# Patient Record
Sex: MALE | Race: ASIAN | NOT HISPANIC OR LATINO
[De-identification: names, ages, dates, MRNs, and addresses within clinical notes are randomized per-mention and may not be internally consistent; named-entity substitution may affect disease eponyms.]

---

## 2016-01-25 RX ORDER — SIMETHICONE 80 MG/1
1 TABLET, CHEWABLE ORAL
Qty: 0 | Refills: 0 | DISCHARGE
Start: 2016-01-25

## 2017-01-12 ENCOUNTER — APPOINTMENT (OUTPATIENT)
Dept: GASTROENTEROLOGY | Facility: HOSPITAL | Age: 57
End: 2017-01-12

## 2017-05-01 ENCOUNTER — OUTPATIENT (OUTPATIENT)
Dept: OUTPATIENT SERVICES | Facility: HOSPITAL | Age: 57
LOS: 1 days | End: 2017-05-01
Payer: MEDICAID

## 2017-05-04 DIAGNOSIS — R69 ILLNESS, UNSPECIFIED: ICD-10-CM

## 2017-07-01 PROCEDURE — G9001: CPT

## 2019-02-01 ENCOUNTER — OUTPATIENT (OUTPATIENT)
Dept: OUTPATIENT SERVICES | Facility: HOSPITAL | Age: 59
LOS: 1 days | End: 2019-02-01
Payer: MEDICAID

## 2019-02-01 PROCEDURE — G9001: CPT

## 2019-02-07 ENCOUNTER — EMERGENCY (EMERGENCY)
Facility: HOSPITAL | Age: 59
LOS: 1 days | Discharge: HOME | End: 2019-02-07
Attending: EMERGENCY MEDICINE | Admitting: EMERGENCY MEDICINE
Payer: MEDICAID

## 2019-02-07 VITALS
OXYGEN SATURATION: 96 % | SYSTOLIC BLOOD PRESSURE: 140 MMHG | TEMPERATURE: 98 F | RESPIRATION RATE: 18 BRPM | DIASTOLIC BLOOD PRESSURE: 86 MMHG | HEART RATE: 93 BPM

## 2019-02-07 VITALS
OXYGEN SATURATION: 96 % | RESPIRATION RATE: 17 BRPM | DIASTOLIC BLOOD PRESSURE: 85 MMHG | HEART RATE: 88 BPM | TEMPERATURE: 98 F | SYSTOLIC BLOOD PRESSURE: 125 MMHG

## 2019-02-07 LAB
ALBUMIN SERPL ELPH-MCNC: 4.7 G/DL — SIGNIFICANT CHANGE UP (ref 3.3–5)
ALP SERPL-CCNC: 68 U/L — SIGNIFICANT CHANGE UP (ref 40–120)
ALT FLD-CCNC: 41 U/L — SIGNIFICANT CHANGE UP (ref 4–41)
ANION GAP SERPL CALC-SCNC: 18 MMO/L — HIGH (ref 7–14)
APTT BLD: 34.9 SEC — SIGNIFICANT CHANGE UP (ref 27.5–36.3)
AST SERPL-CCNC: 59 U/L — HIGH (ref 4–40)
BASOPHILS # BLD AUTO: 0.12 K/UL — SIGNIFICANT CHANGE UP (ref 0–0.2)
BASOPHILS NFR BLD AUTO: 1.7 % — SIGNIFICANT CHANGE UP (ref 0–2)
BILIRUB SERPL-MCNC: 0.3 MG/DL — SIGNIFICANT CHANGE UP (ref 0.2–1.2)
BUN SERPL-MCNC: 16 MG/DL — SIGNIFICANT CHANGE UP (ref 7–23)
CALCIUM SERPL-MCNC: 9.1 MG/DL — SIGNIFICANT CHANGE UP (ref 8.4–10.5)
CHLORIDE SERPL-SCNC: 103 MMOL/L — SIGNIFICANT CHANGE UP (ref 98–107)
CO2 SERPL-SCNC: 23 MMOL/L — SIGNIFICANT CHANGE UP (ref 22–31)
CREAT SERPL-MCNC: 0.88 MG/DL — SIGNIFICANT CHANGE UP (ref 0.5–1.3)
EOSINOPHIL # BLD AUTO: 0.37 K/UL — SIGNIFICANT CHANGE UP (ref 0–0.5)
EOSINOPHIL NFR BLD AUTO: 5.3 % — SIGNIFICANT CHANGE UP (ref 0–6)
GLUCOSE SERPL-MCNC: 146 MG/DL — HIGH (ref 70–99)
HCT VFR BLD CALC: 44.6 % — SIGNIFICANT CHANGE UP (ref 39–50)
HGB BLD-MCNC: 14.7 G/DL — SIGNIFICANT CHANGE UP (ref 13–17)
IMM GRANULOCYTES NFR BLD AUTO: 0.3 % — SIGNIFICANT CHANGE UP (ref 0–1.5)
INR BLD: 1.07 — SIGNIFICANT CHANGE UP (ref 0.88–1.17)
LYMPHOCYTES # BLD AUTO: 3.16 K/UL — SIGNIFICANT CHANGE UP (ref 1–3.3)
LYMPHOCYTES # BLD AUTO: 45.7 % — HIGH (ref 13–44)
MCHC RBC-ENTMCNC: 29.2 PG — SIGNIFICANT CHANGE UP (ref 27–34)
MCHC RBC-ENTMCNC: 33 % — SIGNIFICANT CHANGE UP (ref 32–36)
MCV RBC AUTO: 88.5 FL — SIGNIFICANT CHANGE UP (ref 80–100)
MONOCYTES # BLD AUTO: 0.46 K/UL — SIGNIFICANT CHANGE UP (ref 0–0.9)
MONOCYTES NFR BLD AUTO: 6.6 % — SIGNIFICANT CHANGE UP (ref 2–14)
NEUTROPHILS # BLD AUTO: 2.79 K/UL — SIGNIFICANT CHANGE UP (ref 1.8–7.4)
NEUTROPHILS NFR BLD AUTO: 40.4 % — LOW (ref 43–77)
NRBC # FLD: 0 K/UL — LOW (ref 25–125)
PLATELET # BLD AUTO: 282 K/UL — SIGNIFICANT CHANGE UP (ref 150–400)
PMV BLD: 9.5 FL — SIGNIFICANT CHANGE UP (ref 7–13)
POTASSIUM SERPL-MCNC: 4.3 MMOL/L — SIGNIFICANT CHANGE UP (ref 3.5–5.3)
POTASSIUM SERPL-SCNC: 4.3 MMOL/L — SIGNIFICANT CHANGE UP (ref 3.5–5.3)
PROT SERPL-MCNC: 7.9 G/DL — SIGNIFICANT CHANGE UP (ref 6–8.3)
PROTHROM AB SERPL-ACNC: 12.2 SEC — SIGNIFICANT CHANGE UP (ref 9.8–13.1)
RBC # BLD: 5.04 M/UL — SIGNIFICANT CHANGE UP (ref 4.2–5.8)
RBC # FLD: 12.7 % — SIGNIFICANT CHANGE UP (ref 10.3–14.5)
SODIUM SERPL-SCNC: 144 MMOL/L — SIGNIFICANT CHANGE UP (ref 135–145)
WBC # BLD: 6.92 K/UL — SIGNIFICANT CHANGE UP (ref 3.8–10.5)
WBC # FLD AUTO: 6.92 K/UL — SIGNIFICANT CHANGE UP (ref 3.8–10.5)

## 2019-02-07 PROCEDURE — 70450 CT HEAD/BRAIN W/O DYE: CPT | Mod: 26

## 2019-02-07 PROCEDURE — 99283 EMERGENCY DEPT VISIT LOW MDM: CPT | Mod: 25

## 2019-02-07 PROCEDURE — 72125 CT NECK SPINE W/O DYE: CPT | Mod: 26

## 2019-02-07 RX ORDER — SODIUM CHLORIDE 9 MG/ML
2000 INJECTION INTRAMUSCULAR; INTRAVENOUS; SUBCUTANEOUS ONCE
Refills: 0 | Status: COMPLETED | OUTPATIENT
Start: 2019-02-07 | End: 2019-02-07

## 2019-02-07 RX ADMIN — SODIUM CHLORIDE 2000 MILLILITER(S): 9 INJECTION INTRAMUSCULAR; INTRAVENOUS; SUBCUTANEOUS at 08:58

## 2019-02-07 NOTE — ED PROVIDER NOTE - PHYSICAL EXAMINATION
***GEN - appears intoxicated, NAD   ***HEAD - 2cm superficial laceration to occiput with no active or pulsatile bleeding.    ***EYES/NOSE - PEERL, EOMI, mucous membranes moist, no discharge   ***THROAT: Oral cavity and pharynx normal. No inflammation, swelling, exudate, or lesions.    ***NECK: supple, non-tender no lymphadenopathy  ***PULMONARY - CTA b/l, symmetric breath sounds.   ***CARDIAC- s1s2, RRR, no murmur  ***ABDOMEN - +BS, ND, NT, soft, no guarding, no rebound, no organomegaly  ***BACK - no CVA tenderness, Normal  spine, no spinal TTP  ***EXTREMITIES - symmetric pulses, 2+ dp, capillary refill < 2 seconds, no clubbing, no cyanosis, no edema   ***SKIN - as above, otherwise warm, dry, intact, no rash or bruising   ***NEUROLOGIC - alert, oriented x2 CN 3-12 intact, sensation nl, motor 5/5 RUE/LUE/RLE/LLE

## 2019-02-07 NOTE — ED PROVIDER NOTE - NSFOLLOWUPINSTRUCTIONS_ED_ALL_ED_FT
1) You were here for alcohol intoxication and a fall.    2) Take tylenol for pain.    3) Follow up with your primary doctor for further evaluation and to answer any questions you have.    4) Return to the emergency department if you experience worsening symptoms, pain, fever, chills, nausea, vomiting or other concerning symptoms.

## 2019-02-07 NOTE — ED ADULT TRIAGE NOTE - CHIEF COMPLAINT QUOTE
BIBEMS s/p fall at home tripping over the lip between two rooms. Patient has had "a lot to drink," (+) AOB, with laceration to posterior skull. Patient A/Ox3, unsure of date but knows the day of the week. Head is wrapped, bleeding controlled, denies anticoagulation or LOC. Abdomen distended. Patient needs redirecting but able to follow commands. Denies pain, vision changes.

## 2019-02-07 NOTE — ED PROVIDER NOTE - MEDICAL DECISION MAKING DETAILS
58M with h/o alcohol abuse presents intoxicated with scalp wound after reported fall.  No other signs of trauma.  Wound is superficial, requires no primary closure.  Will clean and cover.  Will obtain hct and c-spine ct, place in c-collar for now.  Will obtain labs to r/o coagulopathy or other abnl. Will give ivf. 58M with h/o alcohol abuse presents intoxicated with scalp wound after reported fall.  No other signs of trauma.  Wound is superficial, requires no primary closure.  Will clean and cover.  Will obtain hct and c-spine ct, place in c-collar for now.  Will obtain labs to r/o coagulopathy or other abnl. Will give ivf.  Ephraim: Admits to drinking, slurred speech, bleeding from head wound. Concern for brain trauma, Full physical and assessment of glucose essential. will look at labs for bleeding risk.  CT head and neck

## 2019-02-07 NOTE — ED ADULT NURSE REASSESSMENT NOTE - NS ED NURSE REASSESS COMMENT FT1
Report received from Kevin TRINH. Pt is A&Ox3. Pt is visibly intoxicated, poor historian. States "I had a lot of vodka," unable to determine exact amount. Breath sounds even and unlabored, lung sounds clear, heart sounds normal, pulses strong and bounding. 20 G IV inserted in Left AC. Pt currently at CT. Report received from Kevin TRINH. Pt is A&Ox3. Pt is visibly intoxicated, poor historian. States "I had a lot of vodka," unable to determine exact amount. Respirations even and unlabored, lung sounds clear, heart sounds normal, 20 G IV inserted in Left AC. Pt currently at CT.

## 2019-02-07 NOTE — ED ADULT NURSE NOTE - NSIMPLEMENTINTERV_GEN_ALL_ED
Implemented All Fall with Harm Risk Interventions:  [place] to call system. Call bell, personal items and telephone within reach. Instruct patient to call for assistance. Room bathroom lighting operational. Non-slip footwear when patient is off stretcher. Physically safe environment: no spills, clutter or unnecessary equipment. Stretcher in lowest position, wheels locked, appropriate side rails in place. Provide visual cue, wrist band, yellow gown, etc. Monitor gait and stability. Monitor for mental status changes and reorient to person, place, and time. Review medications for side effects contributing to fall risk. Reinforce activity limits and safety measures with patient and family. Provide visual clues: red socks.

## 2019-02-07 NOTE — ED PROVIDER NOTE - OBJECTIVE STATEMENT
57 yo M PMH alcohol abuse, Hypertension presents with intoxication and superficial laceration to occiput.  Patient states he fell but is unable to provide other details.  Denies other complaints of pain.  States he has been drinking alcohol but unable to detail amount. 57 yo M PMH alcohol abuse, Hypertension presents with intoxication and superficial laceration to occiput.  Patient states he fell but is unable to provide other details.  Denies other complaints of pain.  States he has been drinking alcohol but unable to detail amount.  Last tetanus update was 2 yrs ago.

## 2019-02-07 NOTE — ED PROVIDER NOTE - ATTENDING CONTRIBUTION TO CARE
I performed a history and physical exam of the patient and discussed their management with the resident and /or advanced care provider. I reviewed the resident and /or ACP's note and agree with the documented findings and plan of care. My medical decison making and observations are found above.  Lungs, head laceration 2cm,

## 2019-02-08 DIAGNOSIS — Z71.89 OTHER SPECIFIED COUNSELING: ICD-10-CM

## 2019-03-23 ENCOUNTER — EMERGENCY (EMERGENCY)
Facility: HOSPITAL | Age: 59
LOS: 1 days | Discharge: HOME | End: 2019-03-23
Attending: EMERGENCY MEDICINE | Admitting: EMERGENCY MEDICINE
Payer: MEDICAID

## 2019-03-23 VITALS
DIASTOLIC BLOOD PRESSURE: 96 MMHG | HEART RATE: 100 BPM | OXYGEN SATURATION: 100 % | RESPIRATION RATE: 16 BRPM | TEMPERATURE: 98 F | SYSTOLIC BLOOD PRESSURE: 142 MMHG

## 2019-03-23 VITALS
HEART RATE: 88 BPM | OXYGEN SATURATION: 98 % | RESPIRATION RATE: 18 BRPM | DIASTOLIC BLOOD PRESSURE: 91 MMHG | TEMPERATURE: 98 F | SYSTOLIC BLOOD PRESSURE: 129 MMHG

## 2019-03-23 PROCEDURE — 99282 EMERGENCY DEPT VISIT SF MDM: CPT
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
## 2019-03-23 NOTE — ED PROVIDER NOTE - PROGRESS NOTE DETAILS
Pt given outpatient detox and etoh abuse and misuse information and resources. Pt requesting d/c at this time, significant other here to pick him up. No SI or HI, no apparent threat to self or others, ready for d/c with strict return precautions. Able to ambulate without assistance, tolerating PO. Pt instructed to never drive while under influence of alcohol. Has ride home.

## 2019-03-23 NOTE — ED PROVIDER NOTE - CONSTITUTIONAL, MLM
normal... Appears intoxicated clinically with smell of etoh on breath; well nourished, awake, alert, oriented to person, place, time/situation and in no apparent distress.

## 2019-03-23 NOTE — ED PROVIDER NOTE - NSFOLLOWUPINSTRUCTIONS_ED_ALL_ED_FT
Stay hydrated by drinking plenty of water. You were given resources while in the ER to help you quit alcohol. Never drive while under the influence of alcohol. Follow up with your primary doctor within 24 hours. Return to the ER promptly for any urgent concerns.

## 2019-03-23 NOTE — ED PROVIDER NOTE - ATTENDING CONTRIBUTION TO CARE
Mj: I have seen and examined the patient face to face, have reviewed and addended the HPI, PE and a/p as necessary.     57 yo M a/w ETOH intoxication.  Patient was BIBA reports drinking daily.  Denies any head trauma, nausea, vomtiing, dizziness, chest pain, shortness of breath, abdominal pain.   Denies any active or previous suicidal ideation or attempt, denies hallucinations or HI.   Reports feeling depressed over the past few months. Denies recent falls, head trauma.     GEN - NAD; well appearing; A+O x3; non-toxic appearing CARD -s1s2, RRR, no M,G,R; PULM - CTA b/l, symmetric breath sounds; ABD:  +BS, ND, NT, soft, no guarding, no rebound, no masses; BACK: no CVA tenderness, Normal  spine; EXT: symmetric pulses, 2+ dp, capillary refill < 2 seconds, no clubbing, no cyanosis, no edema NEURO: alert, CN 2-12 intact, reflexes nl, sensation nl, coordination nl, finger to nose nl, romberg negative, motor 5/5 RUE/LUE/RLE/LLE/EHL/Plantar flexion, no pronator drift, gait nl     57 yo M a/w etoh intxo, SBIRT eval, will provide crisis center follow up for depressive symptoms.  No signs of trauma on history or physical exam.  Awaiting patient to become sober.

## 2019-03-23 NOTE — ED PROVIDER NOTE - CLINICAL SUMMARY MEDICAL DECISION MAKING FREE TEXT BOX
Pt with PMH of etoh abuse presents for intoxication, apppears clinically intoxicated in the ED. A&O x3, normal neuro exam, no signs of trauma. Plan: observation, PO challenge, will d/c when clinically sober or allow family to .

## 2019-03-23 NOTE — ED PROVIDER NOTE - PSYCHIATRIC, MLM
Appears clinically intoxicated but alert and oriented to person, place, time/situation. normal mood and affect. no apparent risk to self or others. Cooperative with exam, no agitation.

## 2019-03-23 NOTE — ED ADULT NURSE NOTE - NSIMPLEMENTINTERV_GEN_ALL_ED
Implemented All Fall Risk Interventions:  [place] to call system. Call bell, personal items and telephone within reach. Instruct patient to call for assistance. Room bathroom lighting operational. Non-slip footwear when patient is off stretcher. Physically safe environment: no spills, clutter or unnecessary equipment. Stretcher in lowest position, wheels locked, appropriate side rails in place. Provide visual cue, wrist band, yellow gown, etc. Monitor gait and stability. Monitor for mental status changes and reorient to person, place, and time. Review medications for side effects contributing to fall risk. Reinforce activity limits and safety measures with patient and family.

## 2019-09-01 ENCOUNTER — OUTPATIENT (OUTPATIENT)
Dept: OUTPATIENT SERVICES | Facility: HOSPITAL | Age: 59
LOS: 1 days | End: 2019-09-01
Payer: MEDICAID

## 2019-09-01 PROCEDURE — G9001: CPT

## 2019-09-06 ENCOUNTER — INPATIENT (INPATIENT)
Facility: HOSPITAL | Age: 59
LOS: 2 days | Discharge: HOME | End: 2019-09-09
Attending: HOSPITALIST | Admitting: HOSPITALIST
Payer: MEDICAID

## 2019-09-06 VITALS
DIASTOLIC BLOOD PRESSURE: 84 MMHG | SYSTOLIC BLOOD PRESSURE: 148 MMHG | OXYGEN SATURATION: 100 % | RESPIRATION RATE: 17 BRPM | HEART RATE: 93 BPM | TEMPERATURE: 98 F

## 2019-09-06 DIAGNOSIS — I10 ESSENTIAL (PRIMARY) HYPERTENSION: ICD-10-CM

## 2019-09-06 DIAGNOSIS — J45.909 UNSPECIFIED ASTHMA, UNCOMPLICATED: ICD-10-CM

## 2019-09-06 DIAGNOSIS — R74.0 NONSPECIFIC ELEVATION OF LEVELS OF TRANSAMINASE AND LACTIC ACID DEHYDROGENASE [LDH]: ICD-10-CM

## 2019-09-06 DIAGNOSIS — F10.10 ALCOHOL ABUSE, UNCOMPLICATED: ICD-10-CM

## 2019-09-06 DIAGNOSIS — K70.10 ALCOHOLIC HEPATITIS WITHOUT ASCITES: ICD-10-CM

## 2019-09-06 DIAGNOSIS — Z29.9 ENCOUNTER FOR PROPHYLACTIC MEASURES, UNSPECIFIED: ICD-10-CM

## 2019-09-06 LAB
ALBUMIN SERPL ELPH-MCNC: 3.9 G/DL — SIGNIFICANT CHANGE UP (ref 3.3–5)
ALBUMIN SERPL ELPH-MCNC: 4.2 G/DL — SIGNIFICANT CHANGE UP (ref 3.3–5)
ALBUMIN SERPL ELPH-MCNC: 4.7 G/DL — SIGNIFICANT CHANGE UP (ref 3.3–5)
ALP SERPL-CCNC: 63 U/L — SIGNIFICANT CHANGE UP (ref 40–120)
ALP SERPL-CCNC: 65 U/L — SIGNIFICANT CHANGE UP (ref 40–120)
ALP SERPL-CCNC: 69 U/L — SIGNIFICANT CHANGE UP (ref 40–120)
ALT FLD-CCNC: 1058 U/L — HIGH (ref 4–41)
ALT FLD-CCNC: 715 U/L — HIGH (ref 4–41)
ALT FLD-CCNC: SIGNIFICANT CHANGE UP U/L (ref 4–41)
AMMONIA BLD-MCNC: 45 UMOL/L — SIGNIFICANT CHANGE UP (ref 11–55)
ANION GAP SERPL CALC-SCNC: 12 MMO/L — SIGNIFICANT CHANGE UP (ref 7–14)
ANION GAP SERPL CALC-SCNC: 15 MMO/L — HIGH (ref 7–14)
ANION GAP SERPL CALC-SCNC: 17 MMO/L — HIGH (ref 7–14)
APAP SERPL-MCNC: < 15 UG/ML — LOW (ref 15–25)
APTT BLD: 30.3 SEC — SIGNIFICANT CHANGE UP (ref 27.5–36.3)
APTT BLD: 30.8 SEC — SIGNIFICANT CHANGE UP (ref 27.5–36.3)
AST SERPL-CCNC: 1042 U/L — HIGH (ref 4–40)
AST SERPL-CCNC: 2400 U/L — HIGH (ref 4–40)
AST SERPL-CCNC: SIGNIFICANT CHANGE UP U/L (ref 4–40)
BASE EXCESS BLDV CALC-SCNC: 3.4 MMOL/L — SIGNIFICANT CHANGE UP
BASOPHILS # BLD AUTO: 0.1 K/UL — SIGNIFICANT CHANGE UP (ref 0–0.2)
BASOPHILS NFR BLD AUTO: 1.6 % — SIGNIFICANT CHANGE UP (ref 0–2)
BILIRUB SERPL-MCNC: 1 MG/DL — SIGNIFICANT CHANGE UP (ref 0.2–1.2)
BILIRUB SERPL-MCNC: 1.2 MG/DL — SIGNIFICANT CHANGE UP (ref 0.2–1.2)
BILIRUB SERPL-MCNC: 1.7 MG/DL — HIGH (ref 0.2–1.2)
BLOOD GAS VENOUS - CREATININE: 0.98 MG/DL — SIGNIFICANT CHANGE UP (ref 0.5–1.3)
BLOOD GAS VENOUS - FIO2: 21 — SIGNIFICANT CHANGE UP
BUN SERPL-MCNC: 10 MG/DL — SIGNIFICANT CHANGE UP (ref 7–23)
BUN SERPL-MCNC: 15 MG/DL — SIGNIFICANT CHANGE UP (ref 7–23)
BUN SERPL-MCNC: 9 MG/DL — SIGNIFICANT CHANGE UP (ref 7–23)
CALCIUM SERPL-MCNC: 8.5 MG/DL — SIGNIFICANT CHANGE UP (ref 8.4–10.5)
CALCIUM SERPL-MCNC: 8.7 MG/DL — SIGNIFICANT CHANGE UP (ref 8.4–10.5)
CALCIUM SERPL-MCNC: 9.2 MG/DL — SIGNIFICANT CHANGE UP (ref 8.4–10.5)
CHLORIDE BLDV-SCNC: 104 MMOL/L — SIGNIFICANT CHANGE UP (ref 96–108)
CHLORIDE SERPL-SCNC: 103 MMOL/L — SIGNIFICANT CHANGE UP (ref 98–107)
CHLORIDE SERPL-SCNC: 105 MMOL/L — SIGNIFICANT CHANGE UP (ref 98–107)
CHLORIDE SERPL-SCNC: 97 MMOL/L — LOW (ref 98–107)
CHOLEST SERPL-MCNC: 138 MG/DL — SIGNIFICANT CHANGE UP (ref 120–199)
CK SERPL-CCNC: 438 U/L — HIGH (ref 30–200)
CO2 SERPL-SCNC: 21 MMOL/L — LOW (ref 22–31)
CO2 SERPL-SCNC: 21 MMOL/L — LOW (ref 22–31)
CO2 SERPL-SCNC: 24 MMOL/L — SIGNIFICANT CHANGE UP (ref 22–31)
CREAT SERPL-MCNC: 0.84 MG/DL — SIGNIFICANT CHANGE UP (ref 0.5–1.3)
CREAT SERPL-MCNC: 0.92 MG/DL — SIGNIFICANT CHANGE UP (ref 0.5–1.3)
CREAT SERPL-MCNC: 0.93 MG/DL — SIGNIFICANT CHANGE UP (ref 0.5–1.3)
EOSINOPHIL # BLD AUTO: 0.33 K/UL — SIGNIFICANT CHANGE UP (ref 0–0.5)
EOSINOPHIL NFR BLD AUTO: 5.4 % — SIGNIFICANT CHANGE UP (ref 0–6)
ETHANOL BLD-MCNC: < 10 MG/DL — SIGNIFICANT CHANGE UP
GAS PNL BLDV: 136 MMOL/L — SIGNIFICANT CHANGE UP (ref 136–146)
GLUCOSE BLDV-MCNC: 118 MG/DL — HIGH (ref 70–99)
GLUCOSE SERPL-MCNC: 126 MG/DL — HIGH (ref 70–99)
GLUCOSE SERPL-MCNC: 134 MG/DL — HIGH (ref 70–99)
GLUCOSE SERPL-MCNC: 155 MG/DL — HIGH (ref 70–99)
HAV IGM SER-ACNC: NONREACTIVE — SIGNIFICANT CHANGE UP
HBA1C BLD-MCNC: 6.1 % — HIGH (ref 4–5.6)
HBV CORE IGM SER-ACNC: NONREACTIVE — SIGNIFICANT CHANGE UP
HBV SURFACE AG SER-ACNC: NONREACTIVE — SIGNIFICANT CHANGE UP
HCO3 BLDV-SCNC: 25 MMOL/L — SIGNIFICANT CHANGE UP (ref 20–27)
HCT VFR BLD CALC: 45.8 % — SIGNIFICANT CHANGE UP (ref 39–50)
HCT VFR BLDV CALC: 49.2 % — SIGNIFICANT CHANGE UP (ref 39–51)
HCV AB S/CO SERPL IA: 0.1 S/CO — SIGNIFICANT CHANGE UP (ref 0–0.99)
HCV AB SERPL-IMP: SIGNIFICANT CHANGE UP
HDLC SERPL-MCNC: 29 MG/DL — LOW (ref 35–55)
HGB BLD-MCNC: 15.6 G/DL — SIGNIFICANT CHANGE UP (ref 13–17)
HGB BLDV-MCNC: 16.1 G/DL — SIGNIFICANT CHANGE UP (ref 13–17)
IMM GRANULOCYTES NFR BLD AUTO: 0.2 % — SIGNIFICANT CHANGE UP (ref 0–1.5)
INR BLD: 1.12 — SIGNIFICANT CHANGE UP (ref 0.88–1.17)
INR BLD: 1.18 — HIGH (ref 0.88–1.17)
LACTATE BLDV-MCNC: 2.6 MMOL/L — HIGH (ref 0.5–2)
LIDOCAIN IGE QN: 15.6 U/L — SIGNIFICANT CHANGE UP (ref 7–60)
LIPID PNL WITH DIRECT LDL SERPL: 15 MG/DL — SIGNIFICANT CHANGE UP
LYMPHOCYTES # BLD AUTO: 1.52 K/UL — SIGNIFICANT CHANGE UP (ref 1–3.3)
LYMPHOCYTES # BLD AUTO: 25 % — SIGNIFICANT CHANGE UP (ref 13–44)
MAGNESIUM SERPL-MCNC: 1.6 MG/DL — SIGNIFICANT CHANGE UP (ref 1.6–2.6)
MCHC RBC-ENTMCNC: 30.3 PG — SIGNIFICANT CHANGE UP (ref 27–34)
MCHC RBC-ENTMCNC: 34.1 % — SIGNIFICANT CHANGE UP (ref 32–36)
MCV RBC AUTO: 88.9 FL — SIGNIFICANT CHANGE UP (ref 80–100)
MONOCYTES # BLD AUTO: 0.38 K/UL — SIGNIFICANT CHANGE UP (ref 0–0.9)
MONOCYTES NFR BLD AUTO: 6.3 % — SIGNIFICANT CHANGE UP (ref 2–14)
NEUTROPHILS # BLD AUTO: 3.74 K/UL — SIGNIFICANT CHANGE UP (ref 1.8–7.4)
NEUTROPHILS NFR BLD AUTO: 61.5 % — SIGNIFICANT CHANGE UP (ref 43–77)
NRBC # FLD: 0 K/UL — SIGNIFICANT CHANGE UP (ref 0–0)
PCO2 BLDV: 43 MMHG — SIGNIFICANT CHANGE UP (ref 41–51)
PH BLDV: 7.42 PH — SIGNIFICANT CHANGE UP (ref 7.32–7.43)
PHOSPHATE SERPL-MCNC: 2 MG/DL — LOW (ref 2.5–4.5)
PLATELET # BLD AUTO: 199 K/UL — SIGNIFICANT CHANGE UP (ref 150–400)
PMV BLD: 9.5 FL — SIGNIFICANT CHANGE UP (ref 7–13)
PO2 BLDV: 24 MMHG — LOW (ref 35–40)
POTASSIUM BLDV-SCNC: 4.4 MMOL/L — SIGNIFICANT CHANGE UP (ref 3.4–4.5)
POTASSIUM SERPL-MCNC: 3.6 MMOL/L — SIGNIFICANT CHANGE UP (ref 3.5–5.3)
POTASSIUM SERPL-MCNC: 3.8 MMOL/L — SIGNIFICANT CHANGE UP (ref 3.5–5.3)
POTASSIUM SERPL-MCNC: 4.5 MMOL/L — SIGNIFICANT CHANGE UP (ref 3.5–5.3)
POTASSIUM SERPL-SCNC: 3.6 MMOL/L — SIGNIFICANT CHANGE UP (ref 3.5–5.3)
POTASSIUM SERPL-SCNC: 3.8 MMOL/L — SIGNIFICANT CHANGE UP (ref 3.5–5.3)
POTASSIUM SERPL-SCNC: 4.5 MMOL/L — SIGNIFICANT CHANGE UP (ref 3.5–5.3)
PROT SERPL-MCNC: 6.8 G/DL — SIGNIFICANT CHANGE UP (ref 6–8.3)
PROT SERPL-MCNC: 7.2 G/DL — SIGNIFICANT CHANGE UP (ref 6–8.3)
PROT SERPL-MCNC: 7.9 G/DL — SIGNIFICANT CHANGE UP (ref 6–8.3)
PROTHROM AB SERPL-ACNC: 12.5 SEC — SIGNIFICANT CHANGE UP (ref 9.8–13.1)
PROTHROM AB SERPL-ACNC: 13.5 SEC — HIGH (ref 9.8–13.1)
RBC # BLD: 5.15 M/UL — SIGNIFICANT CHANGE UP (ref 4.2–5.8)
RBC # FLD: 13 % — SIGNIFICANT CHANGE UP (ref 10.3–14.5)
SALICYLATES SERPL-MCNC: < 5 MG/DL — LOW (ref 15–30)
SAO2 % BLDV: 33.2 % — LOW (ref 60–85)
SODIUM SERPL-SCNC: 138 MMOL/L — SIGNIFICANT CHANGE UP (ref 135–145)
SODIUM SERPL-SCNC: 138 MMOL/L — SIGNIFICANT CHANGE UP (ref 135–145)
SODIUM SERPL-SCNC: 139 MMOL/L — SIGNIFICANT CHANGE UP (ref 135–145)
TRIGL SERPL-MCNC: 638 MG/DL — HIGH (ref 10–149)
TSH SERPL-MCNC: 4.33 UIU/ML — HIGH (ref 0.27–4.2)
WBC # BLD: 6.08 K/UL — SIGNIFICANT CHANGE UP (ref 3.8–10.5)
WBC # FLD AUTO: 6.08 K/UL — SIGNIFICANT CHANGE UP (ref 3.8–10.5)

## 2019-09-06 PROCEDURE — 76705 ECHO EXAM OF ABDOMEN: CPT | Mod: 26

## 2019-09-06 PROCEDURE — 99222 1ST HOSP IP/OBS MODERATE 55: CPT | Mod: GC

## 2019-09-06 PROCEDURE — 99223 1ST HOSP IP/OBS HIGH 75: CPT | Mod: GC

## 2019-09-06 RX ORDER — THIAMINE MONONITRATE (VIT B1) 100 MG
500 TABLET ORAL EVERY 8 HOURS
Refills: 0 | Status: DISCONTINUED | OUTPATIENT
Start: 2019-09-06 | End: 2019-09-06

## 2019-09-06 RX ORDER — SODIUM CHLORIDE 9 MG/ML
1000 INJECTION INTRAMUSCULAR; INTRAVENOUS; SUBCUTANEOUS ONCE
Refills: 0 | Status: COMPLETED | OUTPATIENT
Start: 2019-09-06 | End: 2019-09-06

## 2019-09-06 RX ORDER — ONDANSETRON 8 MG/1
4 TABLET, FILM COATED ORAL ONCE
Refills: 0 | Status: COMPLETED | OUTPATIENT
Start: 2019-09-06 | End: 2019-09-06

## 2019-09-06 RX ORDER — THIAMINE MONONITRATE (VIT B1) 100 MG
500 TABLET ORAL EVERY 8 HOURS
Refills: 0 | Status: COMPLETED | OUTPATIENT
Start: 2019-09-06 | End: 2019-09-09

## 2019-09-06 RX ORDER — INFLUENZA VIRUS VACCINE 15; 15; 15; 15 UG/.5ML; UG/.5ML; UG/.5ML; UG/.5ML
0.5 SUSPENSION INTRAMUSCULAR ONCE
Refills: 0 | Status: DISCONTINUED | OUTPATIENT
Start: 2019-09-06 | End: 2019-09-09

## 2019-09-06 RX ORDER — IPRATROPIUM/ALBUTEROL SULFATE 18-103MCG
3 AEROSOL WITH ADAPTER (GRAM) INHALATION ONCE
Refills: 0 | Status: COMPLETED | OUTPATIENT
Start: 2019-09-06 | End: 2019-09-06

## 2019-09-06 RX ORDER — FAMOTIDINE 10 MG/ML
20 INJECTION INTRAVENOUS ONCE
Refills: 0 | Status: COMPLETED | OUTPATIENT
Start: 2019-09-06 | End: 2019-09-06

## 2019-09-06 RX ORDER — ONDANSETRON 8 MG/1
4 TABLET, FILM COATED ORAL ONCE
Refills: 0 | Status: DISCONTINUED | OUTPATIENT
Start: 2019-09-06 | End: 2019-09-06

## 2019-09-06 RX ORDER — SODIUM CHLORIDE 9 MG/ML
1000 INJECTION, SOLUTION INTRAVENOUS
Refills: 0 | Status: DISCONTINUED | OUTPATIENT
Start: 2019-09-06 | End: 2019-09-09

## 2019-09-06 RX ORDER — SODIUM,POTASSIUM PHOSPHATES 278-250MG
1 POWDER IN PACKET (EA) ORAL
Refills: 0 | Status: COMPLETED | OUTPATIENT
Start: 2019-09-06 | End: 2019-09-07

## 2019-09-06 RX ORDER — PANTOPRAZOLE SODIUM 20 MG/1
40 TABLET, DELAYED RELEASE ORAL
Refills: 0 | Status: DISCONTINUED | OUTPATIENT
Start: 2019-09-06 | End: 2019-09-09

## 2019-09-06 RX ORDER — IPRATROPIUM/ALBUTEROL SULFATE 18-103MCG
3 AEROSOL WITH ADAPTER (GRAM) INHALATION EVERY 6 HOURS
Refills: 0 | Status: DISCONTINUED | OUTPATIENT
Start: 2019-09-06 | End: 2019-09-07

## 2019-09-06 RX ORDER — LANOLIN ALCOHOL/MO/W.PET/CERES
3 CREAM (GRAM) TOPICAL AT BEDTIME
Refills: 0 | Status: DISCONTINUED | OUTPATIENT
Start: 2019-09-06 | End: 2019-09-09

## 2019-09-06 RX ORDER — ONDANSETRON 8 MG/1
4 TABLET, FILM COATED ORAL EVERY 6 HOURS
Refills: 0 | Status: DISCONTINUED | OUTPATIENT
Start: 2019-09-06 | End: 2019-09-09

## 2019-09-06 RX ORDER — BUDESONIDE AND FORMOTEROL FUMARATE DIHYDRATE 160; 4.5 UG/1; UG/1
2 AEROSOL RESPIRATORY (INHALATION)
Refills: 0 | Status: DISCONTINUED | OUTPATIENT
Start: 2019-09-06 | End: 2019-09-09

## 2019-09-06 RX ORDER — LISINOPRIL 2.5 MG/1
5 TABLET ORAL DAILY
Refills: 0 | Status: DISCONTINUED | OUTPATIENT
Start: 2019-09-06 | End: 2019-09-07

## 2019-09-06 RX ORDER — HYDRALAZINE HCL 50 MG
5 TABLET ORAL ONCE
Refills: 0 | Status: COMPLETED | OUTPATIENT
Start: 2019-09-06 | End: 2019-09-06

## 2019-09-06 RX ADMIN — ONDANSETRON 4 MILLIGRAM(S): 8 TABLET, FILM COATED ORAL at 05:24

## 2019-09-06 RX ADMIN — Medication 105 MILLIGRAM(S): at 22:52

## 2019-09-06 RX ADMIN — Medication 1 TABLET(S): at 22:05

## 2019-09-06 RX ADMIN — BUDESONIDE AND FORMOTEROL FUMARATE DIHYDRATE 2 PUFF(S): 160; 4.5 AEROSOL RESPIRATORY (INHALATION) at 21:58

## 2019-09-06 RX ADMIN — SODIUM CHLORIDE 100 MILLILITER(S): 9 INJECTION, SOLUTION INTRAVENOUS at 16:09

## 2019-09-06 RX ADMIN — Medication 3 MILLILITER(S): at 07:42

## 2019-09-06 RX ADMIN — Medication 3 MILLILITER(S): at 19:32

## 2019-09-06 RX ADMIN — SODIUM CHLORIDE 1000 MILLILITER(S): 9 INJECTION INTRAMUSCULAR; INTRAVENOUS; SUBCUTANEOUS at 10:47

## 2019-09-06 RX ADMIN — Medication 3 MILLILITER(S): at 14:14

## 2019-09-06 RX ADMIN — LISINOPRIL 5 MILLIGRAM(S): 2.5 TABLET ORAL at 16:05

## 2019-09-06 RX ADMIN — Medication 5 MILLIGRAM(S): at 13:21

## 2019-09-06 RX ADMIN — Medication 105 MILLIGRAM(S): at 14:17

## 2019-09-06 RX ADMIN — Medication 1 TABLET(S): at 17:37

## 2019-09-06 RX ADMIN — Medication 30 MILLILITER(S): at 07:07

## 2019-09-06 RX ADMIN — FAMOTIDINE 20 MILLIGRAM(S): 10 INJECTION INTRAVENOUS at 07:07

## 2019-09-06 RX ADMIN — SODIUM CHLORIDE 1000 MILLILITER(S): 9 INJECTION INTRAMUSCULAR; INTRAVENOUS; SUBCUTANEOUS at 05:24

## 2019-09-06 RX ADMIN — Medication 3 MILLIGRAM(S): at 22:04

## 2019-09-06 RX ADMIN — Medication 1 TABLET(S): at 13:21

## 2019-09-06 NOTE — H&P ADULT - PROBLEM SELECTOR PLAN 2
- /104 in the ED  - given hydralazine 5mg x1  - will c/w lisinopril 5mg (home dose) given no JAVIER - thiamine 500mg TID x 5 days  - symptom triggered CIWA  -  consult for alcohol cessation c/b ETOH gastritis/duodenitis: c/w protonix 40mg qd  - thiamine 500mg TID x 5 days, currently not withdrawing  - symptom triggered CIWA  - Stox negative, Utox pending  -  consult for alcohol cessation

## 2019-09-06 NOTE — ED ADULT NURSE NOTE - OBJECTIVE STATEMENT
Pt is a 58 year old male reporting to the Ed for abdominal pain and vomiting. Pt reports nausea and vomiting started 3 days ago. PT reports decreased po intake. PT reports transverse abdominal pain increasing when vomiting. Pt denies fever, chills, sick contact. PT is AOX4. Pt denies chest pain or SOB. PT rr even an unlabored, spo2 98 % on room air. Pt denies dysuria, hematuria. Pt is afebrile on assessment. PT reports last BM 2 days ago, as normal. PT abdomen is nondistended, painful to palpation. 18 g iv placed in right ac, labs drawn, pending review, will continue to monitor.

## 2019-09-06 NOTE — ED PROVIDER NOTE - CLINICAL SUMMARY MEDICAL DECISION MAKING FREE TEXT BOX
DH: Patient is a 58 year old male PMH HTN, asthma, HLD, GERD, presents with 3 days N/V/C. Unable to keep down food/water. Admits to chills, denies fever. No inciting events or recent antibiotic use. Labs, fluids, PO challenge, reassess.

## 2019-09-06 NOTE — ED ADULT TRIAGE NOTE - CHIEF COMPLAINT QUOTE
Ambulatory complaining of vomiting x1 day, unable to tolerate PO, normal BM's. States that he has transverse abdominal pain from the vomiting, worse when he is retching. Patient states he believes it is hunger pains, last meal Wednesday night. PMH HTN, borderline diabetes, asthma. Denies travel, sick contacts.

## 2019-09-06 NOTE — H&P ADULT - ASSESSMENT
57yo M with hx of HTN, ETOH abuse, gastritis, and GERD who presented with 3 days of nausea, vomiting, and abdominal pain found to have transaminitis possibly 2/2 acute hepatitis vs. ethanol vs. drug induced liver injury.

## 2019-09-06 NOTE — ED PROVIDER NOTE - OBJECTIVE STATEMENT
Patient is a 58 year old male PMH HTN, asthma, HLD, GERD, presents to the ED with nausea and vomiting. Patient states 3 nights ago he began with unprovoked nausea and vomiting. He has since been unable to keep down any food or liquids. Associated with a crampy pain in his upper abdomen that is intermittent. Last BM 3 days ago. Denies fever but endorses chills and lethargy. Denies sick contacts, recent travel, or recent antibiotic use. States he has been under a lot of stress lately due to the passing of a close friend 2 weeks ago. No SI/HI. Denies headache, visual changes, chest pain, shortness of breath, diarrhea, or dysuria.

## 2019-09-06 NOTE — ED PROVIDER NOTE - PHYSICAL EXAMINATION
GENERAL: A&Ox3, non-toxic appearing, no acute distress  HEENT: NCAT, EOMI, oral mucosa moist, normal conjunctiva without icterus  RESP: CTAB, no respiratory distress, no wheezes/rhonchi/rales, speaking in full sentences  CV: RRR, no murmurs/rubs/gallops  ABDOMEN: soft, non-tender, appears distended but patient states its normal, + hyperactive bowel sounds x4, no guarding  MSK: no visible deformities  NEURO: no focal sensory or motor deficits, normal CN exam   SKIN: warm, normal color, well perfused, no rash  PSYCH: normal affect    -Rg Thurston, PGY-1

## 2019-09-06 NOTE — ED PROVIDER NOTE - NS ED ROS FT
GENERAL: no fever, chills, no weight loss  EYES: no change in vision  HEENT: no trouble swallowing or speaking, no throat pain  CARDIAC: no chest pain, no palpitations   PULMONARY: no cough, no shortness of breath, no wheezing  GI: crampy abdominal pain, nausea, vomiting, no diarrhea, constipation, no melena, no hematochezia, no hematemesis  : no changes in urination, no dysuria, no frequency, no hematuria, no discharge  SKIN: no rashes  NEURO: no headache, no numbness, fatigue  MSK: no joint pain, no muscle pain, no back pain    -Rg Thurston, PGY-1

## 2019-09-06 NOTE — H&P ADULT - NSHPLABSRESULTS_GEN_ALL_CORE
15.6   6.08  )-----------( 199      ( 06 Sep 2019 05:01 )             45.8       09-06    138  |  97<L>  |  15  ----------------------------<  126<H>  4.5   |  24  |  0.92    Ca    9.2      06 Sep 2019 05:01    TPro  7.9  /  Alb  4.7  /  TBili  1.7<H>  /  DBili  x   /  AST  2400<H>  /  ALT  1058<H>  /  AlkPhos  65  09-06            PT/INR - ( 06 Sep 2019 10:10 )   PT: 13.5 SEC;   INR: 1.18          PTT - ( 06 Sep 2019 10:10 )  PTT:30.3 SEC    CAPILLARY BLOOD GLUCOSE    Blood Gas Venous Comprehensive (09.06.19 @ 05:01)    Blood Gas Venous - Lactate: 2.6: Please note updated reference range. mmol/L    Blood Gas Venous - Chloride: 104 mmol/L    Blood Gas Venous - Creatinine: 0.98 mg/dL    pH, Venous: 7.42 pH    pCO2, Venous: 43 mmHg    pO2, Venous: 24 mmHg    HCO3, Venous: 25 mmol/L    Blood Gas Venous - FIO2: 21    Base Excess, Venous: 3.4: REFERENCE RANGE = -3 + 2 mmol/L mmol/L    Oxygen Saturation, Venous: 33.2 %    Blood Gas Venous - Sodium: 136 mmol/L    Blood Gas Venous - Potassium: 4.4 mmol/L    Blood Gas Venous - Glucose: 118 mg/dL    Blood Gas Venous - Hemoglobin: 16.1: Delta: 10.9 on 09/02/  Delta: 10.9 on 09/02/ g/dL    Blood Gas Venous - Hematocrit: 49.2: Delta: 33.7 on 09/02/  Delta: 33.7 on 09/02/ %      < from: US Abdomen Limited (09.06.19 @ 07:11) >    FINDINGS:    Liver: The liver appears echogenic, consistent with fatty infiltration.   Previous 1 cm hypervascular lesion seen on CT 9/2/2016 is not visualized.    Bile ducts: Normal caliber. Common bile duct measures 2 mm.     Gallbladder: Within normal limits.        Pancreas: Limited examination due to overlying bowel gas    Right kidney: 10.7 cm. No hydronephrosis.    Ascites: None.    IVC: Visualized portions are within normal limits.    IMPRESSION:     Hepatic steatosis.        < from: 12 Lead ECG (09.06.19 @ 03:47) >    Ventricular Rate 88 BPM    Atrial Rate 88 BPM    P-R Interval 150 ms    QRS Duration 92 ms    Q-T Interval 374 ms    QTC Calculation(Bezet) 452 ms    P Axis 56 degrees    R Axis 36 degrees    T Axis 39 degrees    Diagnosis Line Normal sinus rhythm  Normal ECG 15.6   6.08  )-----------( 199      ( 06 Sep 2019 05:01 )             45.8       09-06    138  |  97<L>  |  15  ----------------------------<  126<H>  4.5   |  24  |  0.92    Ca    9.2      06 Sep 2019 05:01    TPro  7.9  /  Alb  4.7  /  TBili  1.7<H>  /  DBili  x   /  AST  2400<H>  /  ALT  1058<H>  /  AlkPhos  65  09-06            PT/INR - ( 06 Sep 2019 10:10 )   PT: 13.5 SEC;   INR: 1.18          PTT - ( 06 Sep 2019 10:10 )  PTT:30.3 SEC    Toxicology Screen, Drugs of Abuse, Serum (09.06.19 @ 10:30)    Acetaminophen Level, Serum: < 15.0:     Salicylate Level, Serum: < 5.0:     Ethanol, Whole Blood: < 10:       Blood Gas Venous Comprehensive (09.06.19 @ 05:01)    Blood Gas Venous - Lactate: 2.6: Please note updated reference range. mmol/L    Blood Gas Venous - Chloride: 104 mmol/L    Blood Gas Venous - Creatinine: 0.98 mg/dL    pH, Venous: 7.42 pH    pCO2, Venous: 43 mmHg    pO2, Venous: 24 mmHg    HCO3, Venous: 25 mmol/L    Blood Gas Venous - FIO2: 21    Base Excess, Venous: 3.4: REFERENCE RANGE = -3 + 2 mmol/L mmol/L    Oxygen Saturation, Venous: 33.2 %    Blood Gas Venous - Sodium: 136 mmol/L    Blood Gas Venous - Potassium: 4.4 mmol/L    Blood Gas Venous - Glucose: 118 mg/dL    Blood Gas Venous - Hemoglobin: 16.1: Delta: 10.9 on 09/02/  Delta: 10.9 on 09/02/ g/dL    Blood Gas Venous - Hematocrit: 49.2: Delta: 33.7 on 09/02/  Delta: 33.7 on 09/02/ %      < from: US Abdomen Limited (09.06.19 @ 07:11) >    FINDINGS:    Liver: The liver appears echogenic, consistent with fatty infiltration.   Previous 1 cm hypervascular lesion seen on CT 9/2/2016 is not visualized.    Bile ducts: Normal caliber. Common bile duct measures 2 mm.     Gallbladder: Within normal limits.        Pancreas: Limited examination due to overlying bowel gas    Right kidney: 10.7 cm. No hydronephrosis.    Ascites: None.    IVC: Visualized portions are within normal limits.    IMPRESSION:     Hepatic steatosis.        < from: 12 Lead ECG (09.06.19 @ 03:47) >    Ventricular Rate 88 BPM    Atrial Rate 88 BPM    P-R Interval 150 ms    QRS Duration 92 ms    Q-T Interval 374 ms    QTC Calculation(Bezet) 452 ms    P Axis 56 degrees    R Axis 36 degrees    T Axis 39 degrees    Diagnosis Line Normal sinus rhythm  Normal ECG 15.6   6.08  )-----------( 199      ( 06 Sep 2019 05:01 )             45.8       09-06    138  |  97<L>  |  15  ----------------------------<  126<H>  4.5   |  24  |  0.92    Ca    9.2      06 Sep 2019 05:01    TPro  7.9  /  Alb  4.7  /  TBili  1.7<H>  /  DBili  x   /  AST  2400<H>  /  ALT  1058<H>  /  AlkPhos  65  09-06            PT/INR - ( 06 Sep 2019 10:10 )   PT: 13.5 SEC;   INR: 1.18          PTT - ( 06 Sep 2019 10:10 )  PTT:30.3 SEC    Toxicology Screen, Drugs of Abuse, Serum (09.06.19 @ 10:30)    Acetaminophen Level, Serum: < 15.0:     Salicylate Level, Serum: < 5.0:     Ethanol, Whole Blood: < 10:       Blood Gas Venous Comprehensive (09.06.19 @ 05:01)    Blood Gas Venous - Lactate: 2.6: Please note updated reference range. mmol/L    Blood Gas Venous - Chloride: 104 mmol/L    Blood Gas Venous - Creatinine: 0.98 mg/dL    pH, Venous: 7.42 pH    pCO2, Venous: 43 mmHg    pO2, Venous: 24 mmHg    HCO3, Venous: 25 mmol/L    Blood Gas Venous - FIO2: 21    Base Excess, Venous: 3.4: REFERENCE RANGE = -3 + 2 mmol/L mmol/L    Oxygen Saturation, Venous: 33.2 %    Blood Gas Venous - Sodium: 136 mmol/L    Blood Gas Venous - Potassium: 4.4 mmol/L    Blood Gas Venous - Glucose: 118 mg/dL    Blood Gas Venous - Hemoglobin: 16.1: Delta: 10.9 on 09/02/  Delta: 10.9 on 09/02/ g/dL    Blood Gas Venous - Hematocrit: 49.2: Delta: 33.7 on 09/02/  Delta: 33.7 on 09/02/ %      < from: US Abdomen Limited (09.06.19 @ 07:11) >    FINDINGS:    Liver: The liver appears echogenic, consistent with fatty infiltration.   Previous 1 cm hypervascular lesion seen on CT 9/2/2016 is not visualized.    Bile ducts: Normal caliber. Common bile duct measures 2 mm.     Gallbladder: Within normal limits.        Pancreas: Limited examination due to overlying bowel gas    Right kidney: 10.7 cm. No hydronephrosis.    Ascites: None.    IVC: Visualized portions are within normal limits.    IMPRESSION:     Hepatic steatosis.        < from: 12 Lead ECG (09.06.19 @ 03:47) >    Ventricular Rate 88 BPM    Atrial Rate 88 BPM    P-R Interval 150 ms    QRS Duration 92 ms    Q-T Interval 374 ms    QTC Calculation(Bezet) 452 ms    Diagnosis Line Normal sinus rhythm  Normal ECG 15.6   6.08  )-----------( 199      ( 06 Sep 2019 05:01 )             45.8       09-06    138  |  97<L>  |  15  ----------------------------<  126<H>  4.5   |  24  |  0.92    Ca    9.2      06 Sep 2019 05:01    TPro  7.9  /  Alb  4.7  /  TBili  1.7<H>  /  DBili  x   /  AST  2400<H>  /  ALT  1058<H>  /  AlkPhos  65  09-06            PT/INR - ( 06 Sep 2019 10:10 )   PT: 13.5 SEC;   INR: 1.18          PTT - ( 06 Sep 2019 10:10 )  PTT:30.3 SEC    Toxicology Screen, Drugs of Abuse, Serum (09.06.19 @ 10:30)    Acetaminophen Level, Serum: < 15.0:     Salicylate Level, Serum: < 5.0:     Ethanol, Whole Blood: < 10:       Blood Gas Venous Comprehensive (09.06.19 @ 05:01)    Blood Gas Venous - Lactate: 2.6: Please note updated reference range. mmol/L    Blood Gas Venous - Chloride: 104 mmol/L    Blood Gas Venous - Creatinine: 0.98 mg/dL    pH, Venous: 7.42 pH    pCO2, Venous: 43 mmHg    pO2, Venous: 24 mmHg    HCO3, Venous: 25 mmol/L    Blood Gas Venous - FIO2: 21    Base Excess, Venous: 3.4: REFERENCE RANGE = -3 + 2 mmol/L mmol/L    Oxygen Saturation, Venous: 33.2 %    Blood Gas Venous - Sodium: 136 mmol/L    Blood Gas Venous - Potassium: 4.4 mmol/L    Blood Gas Venous - Glucose: 118 mg/dL    Blood Gas Venous - Hemoglobin: 16.1: Delta: 10.9 on 09/02/  Delta: 10.9 on 09/02/ g/dL    Blood Gas Venous - Hematocrit: 49.2: Delta: 33.7 on 09/02/  Delta: 33.7 on 09/02/ %      < from: US Abdomen Limited (09.06.19 @ 07:11) >    FINDINGS:    Liver: The liver appears echogenic, consistent with fatty infiltration.   Previous 1 cm hypervascular lesion seen on CT 9/2/2016 is not visualized.    Bile ducts: Normal caliber. Common bile duct measures 2 mm.     Gallbladder: Within normal limits.        Pancreas: Limited examination due to overlying bowel gas    Right kidney: 10.7 cm. No hydronephrosis.    Ascites: None.    IVC: Visualized portions are within normal limits.    IMPRESSION:     Hepatic steatosis.        < from: 12 Lead ECG (09.06.19 @ 03:47) > personally reviewed    Ventricular Rate 88 BPM    Atrial Rate 88 BPM    P-R Interval 150 ms    QRS Duration 92 ms    Q-T Interval 374 ms    QTC Calculation(Bezet) 452 ms    Diagnosis Line Normal sinus rhythm  Normal ECG

## 2019-09-06 NOTE — H&P ADULT - PROBLEM SELECTOR PLAN 3
- pt with mild diffuse wheezing on exam  - c/w Symbicort and duonebs PRN - /104 in the ED  - given hydralazine 5mg x1  - will c/w lisinopril 5mg (home dose) given no JAVIER

## 2019-09-06 NOTE — H&P ADULT - PROBLEM SELECTOR PLAN 5
- IMPROVE score <1, SCDs for DVT prophylaxis  - protonix 40mg daily given hx of gastritis/GERD  - Diet: DASH, if tolerated
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
## 2019-09-06 NOTE — CONSULT NOTE ADULT - ASSESSMENT
58 year old man with history of alcohol abuse, hepatic steatosis, esophagitis/gastritis/duodenitis (EGD 09/2016), HTN, asthma and depression who presents for nausea and vomiting in setting of increased alcohol use found to have severely elevated liver enzymes with intact hepatic synthetic function.      1) Hepatocellular liver injury: AST and ALT >15x upper limit of normal. AST>ALT 2:1 typical for alcoholic abuse. Suspect mild alcoholic hepatitis (Maddrey's DF 3.5)  2) Abdominal pain: Differential includes alcoholic gastritis vs. duodenitis. History of esophageal thickening on CT abd/pelvis and on gastritis/esophagitis/duodenitis on EGD from 09/2016. Pathology for H. pylori negative in 09/2016.       Recommendations:    - check acute viral hepatitis panel, Hep B total core Ab, Hep E Ab  - trend CMP, INR and platelets daily  - alcohol cessation counselling and SW consult 58 year old man with history of alcohol abuse, hepatic steatosis, esophagitis/gastritis/duodenitis (EGD 09/2016), HTN, asthma and depression who presents for nausea and vomiting in setting of increased alcohol use found to have severely elevated liver enzymes with intact hepatic synthetic function.      1) Hepatocellular liver injury: AST and ALT >15x upper limit of normal. AST>ALT 2:1 typical for alcoholic abuse. Suspect mild alcoholic hepatitis (Maddrey's DF 3.5)  2) Abdominal pain: Differential includes alcoholic gastritis vs. duodenitis. History of esophageal thickening on CT abd/pelvis and on gastritis/esophagitis/duodenitis on EGD from 09/2016. Pathology for H. pylori negative in 09/2016.       Recommendations:  - check acute viral hepatitis panel, Hep E IgM, Hep A IgM, Hep B total core Ab   - trend CMP, INR and platelets daily  - alcohol cessation counselling and SW consult 58 year old man with history of alcohol abuse, hepatic steatosis, esophagitis/gastritis/duodenitis (EGD 09/2016), HTN, asthma and depression who presents for nausea and vomiting in setting of increased alcohol use found to have severely elevated liver enzymes with intact hepatic synthetic function.      1) Hepatocellular liver injury: AST and ALT >15x upper limit of normal. AST>ALT 2:1 typical for alcoholic abuse. Suspect mild alcoholic hepatitis (Maddrey's DF 3.5) vs. drug-induced liver injury (DILI) vs. AIH vs. viral hepatitis.   2) Abdominal pain: Differential includes alcoholic gastritis vs. duodenitis. History of esophageal thickening on CT abd/pelvis and on gastritis/esophagitis/duodenitis on EGD from 09/2016. Pathology for H. pylori negative in 09/2016.       Recommendations:  - check acute viral hepatitis panel, Hep E IgM, Hep A IgM, Hep B total core Ab   - check serum DENA, anti-mitochondrial Ab, anti-smooth muscle Ab  - trend CMP, INR and platelets daily  - alcohol cessation counselling and SW consult 58 year old man with history of alcohol abuse, hepatic steatosis, esophagitis/gastritis/duodenitis (EGD 09/2016), HTN, asthma and depression who presents for nausea and vomiting in setting of increased alcohol use found to have severely elevated liver enzymes with intact hepatic synthetic function.      # Hepatocellular liver injury: AST and ALT >15x upper limit of normal. AST>ALT 2:1 typical for alcoholic abuse. Suspect mild alcoholic hepatitis (Maddrey's DF 3.5) vs. drug-induced liver injury (DILI) vs. AIH vs. viral hepatitis.  # Abdominal pain: No acute findings on US abdomen. Differential includes alcoholic gastritis vs. duodenitis. History of esophageal thickening on CT abd/pelvis and history of gastritis/esophagitis/duodenitis on EGD from 09/2016. Pathology for H. pylori negative in 09/2016.   # Hypertension: BP currently uncontrolled. On lisinopril.   # Asthma: Wheezes on lung exam.      Recommendations:  - check acute viral hepatitis panel, Hep E IgM, Hep A IgM, Hep B total core Ab   - check serum DENA, anti-mitochondrial Ab, anti-smooth muscle Ab  - trend CMP, INR and platelets daily  - start pantoprazole 40 mg daily  - Maalox PRN to improve tolerance to PO   - start clear liquid diet and advance as tolerated   - alcohol cessation counselling and SW consult      Plan to be discussed with Attending    Sherron Cordova PGY-4  Gastroenterology Fellow  Pager #[phone] or [phone]  Pager #[phone] 5pm-7am on weekdays, and on weekends 58 year old man with history of alcohol abuse, hepatic steatosis, esophagitis/gastritis/duodenitis (EGD 09/2016), HTN, asthma and depression who presents for nausea and vomiting in setting of increased alcohol use found to have severely elevated liver enzymes with intact hepatic synthetic function.      # Hepatocellular liver injury: AST and ALT >15x upper limit of normal. AST>ALT 2:1 typical for alcoholic abuse. Suspect ischemic hepatitis vs. drug-induced liver injury (DILI) vs. AIH vs. viral hepatitis. Cannot exclude component of mild alcoholic hepatitis (Maddrey's DF 3.5).    # Abdominal pain: No acute findings on US abdomen. Differential includes alcoholic gastritis vs. duodenitis. History of esophageal thickening on CT abd/pelvis and history of gastritis/esophagitis/duodenitis on EGD from 09/2016. Pathology for H. pylori negative in 09/2016.   # Hypertension: BP currently uncontrolled. On lisinopril.   # Asthma: Wheezes on lung exam.      Recommendations:  - check acute viral hepatitis panel, Hep E IgM, Hep A IgM, Hep B total core Ab   - check serum DENA, anti-mitochondrial Ab, anti-smooth muscle Ab  - trend CMP, INR and platelets daily  - start pantoprazole 40 mg daily  - Maalox PRN to improve tolerance to PO   - would start clear liquid diet and advance as tolerated   - alcohol cessation counselling and SW consult      Plan to be discussed with Attending    Sherron Cordova PGY-4  Gastroenterology Fellow  Pager #[phone] or [phone]  Pager #[phone] 5pm-7am on weekdays, and on weekends 58 year old man with history of alcohol abuse, hepatic steatosis, esophagitis/gastritis/duodenitis (EGD 09/2016), HTN, asthma and depression who presents for nausea and vomiting in setting of increased alcohol use found to have severely elevated liver enzymes with intact hepatic synthetic function.      # Hepatocellular liver injury: AST and ALT >15x upper limit of normal. AST>ALT 2:1 typical for alcoholic abuse. DD includes acute viral hepatitis (A, B, E, HSV, CMV, unlikely C) vs. drug-induced liver injury (DILI) vs. AIH. Unlikely passage of a gallstone. Bilirubin and PT low.    # Abdominal pain: No acute findings on US abdomen. Differential includes hepatitis with stretch of hepatic capsule; alcoholic gastritis vs. duodenitis. History of esophageal thickening on CT abd/pelvis and history of gastritis/esophagitis/duodenitis on EGD from 09/2016. Pathology for H. pylori negative in 09/2016.   # Hypertension: BP currently uncontrolled. On lisinopril.   # Asthma: Wheezes on lung exam.      Recommendations:  - check acute viral hepatitis panel, Hep E IgM, Hep A IgM, Hep B total core Ab   - check serum DENA, anti-mitochondrial Ab, anti-smooth muscle Ab  - trend CMP, INR and platelets daily    - Maalox PRN to improve tolerance to PO   - diet as tolerated   - alcohol cessation counselling and SW consult      Plan to be discussed with Attending    Sherron Cordova PGY-4  Gastroenterology Fellow  Pager #[phone] or [phone]  Pager #[phone] 5pm-7am on weekdays, and on weekends
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
## 2019-09-06 NOTE — H&P ADULT - PROBLEM SELECTOR PLAN 4
- thiamine 500mg TID x 3 days  - MVI   - no CIWA protocol at this time as patient reports not being a heavy drinker - pt with mild diffuse wheezing on exam  - c/w Symbicort and duonebs PRN

## 2019-09-06 NOTE — H&P ADULT - NSHPREVIEWOFSYSTEMS_GEN_ALL_CORE
REVIEW OF SYSTEMS:    CONSTITUTIONAL: No weakness, fevers or chills  EYES: No visual changes; No blurry vision  ENT:  No pain or stiffness; No vertigo or throat pain  RESPIRATORY: + mild cough, no wheezing, hemoptysis; No shortness of breath  CARDIOVASCULAR: No chest pain or palpitations  GASTROINTESTINAL: + epigastric pain. + nausea, vomiting; no hematemesis; No diarrhea or constipation. No melena or hematochezia.  GENITOURINARY: No dysuria, frequency or hematuria  NEUROLOGICAL: No numbness, No HA  SKIN: No itching, rashes  MSK: + left hip pain, + leg cramps, no muscle pain  PSYCH: + feelings of stress and depression, + passive SI without plan, no HI

## 2019-09-06 NOTE — H&P ADULT - PROBLEM SELECTOR PLAN 1
- initial AST 2400/ALT 1058, US showed hepatic steatosis without ascites or cirrhosis indicating an acute process    - MELD Score 10- hepatology consulted, awaiting recommendations  - Maddrey Score 13.2- no need for prednisolone  - f/u acute hepatitis panel - initial AST 2400/ALT 1058, US showed hepatic steatosis without ascites or cirrhosis indicating an acute process    - MELD Score 12, indicating <2% 90 day mortality rate- hepatology consulted, awaiting recommendations  - Maddrey Score 13.2- no need for prednisolone  - f/u acute hepatitis panel & autoimmune hepatitis labs  - zofran PRN for associated nausea - initial AST 2400/ALT 1058, US showed hepatic steatosis without ascites or cirrhosis indicating an acute process likely alcoholic hepatitis and possible hepatocellular injury from OTC herbal supplements patient was taking PTA  - MELD Score 12, indicating <2% 90 day mortality rate- hepatology consulted, awaiting recommendations  - Maddrey Score 13.2- no need for prednisolone  - f/u acute hepatitis panel & autoimmune hepatitis labs  - f/u serum DENA, anti-mitochondrial Ab, anti-smooth muscle Ab  - zofran PRN for associated nausea

## 2019-09-06 NOTE — ED ADULT NURSE NOTE - NSIMPLEMENTINTERV_GEN_ALL_ED
Implemented All Fall Risk Interventions:  [place] to call system. Call bell, personal items and telephone within reach. Instruct patient to call for assistance. Room bathroom lighting operational. Non-slip footwear when patient is off stretcher. Physically safe environment: no spills, clutter or unnecessary equipment. Stretcher in lowest position, wheels locked, appropriate side rails in place. Provide visual cue, wrist band, yellow gown, etc. Monitor gait and stability. Monitor for mental status changes and reorient to person, place, and time. Review medications for side effects contributing to fall risk. Reinforce activity limits and safety measures with patient and family.

## 2019-09-06 NOTE — H&P ADULT - NSHPPHYSICALEXAM_GEN_ALL_CORE
Vital Signs Last 24 Hrs  T(C): 36.6 (06 Sep 2019 10:51), Max: 36.9 (06 Sep 2019 05:09)  T(F): 97.8 (06 Sep 2019 10:51), Max: 98.5 (06 Sep 2019 05:09)  HR: 75 (06 Sep 2019 10:51) (75 - 93)  BP: 179/104 (06 Sep 2019 10:51) (148/84 - 179/104)  BP(mean): --  RR: 19 (06 Sep 2019 10:51) (17 - 19)  SpO2: 99% (06 Sep 2019 10:51) (98% - 100%) Vital Signs Last 24 Hrs  T(C): 36.6 (06 Sep 2019 10:51), Max: 36.9 (06 Sep 2019 05:09)  T(F): 97.8 (06 Sep 2019 10:51), Max: 98.5 (06 Sep 2019 05:09)  HR: 75 (06 Sep 2019 10:51) (75 - 93)  BP: 179/104 (06 Sep 2019 10:51) (148/84 - 179/104)  BP(mean): --  RR: 19 (06 Sep 2019 10:51) (17 - 19)  SpO2: 99% (06 Sep 2019 10:51) (98% - 100%)    PHYSICAL EXAM:  GENERAL: in no acute distress, well-nourished, well-developed  HEAD:  Atraumatic, Normocephalic  EYES: conjunctiva and sclera clear, tracking with eyes  ENMT: No tonsillar erythema, exudates, or enlargement; Moist mucous membranes, fair dentition, no lesions, no sublingual jaundice  NECK: Supple, No JVD, full ROM  CHEST/LUNG: mild diffuse wheezing on ausculation bilaterally; No rales, rhonchi, or rubs  HEART: Regular rate and rhythm; No murmurs, rubs, or gallops  ABDOMEN: Soft, Nontender, moderately distended; Bowel sounds present  EXTREMITIES:  2+ Peripheral Pulses, No clubbing, cyanosis, or edema  LYMPH: No lymphadenopathy noted  SKIN: No rashes or lesions  NERVOUS SYSTEM:  Alert & Oriented x2, fair concentration; mild upper extremity tremor bilaterally, no asterixis Vital Signs Last 24 Hrs  T(C): 36.6 (06 Sep 2019 10:51), Max: 36.9 (06 Sep 2019 05:09)  T(F): 97.8 (06 Sep 2019 10:51), Max: 98.5 (06 Sep 2019 05:09)  HR: 75 (06 Sep 2019 10:51) (75 - 93)  BP: 179/104 (06 Sep 2019 10:51) (148/84 - 179/104)  BP(mean): --  RR: 19 (06 Sep 2019 10:51) (17 - 19)  SpO2: 99% (06 Sep 2019 10:51) (98% - 100%)    PHYSICAL EXAM:  GENERAL: in no acute distress, well-nourished, well-developed  HEAD:  no obvious deformity  EYES: conjunctiva and sclera clear, tracking with eyes  ENMT: No tonsillar erythema, exudates, or enlargement; Moist mucous membranes, fair dentition, no lesions, no sublingual jaundice  NECK: Supple, No JVD, full ROM  CHEST/LUNG: mild diffuse wheezing on ausculation bilaterally; No rales, rhonchi, or rubs  HEART: Regular rate and rhythm; No murmurs, rubs, or gallops  ABDOMEN: Soft, Nontender, moderately distended; Bowel sounds present  EXTREMITIES:  2+ Peripheral Pulses, No clubbing, cyanosis, or edema  LYMPH: No lymphadenopathy noted  SKIN: No rashes or lesions  NERVOUS SYSTEM:  Alert & Oriented x2, fair concentration; mild upper extremity tremor bilaterally, no asterixis

## 2019-09-06 NOTE — ED PROVIDER NOTE - ATTENDING CONTRIBUTION TO CARE
59 y/o M with h/o HTN, hyperlipidemia, asthma, GERD, ETOH abuse here with vomiting.  Pt reports nausea and nbnb vomiting, inability to tolerate any POs in the past 3 days.  He endorses a h/o alcohol abuse in the past (no h/o withdrawals or seizures), but states he has cut down a lot on his drinking.  His last drink was a month ago, but then he states he had a few beers this weekend for the Labor Day holiday.  Since then, he has not been able to tolerate PO, although he does report "hunger pains".  No fever, chills, cp, sob, back pain, diarrhea, urinary sxs.  Well appearing, lying comfortably in stretcher, awake and alert, nontoxic.  VSS.  Lungs cta bl.  Cards nl S1/S2, RRR, no MRG.  Abd soft ntnd.  No pedal edema or calf tenderness.  Plan for ekg, labs incl lipase, antiemetics, ivfs, reassess.  Poss alcoholic gastritis vs pancreatitis.  No abd tenderness to suggest biliary disease or other acute intraabd process. Will reassess after meds.

## 2019-09-06 NOTE — H&P ADULT - ATTENDING COMMENTS
Patient was seen and examined personally by me. I have discussed the plan and reviewed the resident's note and agree with the above physical exam findings including assessment and plan except as indicated below.    Agree with above

## 2019-09-06 NOTE — H&P ADULT - NSICDXPASTMEDICALHX_GEN_ALL_CORE_FT
PAST MEDICAL HISTORY:  Asthma     ETOH abuse     Hypertension PAST MEDICAL HISTORY:  Asthma     Chronic GERD     ETOH abuse     Gastritis     Gout     Hypertension

## 2019-09-06 NOTE — H&P ADULT - HISTORY OF PRESENT ILLNESS
Patient is a 58 year old male PMH HTN, asthma, HLD, GERD, presents to the ED with nausea and vomiting. Patient states 3 nights ago he began with unprovoked nausea and vomiting. He has since been unable to keep down any food or liquids. Associated with a crampy pain in his upper abdomen that is intermittent. Last BM 3 days ago. Denies fever but endorses chills and lethargy. Denies sick contacts, recent travel, or recent antibiotic use. States he has been under a lot of stress lately due to the passing of a close friend 2 weeks ago. No SI/HI. Denies headache, visual changes, chest pain, shortness of breath, diarrhea, or dysuria. 58 year old man PMH HTN, asthma, HLD, etoh abuse, hx of gastric ulcers/gastritis/duodenitis (EGD 2016)  presents to the ED with nausea and vomiting. Patient states 3 nights ago he began with unprovoked nausea and vomiting. He has since been unable to keep down any food or liquids. Associated with a crampy pain in his upper abdomen that is intermittent. Last BM 3 days ago. Denies fever but endorses chills and lethargy. Denies sick contacts, recent travel, or recent antibiotic use. States he has been under a lot of stress lately due to the passing of a close friend 2 weeks ago. No SI/HI. Denies headache, visual changes, chest pain, shortness of breath, diarrhea, or dysuria.      In the ED, Initial VS T 98.3 HR 93 /84 RR 17 100% on RA. Maalox, duoneb, 2 liters NS.      acetaminophen <15, ethanol <10 59 yo M with PMHx HTN, asthma, HLD, etoh abuse, hx of gastric ulcers/gastritis/duodenitis (EGD 2016), GERD, gout presents to the ED with nausea, vomiting and po intolerance x3 days. Patient is a poor historian however states 3 nights ago he began with unprovoked nausea and vomiting. He has since been unable to keep down any food or liquids. The nausea and vomiting has been associated with a crampy pain in his upper abdomen (epigastric and left sided) that is intermittent. For the pain, he reports taking 500mg of acetaminophen ~3-4 times. Reports 3-4 episodes of emesis yesterday with "small specks of blood" seen. Has tried Tums and Pepto Bismol without relief. Reports that his last BM was 3 days ago. Denies fever/chills. Endorses lethargy and overall feeling of weakness due to not eating. Denies sick contacts, recent travel, or recent antibiotic use. States he has been under a lot of stress lately due to the passing of a close friend 2 weeks ago. Endorses passive SI without plan. Denies HI. Denies headache, visual changes, chest pain, shortness of breath, diarrhea, rashes or dysuria.    Of note, patient is mildly confused, oriented to person and place but not date/time.    In the ED, Initial VS T 98.3 HR 93 /84 RR 17 100% on RA. AST 2400/ ALT 1058, Lactate 2.1,  acetaminophen <15, ethanol <10 . Given Maalox, duonebs, and 2 liters NS. 59 yo M with PMHx HTN, asthma, HLD, etoh abuse, hx of gastric ulcers/gastritis/duodenitis (EGD 2016), GERD, gout presents to the ED with nausea, vomiting and po intolerance x3 days. Patient is a poor historian however states 3 nights ago he began with unprovoked nausea and vomiting. He has since been unable to keep down any food or liquids. The nausea and vomiting has been associated with a crampy pain in his upper abdomen (epigastric and left sided) that is intermittent. For the pain, he reports taking 500mg of acetaminophen ~3-4 times. Reports 3-4 episodes of emesis yesterday with "small specks of blood" seen. Has tried Tums and Pepto Bismol without relief. Reports that his last BM was 3 days ago. Denies fever/chills. Endorses lethargy and overall feeling of weakness due to not eating. Denies sick contacts, recent travel, or recent antibiotic use. States he has been under a lot of stress lately due to the passing of a close friend 2 weeks ago and endorses increased alcohol use. Reports having 1-2 beers ~4 days ago. Endorses passive SI without plan. Denies HI. Denies headache, visual changes, chest pain, shortness of breath, diarrhea, rashes or dysuria.    Of note, patient is mildly confused, oriented to person and place but not date/time.    In the ED, Initial VS T 98.3 HR 93 /84 RR 17 100% on RA. AST 2400/ ALT 1058, Lactate 2.1,  acetaminophen <15, ethanol <10 . Given Maalox, duonebs, and 2 liters NS.

## 2019-09-06 NOTE — ED ADULT NURSE REASSESSMENT NOTE - NS ED NURSE REASSESS COMMENT FT1
report given to GAYATHRI Michele [phone]. pt to go to bed 98b, pending transport, will con to monitor.

## 2019-09-07 ENCOUNTER — TRANSCRIPTION ENCOUNTER (OUTPATIENT)
Age: 59
End: 2019-09-07

## 2019-09-07 DIAGNOSIS — E78.1 PURE HYPERGLYCERIDEMIA: ICD-10-CM

## 2019-09-07 LAB
ALBUMIN SERPL ELPH-MCNC: 3.8 G/DL — SIGNIFICANT CHANGE UP (ref 3.3–5)
ALP SERPL-CCNC: 64 U/L — SIGNIFICANT CHANGE UP (ref 40–120)
ALT FLD-CCNC: 683 U/L — HIGH (ref 4–41)
AMPHET UR-MCNC: NEGATIVE — SIGNIFICANT CHANGE UP
ANION GAP SERPL CALC-SCNC: 12 MMO/L — SIGNIFICANT CHANGE UP (ref 7–14)
AST SERPL-CCNC: 865 U/L — HIGH (ref 4–40)
BARBITURATES UR SCN-MCNC: NEGATIVE — SIGNIFICANT CHANGE UP
BASOPHILS # BLD AUTO: 0.08 K/UL — SIGNIFICANT CHANGE UP (ref 0–0.2)
BASOPHILS NFR BLD AUTO: 2 % — SIGNIFICANT CHANGE UP (ref 0–2)
BASOPHILS NFR SPEC: 1.8 % — SIGNIFICANT CHANGE UP (ref 0–2)
BENZODIAZ UR-MCNC: POSITIVE — SIGNIFICANT CHANGE UP
BILIRUB SERPL-MCNC: 1.1 MG/DL — SIGNIFICANT CHANGE UP (ref 0.2–1.2)
BLASTS # FLD: 0 % — SIGNIFICANT CHANGE UP (ref 0–0)
BUN SERPL-MCNC: 8 MG/DL — SIGNIFICANT CHANGE UP (ref 7–23)
CALCIUM SERPL-MCNC: 8.2 MG/DL — LOW (ref 8.4–10.5)
CANNABINOIDS UR-MCNC: NEGATIVE — SIGNIFICANT CHANGE UP
CHLORIDE SERPL-SCNC: 105 MMOL/L — SIGNIFICANT CHANGE UP (ref 98–107)
CO2 SERPL-SCNC: 22 MMOL/L — SIGNIFICANT CHANGE UP (ref 22–31)
COCAINE METAB.OTHER UR-MCNC: NEGATIVE — SIGNIFICANT CHANGE UP
CREAT SERPL-MCNC: 0.83 MG/DL — SIGNIFICANT CHANGE UP (ref 0.5–1.3)
EOSINOPHIL # BLD AUTO: 0.67 K/UL — HIGH (ref 0–0.5)
EOSINOPHIL NFR BLD AUTO: 17 % — HIGH (ref 0–6)
EOSINOPHIL NFR FLD: 16.5 % — HIGH (ref 0–6)
FERRITIN SERPL-MCNC: 1928 NG/ML — HIGH (ref 30–400)
GLUCOSE SERPL-MCNC: 153 MG/DL — HIGH (ref 70–99)
HAV IGM SER-ACNC: NONREACTIVE — SIGNIFICANT CHANGE UP
HBV CORE AB SER-ACNC: NONREACTIVE — SIGNIFICANT CHANGE UP
HBV CORE IGM SER-ACNC: NONREACTIVE — SIGNIFICANT CHANGE UP
HBV SURFACE AB SER-ACNC: SIGNIFICANT CHANGE UP
HBV SURFACE AG SER-ACNC: NONREACTIVE — SIGNIFICANT CHANGE UP
HCT VFR BLD CALC: 39.4 % — SIGNIFICANT CHANGE UP (ref 39–50)
HCT VFR BLD CALC: 40.6 % — SIGNIFICANT CHANGE UP (ref 39–50)
HCV AB S/CO SERPL IA: 0.11 S/CO — SIGNIFICANT CHANGE UP (ref 0–0.99)
HCV AB SERPL-IMP: SIGNIFICANT CHANGE UP
HGB BLD-MCNC: 12.9 G/DL — LOW (ref 13–17)
HGB BLD-MCNC: 13 G/DL — SIGNIFICANT CHANGE UP (ref 13–17)
IMM GRANULOCYTES NFR BLD AUTO: 0 % — SIGNIFICANT CHANGE UP (ref 0–1.5)
IRON SATN MFR SERPL: 239 UG/DL — HIGH (ref 45–165)
IRON SATN MFR SERPL: 239 UG/DL — SIGNIFICANT CHANGE UP (ref 155–535)
LYMPHOCYTES # BLD AUTO: 2.09 K/UL — SIGNIFICANT CHANGE UP (ref 1–3.3)
LYMPHOCYTES # BLD AUTO: 53 % — HIGH (ref 13–44)
LYMPHOCYTES NFR SPEC AUTO: 41.7 % — SIGNIFICANT CHANGE UP (ref 13–44)
MAGNESIUM SERPL-MCNC: 1.7 MG/DL — SIGNIFICANT CHANGE UP (ref 1.6–2.6)
MCHC RBC-ENTMCNC: 29.6 PG — SIGNIFICANT CHANGE UP (ref 27–34)
MCHC RBC-ENTMCNC: 29.9 PG — SIGNIFICANT CHANGE UP (ref 27–34)
MCHC RBC-ENTMCNC: 32 % — SIGNIFICANT CHANGE UP (ref 32–36)
MCHC RBC-ENTMCNC: 32.7 % — SIGNIFICANT CHANGE UP (ref 32–36)
MCV RBC AUTO: 91.2 FL — SIGNIFICANT CHANGE UP (ref 80–100)
MCV RBC AUTO: 92.5 FL — SIGNIFICANT CHANGE UP (ref 80–100)
METAMYELOCYTES # FLD: 0 % — SIGNIFICANT CHANGE UP (ref 0–1)
METHADONE UR-MCNC: NEGATIVE — SIGNIFICANT CHANGE UP
MONOCYTES # BLD AUTO: 0.53 K/UL — SIGNIFICANT CHANGE UP (ref 0–0.9)
MONOCYTES NFR BLD AUTO: 13.5 % — SIGNIFICANT CHANGE UP (ref 2–14)
MONOCYTES NFR BLD: 8.7 % — SIGNIFICANT CHANGE UP (ref 2–9)
MYELOCYTES NFR BLD: 0 % — SIGNIFICANT CHANGE UP (ref 0–0)
NEUTROPHIL AB SER-ACNC: 20 % — LOW (ref 43–77)
NEUTROPHILS # BLD AUTO: 0.57 K/UL — LOW (ref 1.8–7.4)
NEUTROPHILS NFR BLD AUTO: 14.5 % — LOW (ref 43–77)
NEUTS BAND # BLD: 0 % — SIGNIFICANT CHANGE UP (ref 0–6)
NRBC # FLD: 0 K/UL — SIGNIFICANT CHANGE UP (ref 0–0)
NRBC # FLD: 0.02 K/UL — SIGNIFICANT CHANGE UP (ref 0–0)
OPIATES UR-MCNC: NEGATIVE — SIGNIFICANT CHANGE UP
OTHER - HEMATOLOGY %: 0 — SIGNIFICANT CHANGE UP
OXYCODONE UR-MCNC: NEGATIVE — SIGNIFICANT CHANGE UP
PCP UR-MCNC: NEGATIVE — SIGNIFICANT CHANGE UP
PHOSPHATE SERPL-MCNC: 2.9 MG/DL — SIGNIFICANT CHANGE UP (ref 2.5–4.5)
PLATELET # BLD AUTO: 140 K/UL — LOW (ref 150–400)
PLATELET # BLD AUTO: 148 K/UL — LOW (ref 150–400)
PLATELET COUNT - ESTIMATE: NORMAL — SIGNIFICANT CHANGE UP
PMV BLD: 10.4 FL — SIGNIFICANT CHANGE UP (ref 7–13)
PMV BLD: 10.4 FL — SIGNIFICANT CHANGE UP (ref 7–13)
POIKILOCYTOSIS BLD QL AUTO: SLIGHT — SIGNIFICANT CHANGE UP
POTASSIUM SERPL-MCNC: 3.9 MMOL/L — SIGNIFICANT CHANGE UP (ref 3.5–5.3)
POTASSIUM SERPL-SCNC: 3.9 MMOL/L — SIGNIFICANT CHANGE UP (ref 3.5–5.3)
PROMYELOCYTES # FLD: 0 % — SIGNIFICANT CHANGE UP (ref 0–0)
PROT SERPL-MCNC: 6.5 G/DL — SIGNIFICANT CHANGE UP (ref 6–8.3)
RBC # BLD: 4.32 M/UL — SIGNIFICANT CHANGE UP (ref 4.2–5.8)
RBC # BLD: 4.39 M/UL — SIGNIFICANT CHANGE UP (ref 4.2–5.8)
RBC # FLD: 12.8 % — SIGNIFICANT CHANGE UP (ref 10.3–14.5)
RBC # FLD: 12.8 % — SIGNIFICANT CHANGE UP (ref 10.3–14.5)
SMUDGE CELLS # BLD: PRESENT — SIGNIFICANT CHANGE UP
SODIUM SERPL-SCNC: 139 MMOL/L — SIGNIFICANT CHANGE UP (ref 135–145)
SPHEROCYTES BLD QL SMEAR: SLIGHT — SIGNIFICANT CHANGE UP
UIBC SERPL-MCNC: < 10 UG/DL — LOW (ref 110–370)
VARIANT LYMPHS # BLD: 11.3 % — SIGNIFICANT CHANGE UP
WBC # BLD: 3.94 K/UL — SIGNIFICANT CHANGE UP (ref 3.8–10.5)
WBC # BLD: 4.01 K/UL — SIGNIFICANT CHANGE UP (ref 3.8–10.5)
WBC # FLD AUTO: 3.94 K/UL — SIGNIFICANT CHANGE UP (ref 3.8–10.5)
WBC # FLD AUTO: 4.01 K/UL — SIGNIFICANT CHANGE UP (ref 3.8–10.5)

## 2019-09-07 PROCEDURE — 99233 SBSQ HOSP IP/OBS HIGH 50: CPT | Mod: GC

## 2019-09-07 PROCEDURE — 99232 SBSQ HOSP IP/OBS MODERATE 35: CPT | Mod: GC

## 2019-09-07 PROCEDURE — 71045 X-RAY EXAM CHEST 1 VIEW: CPT | Mod: 26

## 2019-09-07 RX ORDER — FENOFIBRATE,MICRONIZED 130 MG
145 CAPSULE ORAL DAILY
Refills: 0 | Status: DISCONTINUED | OUTPATIENT
Start: 2019-09-07 | End: 2019-09-09

## 2019-09-07 RX ORDER — IPRATROPIUM/ALBUTEROL SULFATE 18-103MCG
3 AEROSOL WITH ADAPTER (GRAM) INHALATION EVERY 6 HOURS
Refills: 0 | Status: COMPLETED | OUTPATIENT
Start: 2019-09-07 | End: 2019-09-09

## 2019-09-07 RX ORDER — AMLODIPINE BESYLATE 2.5 MG/1
5 TABLET ORAL DAILY
Refills: 0 | Status: DISCONTINUED | OUTPATIENT
Start: 2019-09-07 | End: 2019-09-09

## 2019-09-07 RX ADMIN — Medication 1 TABLET(S): at 09:32

## 2019-09-07 RX ADMIN — BUDESONIDE AND FORMOTEROL FUMARATE DIHYDRATE 2 PUFF(S): 160; 4.5 AEROSOL RESPIRATORY (INHALATION) at 09:32

## 2019-09-07 RX ADMIN — LISINOPRIL 5 MILLIGRAM(S): 2.5 TABLET ORAL at 05:37

## 2019-09-07 RX ADMIN — Medication 1 TABLET(S): at 11:24

## 2019-09-07 RX ADMIN — SODIUM CHLORIDE 100 MILLILITER(S): 9 INJECTION, SOLUTION INTRAVENOUS at 05:37

## 2019-09-07 RX ADMIN — Medication 105 MILLIGRAM(S): at 05:35

## 2019-09-07 RX ADMIN — Medication 145 MILLIGRAM(S): at 16:31

## 2019-09-07 RX ADMIN — BUDESONIDE AND FORMOTEROL FUMARATE DIHYDRATE 2 PUFF(S): 160; 4.5 AEROSOL RESPIRATORY (INHALATION) at 22:29

## 2019-09-07 RX ADMIN — Medication 40 MILLIGRAM(S): at 16:35

## 2019-09-07 RX ADMIN — Medication 3 MILLILITER(S): at 22:46

## 2019-09-07 RX ADMIN — Medication 105 MILLIGRAM(S): at 14:02

## 2019-09-07 RX ADMIN — Medication 1 TABLET(S): at 14:02

## 2019-09-07 RX ADMIN — Medication 3 MILLIGRAM(S): at 22:28

## 2019-09-07 RX ADMIN — PANTOPRAZOLE SODIUM 40 MILLIGRAM(S): 20 TABLET, DELAYED RELEASE ORAL at 06:05

## 2019-09-07 RX ADMIN — SODIUM CHLORIDE 100 MILLILITER(S): 9 INJECTION, SOLUTION INTRAVENOUS at 11:25

## 2019-09-07 RX ADMIN — Medication 105 MILLIGRAM(S): at 22:42

## 2019-09-07 NOTE — DISCHARGE NOTE PROVIDER - CARE PROVIDER_API CALL
Vaughn Finch)  Gastroenterology; Internal Medicine  [address] [place] [address], [address]  [place] [address]  Phone: [phone]  Fax: [phone]  Follow Up Time: 2 weeks    Kehinde Hernandez  Phone: [phone]  Fax: (   )    -  Follow Up Time: 1 week Vaughn Finch)  Gastroenterology; Internal Medicine  [address] [place] [address], [address]  [place] [address]  Phone: [phone]  Fax: [phone]  Follow Up Time: 2 weeks    Kehinde Hernandez  Phone: [phone]  Fax: (   )    -  Follow Up Time: 1 week

## 2019-09-07 NOTE — DISCHARGE NOTE PROVIDER - NSDCFUADDAPPT_GEN_ALL_CORE_FT
Follow up with hepatologist within two weeks from discharge. Follow up with hepatologist within two weeks from discharge. Follow up with pulmonologist within one month of discharge.

## 2019-09-07 NOTE — PROGRESS NOTE ADULT - PROBLEM SELECTOR PLAN 2
c/b ETOH gastritis/duodenitis: c/w protonix 40mg qd  - thiamine 500mg TID x 5 days, currently not withdrawing  - symptom triggered CIWA  - Stox negative, Utox pending  -  consult for alcohol cessation - ferritin > 1900. iron 239  - MRI liver to assess iron stores

## 2019-09-07 NOTE — PROGRESS NOTE ADULT - ATTENDING COMMENTS
Patient was seen and examined personally by me. I have discussed the plan and reviewed the resident's note and agree with the above physical exam findings including assessment and plan except as indicated below.    LFTs downtrending, Acute hepatitis panel negative. Elevated ferritin also noted. Will obtain MRI liver, ? hemochromatosis but less likely with nml Hgb  follow up serum DENA, anti-mitochondrial Ab, anti-smooth muscle Ab  Started fenofibrate for isolated hypertriglyceridemia. No prior hx of pancreatitis. Dietary modification discussed. Repeat lipid panel in 1 month  Continues to have wheezing on nebs and symbicort. Start prednisone 40mg x 4 days, switch to ATC nebs. Check CXR

## 2019-09-07 NOTE — DISCHARGE NOTE PROVIDER - NSFOLLOWUPCLINICS_GEN_ALL_ED_FT
A Gastroenterologist  Gastroenterology  .  [place]   Phone:   Fax:   Follow Up Time:     Alcoholics Anonymous - 24 hour hotline  Alcoholics Anonymous  .  [place]   Phone: [phone]  Fax:   Follow Up Time:

## 2019-09-07 NOTE — PROGRESS NOTE ADULT - PROBLEM SELECTOR PLAN 4
- pt with mild diffuse wheezing on exam  - c/w Symbicort and duonebs PRN c/b ETOH gastritis/duodenitis: c/w protonix 40mg qd  - thiamine 500mg TID x 5 days, currently not withdrawing  - symptom triggered CIWA  - Stox negative, Utox pending  -  consult for alcohol cessation

## 2019-09-07 NOTE — CHART NOTE - NSCHARTNOTEFT_GEN_A_CORE
Spoke with GI, ordered for am special lab test for further work up. They requested discontinuing lisinopril.    Started amlodipine 5mg daily instead.    Chuy Walsh PGY-3

## 2019-09-07 NOTE — PROGRESS NOTE ADULT - ASSESSMENT
58 year old man with history of alcohol abuse, hepatic steatosis, esophagitis/gastritis/duodenitis (EGD 09/2016), HTN, asthma and depression who presents for nausea and vomiting in setting of increased alcohol use found to have severely elevated liver enzymes with intact hepatic synthetic function.      # Hepatocellular liver injury: AST and ALT >15x upper limit of normal. DD includes acute viral hepatitis (A, B, E, HSV, EBV, CMV, C) vs. Drug- induced liver injury (DILI - He was taking herbal meds at home) vs. AIH. Unlikely alcoholic hepatitis giving his ALT and AST in 1000s range.  # Hypertension: BP currently uncontrolled. On lisinopril.   # Acute Asthma exacerbation.    Recommendations:  - please check IgG,IgA, and IgM  - check HCV RNA PCR  - check HSV IgM and PCR  - follow up Hep E IgM, CMV and EBV serlogy result  - follow up serum DENA, anti-mitochondrial Ab, anti-smooth muscle Ab  - trend CMP, INR and platelets daily  - continue treatment for asthma exacerbation   - no need for further imaging of the liver ( elevated ferritin is non specific ) 58 year old man with history of alcohol abuse, hepatic steatosis, esophagitis/gastritis/duodenitis (EGD 09/2016), HTN, asthma and depression who presents for nausea and vomiting in setting of increased alcohol use found to have severely elevated liver enzymes with intact hepatic synthetic function.      # Hepatocellular liver injury: AST and ALT >15x upper limit of normal. DD includes acute viral hepatitis (A, B, E, HSV, EBV, CMV, C) vs. Drug- induced liver injury (DILI - He was taking herbal meds at home) vs. AIH. Unlikely alcoholic hepatitis giving his ALT and AST in 1000s range.  # Hypertension: BP currently uncontrolled. On lisinopril.   # Acute Asthma exacerbation.    Recommendations:  - discontinue Lisinopril ( can cause hepatotoxicity - although rare  )   - please check IgG,IgA, and IgM  - check HCV RNA PCR  - check HSV IgM and PCR  - follow up Hep E IgM, CMV and EBV serlogy result  - follow up serum DENA, anti-mitochondrial Ab, anti-smooth muscle Ab  - trend CMP, INR and platelets daily  - continue treatment for asthma exacerbation   - no need for further imaging of the liver ( elevated ferritin is non specific ) 58 year old man with history of alcohol abuse, hepatic steatosis, esophagitis/gastritis/duodenitis (EGD 09/2016), HTN, asthma and depression who presents for nausea and vomiting in setting of increased alcohol use found to have severely elevated liver enzymes with intact hepatic synthetic function.      # Hepatocellular liver injury: AST and ALT >15x upper limit of normal. DD includes acute viral hepatitis (A, B, E, HSV, EBV, CMV, C) vs. Drug- induced liver injury (DILI - He was taking herbal meds at home) vs. AIH. Unlikely alcoholic hepatitis giving his ALT and AST in 1000s range.  # Hypertension: BP currently uncontrolled. On lisinopril.   # Acute Asthma exacerbation.    Recommendations -  discussed with primary team:  - discontinue Lisinopril ( can cause hepatotoxicity - although rare  )   - please check IgG,IgA, and IgM  - check HCV RNA PCR  - check HSV IgM and PCR  - follow up Hep E IgM, CMV and EBV serlogy result  - follow up serum DENA, anti-mitochondrial Ab, anti-smooth muscle Ab  - trend CMP, INR and platelets daily  - continue treatment for asthma exacerbation   - no need for further imaging of the liver ( elevated ferritin is non specific )

## 2019-09-07 NOTE — DISCHARGE NOTE PROVIDER - PROVIDER TOKENS
PROVIDER:[TOKEN:[[id number]],FOLLOWUP:[2 weeks]],FREE:[LAST:[Mary],FIRST:[Kehinde],PHONE:[phone]],FAX:[(   )    -],FOLLOWUP:[1 week]]

## 2019-09-07 NOTE — DISCHARGE NOTE PROVIDER - CARE PROVIDERS DIRECT ADDRESSES
,[email or web address]

## 2019-09-07 NOTE — PROGRESS NOTE ADULT - PROBLEM SELECTOR PLAN 5
- IMPROVE score <1, SCDs for DVT prophylaxis  - protonix 40mg daily given hx of gastritis/GERD  - Diet: DASH, if tolerated - /104 in the ED  - given hydralazine 5mg x1  - will c/w lisinopril 5mg (home dose) given no JAVIER

## 2019-09-07 NOTE — DISCHARGE NOTE PROVIDER - HOSPITAL COURSE
59 yo M with PMHx HTN, asthma, HLD, etoh abuse, hx of gastric ulcers/gastritis/duodenitis (EGD 2016), GERD, gout presents to the ED with nausea, vomiting and po intolerance x3 days. Patient is a poor historian however states 3 nights ago he began with unprovoked nausea and vomiting. He has since been unable to keep down any food or liquids. The nausea and vomiting has been associated with a crampy pain in his upper abdomen (epigastric and left sided) that is intermittent. For the pain, he reports taking 500mg of acetaminophen ~3-4 times. Reports 3-4 episodes of emesis yesterday with "small specks of blood" seen. Has tried Tums and Pepto Bismol without relief. Reports that his last BM was 3 days ago. Denies fever/chills. Endorses lethargy and overall feeling of weakness due to not eating. Denies sick contacts, recent travel, or recent antibiotic use. States he has been under a lot of stress lately due to the passing of a close friend 2 weeks ago and endorses increased alcohol use. Reports having 1-2 beers ~4 days ago. Endorses passive SI without plan. Denies HI. Denies headache, visual changes, chest pain, shortness of breath, diarrhea, rashes or dysuria.        Of note, patient is mildly confused, oriented to person and place but not date/time.        In the ED, Initial VS T 98.3 HR 93 /84 RR 17 100% on RA. AST 2400/ ALT 1058, Lactate 2.1,  acetaminophen <15, ethanol <10 . Given Maalox, duonebs, and 2 liters NS.         Hospital Course:    Patient was monitored with daily CBC, CMP. Transaminitis steadily resolving. Iron and ferritin were found to be elevated concerning for iron overload vs. hemochromatosis. Pt underwent MR of the abdomen which demonstrated ________. Triglycerides were also found to be significantly elevated in the 600s and the patient was started on fenofibrate. Throughout his hospital stay, the patient complained of wheezing but was managed with duonebs and symbicort. He tolerated PO diet well. The patient is considered medically optimized and ready for discharge. 59 yo M with PMHx HTN, asthma, HLD, etoh abuse, hx of gastric ulcers/gastritis/duodenitis (EGD 2016), GERD, gout presents to the ED with nausea, vomiting and po intolerance x3 days. Patient is a poor historian however states 3 nights ago he began with unprovoked nausea and vomiting. He has since been unable to keep down any food or liquids. The nausea and vomiting has been associated with a crampy pain in his upper abdomen (epigastric and left sided) that is intermittent. For the pain, he reports taking 500mg of acetaminophen ~3-4 times. Reports 3-4 episodes of emesis yesterday with "small specks of blood" seen. Has tried Tums and Pepto Bismol without relief. Reports that his last BM was 3 days ago. Denies fever/chills. Endorses lethargy and overall feeling of weakness due to not eating. Denies sick contacts, recent travel, or recent antibiotic use. States he has been under a lot of stress lately due to the passing of a close friend 2 weeks ago and endorses increased alcohol use. Reports having 1-2 beers ~4 days ago. Endorses passive SI without plan. Denies HI. Denies headache, visual changes, chest pain, shortness of breath, diarrhea, rashes or dysuria.        Of note, patient is mildly confused, oriented to person and place but not date/time.        In the ED, Initial VS T 98.3 HR 93 /84 RR 17 100% on RA. AST 2400/ ALT 1058, Lactate 2.1,  acetaminophen <15, ethanol <10 . Given Maalox, duonebs, and 2 liters NS.         Hospital Course:    Patient was monitored with daily CBC, CMP. Transaminitis steadily resolving. Iron and ferritin were found to be elevated concerning for iron overload vs. hemochromatosis. No need for imaging per hepatology. Extensive infectious and autoimmune workup per hepatology. Triglycerides were also found to be significantly elevated in the 600s and the patient was started on fenofibrate. Throughout his hospital stay, the patient complained of wheezing but was managed with duonebs and symbicort. He tolerated PO diet well. The patient is considered medically optimized and ready for discharge. Should follow up outpatient with PCP for resolution of transaminitis. 59 yo M with PMHx HTN, asthma, HLD, etoh abuse, hx of gastric ulcers/gastritis/duodenitis (EGD 2016), GERD, gout presents to the ED with nausea, vomiting and po intolerance x3 days. Patient is a poor historian however states 3 nights ago he began with unprovoked nausea and vomiting. He has since been unable to keep down any food or liquids. The nausea and vomiting has been associated with a crampy pain in his upper abdomen (epigastric and left sided) that is intermittent. For the pain, he reports taking 500mg of acetaminophen ~3-4 times. Reports 3-4 episodes of emesis yesterday with "small specks of blood" seen. Has tried Tums and Pepto Bismol without relief. Reports that his last BM was 3 days ago. Denies fever/chills. Endorses lethargy and overall feeling of weakness due to not eating. Denies sick contacts, recent travel, or recent antibiotic use. States he has been under a lot of stress lately due to the passing of a close friend 2 weeks ago and endorses increased alcohol use. Reports having 1-2 beers ~4 days ago. Endorses passive SI without plan. Denies HI. Denies headache, visual changes, chest pain, shortness of breath, diarrhea, rashes or dysuria.        Of note, patient is mildly confused, oriented to person and place but not date/time.        In the ED, Initial VS T 98.3 HR 93 /84 RR 17 100% on RA. AST 2400/ ALT 1058, Lactate 2.1,  acetaminophen <15, ethanol <10 . Given Maalox, duonebs, and 2 liters NS.         Hospital Course:    Patient was monitored with daily CBC, CMP. Transaminitis steadily resolving. Iron and ferritin were found to be elevated concerning for iron overload vs. hemochromatosis. No need for imaging per hepatology. Extensive infectious and autoimmune workup per hepatology. Triglycerides were also found to be significantly elevated in the 600s and the patient was started on fenofibrate. Throughout his hospital stay, the patient complained of wheezing but was managed with duonebs and symbicort and steroids. He tolerated PO diet well. The patient is considered medically optimized and ready for discharge. Should follow up outpatient with PCP for resolution of transaminitis.

## 2019-09-07 NOTE — PHYSICAL THERAPY INITIAL EVALUATION ADULT - PERTINENT HX OF CURRENT PROBLEM, REHAB EVAL
58 year old male with PMHx HTN, asthma, HLD, etoh abuse, hx of gastric ulcers/gastritis/duodenitis (EGD 2016), GERD, gout presents to the ED with nausea, vomiting and po intolerance x3 days.  US abdomen hepatic steatosis.

## 2019-09-07 NOTE — DISCHARGE NOTE PROVIDER - NSDCCPCAREPLAN_GEN_ALL_CORE_FT
PRINCIPAL DISCHARGE DIAGNOSIS  Diagnosis: Transaminitis  Assessment and Plan of Treatment: You were admitted to the hospital with very high liver enzymes. You were given IV fluids and monitored daily with lab work. Your liver enzymes are returning to normal but please follow up with your primary care doctor to repeat a complete metabolic panel to ensure complete resolution. Please refrain from any herbal or over the counter supplements including, but not limited to, iron, turmeric, and multivitamins. If you start to experience nausea, vomiting, diarrhea, or abdominal pain, please seek medical attention immediately.      SECONDARY DISCHARGE DIAGNOSES  Diagnosis: Hypertriglyceridemia  Assessment and Plan of Treatment: While in the hospital, you were found to have extremely high triglyceride levels. To address this you were started on a medication. Please continue to take this medication as instructed. Please follow up with your primary care doctor to have your complete metabolic panel and lipid panel repeated to ensure adequate treatment. If you begin to experience abdominal pain, back pain, chest pain, nausea, vomiting, or diarrhea, please seek medical attention immediately.    Diagnosis: ETOH abuse  Assessment and Plan of Treatment: You reported significant alcohol use prior to your hospital stay. This could have contributed to your presentation upon arrival at the hospital. Please abstain from alcohol use completely. If you need support, please speak with your primary care doctor about resources to help with alcohol cessation. PRINCIPAL DISCHARGE DIAGNOSIS  Diagnosis: Transaminitis  Assessment and Plan of Treatment: You were admitted to the hospital with very high liver enzymes. You were given IV fluids and monitored daily with lab work. Your liver enzymes are returning to normal but please follow up with your primary care doctor to repeat a complete metabolic panel to ensure complete resolution. Please refrain from any herbal or over the counter supplements including, but not limited to, iron, turmeric, and multivitamins. If you start to experience nausea, vomiting, diarrhea, or abdominal pain, please seek medical attention immediately.      SECONDARY DISCHARGE DIAGNOSES  Diagnosis: Asthma  Assessment and Plan of Treatment: Please continue to use your inhalers as prescribed. Finished prednisone as prescribed. Follow up with primary for monitoring of symptoms and referral.    Diagnosis: ETOH abuse  Assessment and Plan of Treatment: You reported significant alcohol use prior to your hospital stay. This could have contributed to your presentation upon arrival at the hospital. Please abstain from alcohol use completely. If you need support, please speak with your primary care doctor about resources to help with alcohol cessation.    Diagnosis: Hypertriglyceridemia  Assessment and Plan of Treatment: While in the hospital, you were found to have extremely high triglyceride levels. To address this you were started on a medication. Please continue to take this medication as instructed. Please follow up with your primary care doctor to have your complete metabolic panel and lipid panel repeated to ensure adequate treatment. If you begin to experience abdominal pain, back pain, chest pain, nausea, vomiting, or diarrhea, please seek medical attention immediately.

## 2019-09-07 NOTE — PROGRESS NOTE ADULT - ATTENDING COMMENTS
57 yo M with HTN, asthma, pre-DM (HbA1c 6.1%), depression, and evidence of alcoholic steatosis on US, currently with a superimposed acute, predominantly-hepatocellular liver injury with mild associated hyperbilirubinemia, with preserved liver synthetic function. The liver injury appears to be gradually improving and his clinical symptoms are likewise improved today. Suspect possible idiosyncratic drug-induced liver injury (DILI) related to prior herbal/supplement use vs less likely lisinopril (especially as liver injury improved despite the latter medication initially being continued since admission, now discontinued today). Laboratory work-up ongoing to rule out other possible etiologies including viral causes. Despite significant alcohol history, do not suspect acute alcohol-related liver injury such as alcoholic hepatitis as this does not lead to liver enzyme elevations in the thousands. Elevated iron and ferritin as expected secondary to hepatocyte injury.

## 2019-09-07 NOTE — DISCHARGE NOTE PROVIDER - INSTRUCTIONS
Please refrain from taking any herbal or over the counter supplements including iron, turmeric, multivitamins, etc.

## 2019-09-07 NOTE — PROGRESS NOTE ADULT - SUBJECTIVE AND OBJECTIVE BOX
Interval Events:   He complains of wheezing and shortness of breath.    Allergies:  No Known Allergies      Hospital Medications:  ALBUTerol/ipratropium for Nebulization 3 milliLiter(s) Nebulizer every 6 hours PRN  buDESOnide 160 MICROgram(s)/formoterol 4.5 MICROgram(s) Inhaler 2 Puff(s) Inhalation two times a day  fenofibrate Tablet 145 milliGRAM(s) Oral daily  influenza   Vaccine 0.5 milliLiter(s) IntraMuscular once  lactated ringers. 1000 milliLiter(s) IV Continuous <Continuous>  lisinopril 5 milliGRAM(s) Oral daily  LORazepam     Tablet 2 milliGRAM(s) Oral every 2 hours PRN  melatonin 3 milliGRAM(s) Oral at bedtime  multivitamin 1 Tablet(s) Oral daily  ondansetron Injectable 4 milliGRAM(s) IV Push every 6 hours PRN  pantoprazole    Tablet 40 milliGRAM(s) Oral before breakfast  predniSONE   Tablet 40 milliGRAM(s) Oral daily  thiamine IVPB 500 milliGRAM(s) IV Intermittent every 8 hours      PMHX/PSHX:  Gastritis  Chronic GERD  Gout  ETOH abuse  Hypertension  Asthma  No significant past surgical history  Acute alcoholic intoxication without complication      ROS:   General:  No fevers, chills or night sweats  ENT:  No sore throat or dysphagia  CV:  No pain or palpitations  Resp:  + dyspnea and wheezing  GI:  as above  Skin:  No rash or edema    PHYSICAL EXAM:   Vital Signs:  Vital Signs Last 24 Hrs  T(C): 36.7 (07 Sep 2019 14:01), Max: 36.9 (07 Sep 2019 00:00)  T(F): 98 (07 Sep 2019 14:01), Max: 98.4 (07 Sep 2019 00:00)  HR: 66 (07 Sep 2019 14:01) (65 - 99)  BP: 116/69 (07 Sep 2019 14:01) (111/70 - 166/82)  BP(mean): --  RR: 15 (07 Sep 2019 14:01) (15 - 20)  SpO2: 97% (07 Sep 2019 14:01) (96% - 100%)  Daily     Daily     GENERAL:  Appears stated age, well-groomed, well-nourished, no distress  HEENT:  Conjunctivae clear and pink, sclera anicteric   CHEST:  + expiratory wheezes   HEART:  Regular rhythm, S1 and S2, no edema  ABDOMEN:  Soft, non-distended +obese abdomen, hepatomegaly   EXTREMITIES:  No LE edema  SKIN:  No rash, no spider angiomata, no palmar erythema   NEURO:  Alert, orientedx3    LABS:                        13.0   3.94  )-----------( 140      ( 07 Sep 2019 08:05 )             40.6     Mean Cell Volume: 92.5 fL (09-07-19 @ 08:05)    09-07    139  |  105  |  8   ----------------------------<  153<H>  3.9   |  22  |  0.83    Ca    8.2<L>      07 Sep 2019 01:13  Phos  2.9     09-07  Mg     1.7     09-07    TPro  6.5  /  Alb  3.8  /  TBili  1.1  /  DBili  x   /  AST  865<H>  /  ALT  683<H>  /  AlkPhos  64  09-07    LIVER FUNCTIONS - ( 07 Sep 2019 01:13 )  Alb: 3.8 g/dL / Pro: 6.5 g/dL / ALK PHOS: 64 u/L / ALT: 683 u/L / AST: 865 u/L / GGT: x           PT/INR - ( 06 Sep 2019 14:15 )   PT: 12.5 SEC;   INR: 1.12          PTT - ( 06 Sep 2019 14:15 )  PTT:30.8 SEC                            13.0   3.94  )-----------( 140      ( 07 Sep 2019 08:05 )             40.6                         12.9   4.01  )-----------( 148      ( 07 Sep 2019 01:13 )             39.4                         15.6   6.08  )-----------( 199      ( 06 Sep 2019 05:01 )             45.8       Imaging:

## 2019-09-07 NOTE — PROGRESS NOTE ADULT - PROBLEM SELECTOR PLAN 1
- initial AST 2400/ALT 1058, US showed hepatic steatosis without ascites or cirrhosis indicating an acute process likely alcoholic hepatitis and possible hepatocellular injury from OTC herbal supplements patient was taking PTA  - MELD Score 12, indicating <2% 90 day mortality rate- hepatology consulted, awaiting recommendations  - Maddrey Score 13.2- no need for prednisolone  - hepatitis panel negative   - f/u autoimmune hepatitis labs  - f/u serum DENA, anti-mitochondrial Ab, anti-smooth muscle Ab  - zofran PRN for associated nausea

## 2019-09-07 NOTE — PROGRESS NOTE ADULT - SUBJECTIVE AND OBJECTIVE BOX
Patient is a 58y old  Male who presents with a chief complaint of nausea, vomiting, elevated LFTs (06 Sep 2019 12:34)      SUBJECTIVE / OVERNIGHT EVENTS:  Patient was found to be wheezing overnight on exam. No intervention at that time.    Patient denies chest pain, SOB, palpitations, abdominal pain, nausea, vomiting, chills, and cough    MEDICATIONS  (STANDING):  buDESOnide 160 MICROgram(s)/formoterol 4.5 MICROgram(s) Inhaler 2 Puff(s) Inhalation two times a day  influenza   Vaccine 0.5 milliLiter(s) IntraMuscular once  lactated ringers. 1000 milliLiter(s) (100 mL/Hr) IV Continuous <Continuous>  lisinopril 5 milliGRAM(s) Oral daily  melatonin 3 milliGRAM(s) Oral at bedtime  multivitamin 1 Tablet(s) Oral daily  pantoprazole    Tablet 40 milliGRAM(s) Oral before breakfast  potassium acid phosphate/sodium acid phosphate tablet (K-PHOS No. 2) 1 Tablet(s) Oral four times a day with meals  thiamine IVPB 500 milliGRAM(s) IV Intermittent every 8 hours    MEDICATIONS  (PRN):  ALBUTerol/ipratropium for Nebulization 3 milliLiter(s) Nebulizer every 6 hours PRN Shortness of Breath and/or Wheezing  LORazepam     Tablet 2 milliGRAM(s) Oral every 2 hours PRN Symptom-triggered 2 point increase in CIWA-Ar  ondansetron Injectable 4 milliGRAM(s) IV Push every 6 hours PRN Nausea and/or Vomiting      T(F): 98.2 (09-07 @ 05:28), Max: 98.6 (09-06 @ 12:40)  HR: 67 (09-07 @ 06:00) (65 - 99)  BP: 125/72 (09-07 @ 06:00) (111/70 - 179/104)  RR: 16 (09-07 @ 06:00) (16 - 20)  SpO2: 98% (09-07 @ 06:00) (96% - 100%)  CAPILLARY BLOOD GLUCOSE        I&O's Summary      PHYSICAL EXAM:  GEN: Appears in no acute distress  HEENT: PERRLA, EOMI and accommodate, neck supple, no lymphadenopathy, no JVD  MOUTH: moist mucous membranes, no exudates or lesions   PULM: Clear to auscultation bilaterally, no wheezes, rales, rhonchi  CV: RRR, S1S2, no murmurs, rubs or gallops  Abdominal: Soft, nontender to palpation, non-distended, normoactive bowel sounds  Extremities: WWP, No edema, + peripheral pulses  NEURO: AAOx3, moving all four extremities spontaneously  Skin: No rashes, lesions, hematomas, ecchymosis      LABS:  Labs personally reviewed.                        12.9   4.01  )-----------( 148      ( 07 Sep 2019 01:13 )             39.4     Hgb Trend: 12.9<--, 15.6<--  09-07    139  |  105  |  8   ----------------------------<  153<H>  3.9   |  22  |  0.83    Ca    8.2<L>      07 Sep 2019 01:13  Phos  2.9     09-07  Mg     1.7     09-07    TPro  6.5  /  Alb  3.8  /  TBili  1.1  /  DBili  x   /  AST  865<H>  /  ALT  683<H>  /  AlkPhos  64  09-07    Creatinine Trend: 0.83<--, 0.84<--, 0.93<--, 0.92<--  PT/INR - ( 06 Sep 2019 14:15 )   PT: 12.5 SEC;   INR: 1.12          PTT - ( 06 Sep 2019 14:15 )  PTT:30.8 SEC    Hemoglobin A1C, Whole Blood (09.06.19 @ 14:15)    Hemoglobin A1C, Whole Blood: 6.1      Lipid Profile (09.06.19 @ 14:15)    Cholesterol, Serum: 138 mg/dL    Triglycerides, Serum: 638 mg/dL    HDL Cholesterol, Serum: 29 mg/dL    Direct LDL: 15    Toxicology Screen, Drugs of Abuse, Urine (09.06.19 @ 20:00)    Phencyclidine Level, Urine: NEGATIVE    Amphetamine, Urine: NEGATIVE    Barbiturates Screen, Urine: NEGATIVE    Benzodiazepine, Urine: POSITIVE    Cannabinoids, Urine: NEGATIVE    Cocaine Metabolite, Urine: NEGATIVE    Methadone, Urine: NEGATIVE    Opiate, Urine: NEGATIVE    Oxycodone, Urine: NEGATIVE    Acute Hepatitis Panel (09.06.19 @ 14:15)    Hepatitis C Virus Interpretation: Nonreactive    Hepatitis C Virus S/CO Ratio: 0.11 S/CO    Hepatitis B Core IgM Antibody: Nonreactive    Hepatitis B Surface Antigen: Nonreactive    Hepatitis A IgM Antibody: Nonreactive          Consultants:  Hepatology- f/u workup Patient is a 58y old  Male who presents with a chief complaint of nausea, vomiting, elevated LFTs (06 Sep 2019 12:34)      SUBJECTIVE / OVERNIGHT EVENTS:  Patient was found to be wheezing overnight on exam. No intervention at that time.    This morning continues to complain of wheezing, states it is triggered by his room being cold. Patient given extra blanket. Reports that the duonebs help momentarily with the wheezing but does not have lasting relief. Patient denies chest pain, SOB, palpitations, abdominal pain, nausea, vomiting, chills, and cough at this time.     MEDICATIONS  (STANDING):  buDESOnide 160 MICROgram(s)/formoterol 4.5 MICROgram(s) Inhaler 2 Puff(s) Inhalation two times a day  influenza   Vaccine 0.5 milliLiter(s) IntraMuscular once  lactated ringers. 1000 milliLiter(s) (100 mL/Hr) IV Continuous <Continuous>  lisinopril 5 milliGRAM(s) Oral daily  melatonin 3 milliGRAM(s) Oral at bedtime  multivitamin 1 Tablet(s) Oral daily  pantoprazole    Tablet 40 milliGRAM(s) Oral before breakfast  potassium acid phosphate/sodium acid phosphate tablet (K-PHOS No. 2) 1 Tablet(s) Oral four times a day with meals  thiamine IVPB 500 milliGRAM(s) IV Intermittent every 8 hours    MEDICATIONS  (PRN):  ALBUTerol/ipratropium for Nebulization 3 milliLiter(s) Nebulizer every 6 hours PRN Shortness of Breath and/or Wheezing  LORazepam     Tablet 2 milliGRAM(s) Oral every 2 hours PRN Symptom-triggered 2 point increase in CIWA-Ar  ondansetron Injectable 4 milliGRAM(s) IV Push every 6 hours PRN Nausea and/or Vomiting      T(F): 98.2 (09-07 @ 05:28), Max: 98.6 (09-06 @ 12:40)  HR: 67 (09-07 @ 06:00) (65 - 99)  BP: 125/72 (09-07 @ 06:00) (111/70 - 179/104)  RR: 16 (09-07 @ 06:00) (16 - 20)  SpO2: 98% (09-07 @ 06:00) (96% - 100%)  CAPILLARY BLOOD GLUCOSE        I&O's Summary      PHYSICAL EXAM:  GEN: Appears in no acute distress  HEENT: sclera clear, eyes tracking appropriately, neck supple, no lymphadenopathy, no JVD  MOUTH: moist mucous membranes, no exudates or lesions   PULM: diffuse wheezing vs. transmitted upper airway sounds, difficult to distinguish  CV: RRR, S1S2, no murmurs, rubs or gallops  Abdominal: Soft, nontender to palpation, non-distended, normoactive bowel sounds  Extremities: WWP, No edema, + peripheral pulses  NEURO: AAOx3, moving all four extremities spontaneously  Skin: No rashes, lesions, hematomas, ecchymosis      LABS:  Labs personally reviewed.                        12.9   4.01  )-----------( 148      ( 07 Sep 2019 01:13 )             39.4     Hgb Trend: 12.9<--, 15.6<--  09-07    139  |  105  |  8   ----------------------------<  153<H>  3.9   |  22  |  0.83    Ca    8.2<L>      07 Sep 2019 01:13  Phos  2.9     09-07  Mg     1.7     09-07    TPro  6.5  /  Alb  3.8  /  TBili  1.1  /  DBili  x   /  AST  865<H>  /  ALT  683<H>  /  AlkPhos  64  09-07    Creatinine Trend: 0.83<--, 0.84<--, 0.93<--, 0.92<--  PT/INR - ( 06 Sep 2019 14:15 )   PT: 12.5 SEC;   INR: 1.12          PTT - ( 06 Sep 2019 14:15 )  PTT:30.8 SEC    Hemoglobin A1C, Whole Blood (09.06.19 @ 14:15)    Hemoglobin A1C, Whole Blood: 6.1      Lipid Profile (09.06.19 @ 14:15)    Cholesterol, Serum: 138 mg/dL    Triglycerides, Serum: 638 mg/dL    HDL Cholesterol, Serum: 29 mg/dL    Direct LDL: 15    Toxicology Screen, Drugs of Abuse, Urine (09.06.19 @ 20:00)    Phencyclidine Level, Urine: NEGATIVE    Amphetamine, Urine: NEGATIVE    Barbiturates Screen, Urine: NEGATIVE    Benzodiazepine, Urine: POSITIVE    Cannabinoids, Urine: NEGATIVE    Cocaine Metabolite, Urine: NEGATIVE    Methadone, Urine: NEGATIVE    Opiate, Urine: NEGATIVE    Oxycodone, Urine: NEGATIVE    Acute Hepatitis Panel (09.06.19 @ 14:15)    Hepatitis C Virus Interpretation: Nonreactive    Hepatitis C Virus S/CO Ratio: 0.11 S/CO    Hepatitis B Core IgM Antibody: Nonreactive    Hepatitis B Surface Antigen: Nonreactive    Hepatitis A IgM Antibody: Nonreactive          Consultants:  Hepatology- f/u workup

## 2019-09-07 NOTE — PROGRESS NOTE ADULT - PROBLEM SELECTOR PLAN 3
- /104 in the ED  - given hydralazine 5mg x1  - will c/w lisinopril 5mg (home dose) given no JAVIER - Triglycerides 638  - start fenofibrate 145mg daily   - dietitian consult

## 2019-09-08 LAB
ALBUMIN SERPL ELPH-MCNC: 4 G/DL — SIGNIFICANT CHANGE UP (ref 3.3–5)
ALP SERPL-CCNC: 74 U/L — SIGNIFICANT CHANGE UP (ref 40–120)
ALT FLD-CCNC: 403 U/L — HIGH (ref 4–41)
ANA TITR SER: NEGATIVE — SIGNIFICANT CHANGE UP
ANION GAP SERPL CALC-SCNC: 14 MMO/L — SIGNIFICANT CHANGE UP (ref 7–14)
APTT BLD: 31.3 SEC — SIGNIFICANT CHANGE UP (ref 27.5–36.3)
AST SERPL-CCNC: 199 U/L — HIGH (ref 4–40)
BILIRUB SERPL-MCNC: 0.6 MG/DL — SIGNIFICANT CHANGE UP (ref 0.2–1.2)
BUN SERPL-MCNC: 16 MG/DL — SIGNIFICANT CHANGE UP (ref 7–23)
CALCIUM SERPL-MCNC: 9.5 MG/DL — SIGNIFICANT CHANGE UP (ref 8.4–10.5)
CHLORIDE SERPL-SCNC: 104 MMOL/L — SIGNIFICANT CHANGE UP (ref 98–107)
CMV IGG FLD QL: 3 U/ML — HIGH
CMV IGG SERPL-IMP: POSITIVE — SIGNIFICANT CHANGE UP
CMV IGM FLD-ACNC: 39.3 AU/ML — HIGH
CMV IGM SERPL QL: POSITIVE — SIGNIFICANT CHANGE UP
CO2 SERPL-SCNC: 19 MMOL/L — LOW (ref 22–31)
CREAT SERPL-MCNC: 0.85 MG/DL — SIGNIFICANT CHANGE UP (ref 0.5–1.3)
EBV EA AB TITR SER IF: POSITIVE — SIGNIFICANT CHANGE UP
EBV EA IGG SER-ACNC: NEGATIVE — SIGNIFICANT CHANGE UP
EBV PATRN SPEC IB-IMP: SIGNIFICANT CHANGE UP
EBV VCA IGG AVIDITY SER QL IA: POSITIVE — SIGNIFICANT CHANGE UP
EBV VCA IGM TITR FLD: NEGATIVE — SIGNIFICANT CHANGE UP
GLUCOSE SERPL-MCNC: 215 MG/DL — HIGH (ref 70–99)
IGA FLD-MCNC: 472 MG/DL — HIGH (ref 70–400)
IGG FLD-MCNC: 1112 MG/DL — SIGNIFICANT CHANGE UP (ref 700–1600)
IGM SERPL-MCNC: 166 MG/DL — SIGNIFICANT CHANGE UP (ref 40–230)
INR BLD: 0.98 — SIGNIFICANT CHANGE UP (ref 0.88–1.17)
MAGNESIUM SERPL-MCNC: 1.8 MG/DL — SIGNIFICANT CHANGE UP (ref 1.6–2.6)
MITOCHONDRIA AB SER-ACNC: SIGNIFICANT CHANGE UP
PHOSPHATE SERPL-MCNC: 1.8 MG/DL — LOW (ref 2.5–4.5)
POTASSIUM SERPL-MCNC: 4.7 MMOL/L — SIGNIFICANT CHANGE UP (ref 3.5–5.3)
POTASSIUM SERPL-SCNC: 4.7 MMOL/L — SIGNIFICANT CHANGE UP (ref 3.5–5.3)
PROT SERPL-MCNC: 7.5 G/DL — SIGNIFICANT CHANGE UP (ref 6–8.3)
PROTHROM AB SERPL-ACNC: 11.2 SEC — SIGNIFICANT CHANGE UP (ref 9.8–13.1)
SMOOTH MUSCLE AB SER-ACNC: SIGNIFICANT CHANGE UP
SODIUM SERPL-SCNC: 137 MMOL/L — SIGNIFICANT CHANGE UP (ref 135–145)

## 2019-09-08 PROCEDURE — 99232 SBSQ HOSP IP/OBS MODERATE 35: CPT | Mod: GC

## 2019-09-08 PROCEDURE — 99233 SBSQ HOSP IP/OBS HIGH 50: CPT | Mod: GC

## 2019-09-08 RX ADMIN — Medication 105 MILLIGRAM(S): at 21:41

## 2019-09-08 RX ADMIN — Medication 20 MILLIGRAM(S): at 21:47

## 2019-09-08 RX ADMIN — Medication 3 MILLIGRAM(S): at 21:41

## 2019-09-08 RX ADMIN — Medication 40 MILLIGRAM(S): at 06:11

## 2019-09-08 RX ADMIN — Medication 3 MILLILITER(S): at 04:21

## 2019-09-08 RX ADMIN — Medication 3 MILLILITER(S): at 10:48

## 2019-09-08 RX ADMIN — Medication 145 MILLIGRAM(S): at 13:38

## 2019-09-08 RX ADMIN — BUDESONIDE AND FORMOTEROL FUMARATE DIHYDRATE 2 PUFF(S): 160; 4.5 AEROSOL RESPIRATORY (INHALATION) at 10:00

## 2019-09-08 RX ADMIN — Medication 1 TABLET(S): at 13:38

## 2019-09-08 RX ADMIN — Medication 105 MILLIGRAM(S): at 06:11

## 2019-09-08 RX ADMIN — Medication 105 MILLIGRAM(S): at 14:10

## 2019-09-08 RX ADMIN — AMLODIPINE BESYLATE 5 MILLIGRAM(S): 2.5 TABLET ORAL at 06:11

## 2019-09-08 RX ADMIN — Medication 20 MILLIGRAM(S): at 13:35

## 2019-09-08 RX ADMIN — Medication 3 MILLILITER(S): at 21:13

## 2019-09-08 RX ADMIN — BUDESONIDE AND FORMOTEROL FUMARATE DIHYDRATE 2 PUFF(S): 160; 4.5 AEROSOL RESPIRATORY (INHALATION) at 21:41

## 2019-09-08 RX ADMIN — PANTOPRAZOLE SODIUM 40 MILLIGRAM(S): 20 TABLET, DELAYED RELEASE ORAL at 06:11

## 2019-09-08 NOTE — PROGRESS NOTE ADULT - PROBLEM SELECTOR PLAN 1
- initial AST 2400/ALT 1058, US showed hepatic steatosis without ascites or cirrhosis indicating an acute process likely alcoholic hepatitis and possible hepatocellular injury from OTC herbal supplements patient was taking PTA  - MELD Score 12, indicating <2% 90 day mortality rate- hepatology consulted, awaiting recommendations  - Maddrey Score 13.2- no need for prednisolone  - hepatitis panel negative   - f/u autoimmune hepatitis labs  - f/u serum DENA, anti-mitochondrial Ab, anti-smooth muscle Ab  - zofran PRN for associated nausea  - LFTs downtrending. INR now WNL. MRI abd pending (protocoled) - initial AST 2400/ALT 1058, US showed hepatic steatosis without ascites or cirrhosis indicating an acute process likely alcoholic hepatitis and possible hepatocellular injury from OTC herbal supplements patient was taking PTA  - MELD Score 12, indicating <2% 90 day mortality rate- hepatology consulted, awaiting recommendations  - Maddrey Score 13.2- no need for prednisolone  - hepatitis panel negative   - f/u autoimmune hepatitis labs  - f/u serum DENA, anti-mitochondrial Ab, anti-smooth muscle Ab  - zofran PRN for associated nausea  - LFTs downtrending. INR now WNL. Cancelled MRI per hepatology

## 2019-09-08 NOTE — PROGRESS NOTE ADULT - PROBLEM SELECTOR PLAN 6
- pt with mild diffuse wheezing on exam  - c/w prednisone 40 qd for 5 days. C/w Symbicort and duonebs PRN

## 2019-09-08 NOTE — PROGRESS NOTE ADULT - SUBJECTIVE AND OBJECTIVE BOX
Interval Events:   Feels better  No new complaints     Allergies:  No Known Allergies      Hospital Medications:  ALBUTerol/ipratropium for Nebulization 3 milliLiter(s) Nebulizer every 6 hours  amLODIPine   Tablet 5 milliGRAM(s) Oral daily  buDESOnide 160 MICROgram(s)/formoterol 4.5 MICROgram(s) Inhaler 2 Puff(s) Inhalation two times a day  fenofibrate Tablet 145 milliGRAM(s) Oral daily  influenza   Vaccine 0.5 milliLiter(s) IntraMuscular once  lactated ringers. 1000 milliLiter(s) IV Continuous <Continuous>  melatonin 3 milliGRAM(s) Oral at bedtime  methylPREDNISolone sodium succinate Injectable 20 milliGRAM(s) IV Push every 8 hours  multivitamin 1 Tablet(s) Oral daily  ondansetron Injectable 4 milliGRAM(s) IV Push every 6 hours PRN  pantoprazole    Tablet 40 milliGRAM(s) Oral before breakfast  thiamine IVPB 500 milliGRAM(s) IV Intermittent every 8 hours      PMHX/PSHX:  Gastritis  Chronic GERD  Gout  ETOH abuse  Hypertension  Asthma  No significant past surgical history  Acute alcoholic intoxication without complication      ROS:   ROS:   General:  No fevers, chills or night sweats  ENT:  No sore throat or dysphagia  CV:  No pain or palpitations  Resp:  + dyspnea and wheezing  GI:  as above  Skin:  No rash or edema    PHYSICAL EXAM:   Vital Signs:  Vital Signs Last 24 Hrs  T(C): 37 (08 Sep 2019 10:00), Max: 37 (08 Sep 2019 10:00)  T(F): 98.6 (08 Sep 2019 10:00), Max: 98.6 (08 Sep 2019 10:00)  HR: 72 (08 Sep 2019 10:48) (58 - 80)  BP: 127/79 (08 Sep 2019 10:00) (120/68 - 132/84)  BP(mean): --  RR: 18 (08 Sep 2019 10:00) (16 - 18)  SpO2: 97% (08 Sep 2019 10:48) (97% - 100%)  Daily     Daily     GENERAL:  Appears stated age, well-groomed, well-nourished, no distress  HEENT:  Conjunctivae clear and pink, sclera anicteric   CHEST:  + expiratory wheezes   HEART:  Regular rhythm, S1 and S2, no edema  ABDOMEN:  Soft, non-distended +obese abdomen, hepatomegaly   EXTREMITIES:  No LE edema  SKIN:  No rash, no spider angiomata, no palmar erythema   NEURO:  Alert, orientedx3    LABS:                        13.0   3.94  )-----------( 140      ( 07 Sep 2019 08:05 )             40.6       09-07    139  |  105  |  8   ----------------------------<  153<H>  3.9   |  22  |  0.83    Ca    8.2<L>      07 Sep 2019 01:13  Phos  2.9     09-07  Mg     1.7     09-07    TPro  6.5  /  Alb  3.8  /  TBili  1.1  /  DBili  x   /  AST  865<H>  /  ALT  683<H>  /  AlkPhos  64  09-07    LIVER FUNCTIONS - ( 07 Sep 2019 01:13 )  Alb: 3.8 g/dL / Pro: 6.5 g/dL / ALK PHOS: 64 u/L / ALT: 683 u/L / AST: 865 u/L / GGT: x                                       13.0   3.94  )-----------( 140      ( 07 Sep 2019 08:05 )             40.6                         12.9   4.01  )-----------( 148      ( 07 Sep 2019 01:13 )             39.4                         15.6   6.08  )-----------( 199      ( 06 Sep 2019 05:01 )             45.8       Imaging:

## 2019-09-08 NOTE — PROGRESS NOTE ADULT - PROBLEM SELECTOR PLAN 2
- ferritin > 1900. iron 239  - MRI liver to assess iron stores - ferritin > 1900. iron 239  - no need for further work up per hepatology

## 2019-09-08 NOTE — PROGRESS NOTE ADULT - ASSESSMENT
58 year old man with history of alcohol abuse, hepatic steatosis, esophagitis/gastritis/duodenitis (EGD 09/2016), HTN, asthma and depression who presents for nausea and vomiting in setting of increased alcohol use found to have severely elevated liver enzymes with intact hepatic synthetic function.      # Hepatocellular liver injury: AST and ALT >15x upper limit of normal.  likely Drug- induced liver injury (DILI - He was taking herbal meds at home) vs AIH, DD includes acute viral hepatitis ( E, HSV, EBV, CMV, C)  Unlikely alcoholic hepatitis giving his ALT and AST in 1000s range.  # Hypertension: BP currently uncontrolled. On lisinopril.   # Acute Asthma exacerbation.    Recommendations -   - No labs were sent today   - send CMV PCR.  - follow the results of HCV RNA PCR, HSV IgM and PCR  - follow up Hep E IgM, CMV and EBV serlogy result  - follow up serum DENA, anti-mitochondrial Ab, anti-smooth muscle Ab  - trend CMP, INR and platelets daily  - continue treatment for asthma exacerbation

## 2019-09-08 NOTE — PROGRESS NOTE ADULT - ASSESSMENT
59yo M with hx of HTN, ETOH abuse, gastritis, and GERD who presented with 3 days of nausea, vomiting, and abdominal pain found to have transaminitis possibly 2/2 acute hepatitis vs. ethanol vs. drug induced liver injury.

## 2019-09-08 NOTE — PROGRESS NOTE ADULT - PROBLEM SELECTOR PLAN 4
c/b ETOH gastritis/duodenitis: c/w protonix 40mg qd  - thiamine 500mg TID x 5 days, currently not withdrawing  - symptom triggered CIWA  - Stox negative, Utox pending  -  consult for alcohol cessation

## 2019-09-08 NOTE — PROGRESS NOTE ADULT - SUBJECTIVE AND OBJECTIVE BOX
Patient is a 58y old  Male who presents with a chief complaint of nausea, vomiting, elevated LFTs (07 Sep 2019 14:36)      SUBJECTIVE / OVERNIGHT EVENTS:    Patient seen and examined at bedside. No acute events overnight. Called and protocoled MRI. Complained of mild wheezing ON received steroids and nebs w improvement. Discussed that LFTs were downtrending and INR WNL.     T(F): 97.8 (09-08 @ 05:58), Max: 98.2 (09-07 @ 10:00)  HR: 68 (09-08 @ 05:58) (58 - 80)  BP: 120/68 (09-08 @ 05:58) (116/69 - 155/95)  RR: 18 (09-08 @ 05:58) (15 - 18)  SpO2: 99% (09-08 @ 05:58) (97% - 100%)    I&O's Summary    07 Sep 2019 07:01  -  08 Sep 2019 07:00  --------------------------------------------------------  IN: 1800 mL / OUT: 3250 mL / NET: -1450 mL        MEDICATIONS  (STANDING):  ALBUTerol/ipratropium for Nebulization 3 milliLiter(s) Nebulizer every 6 hours  amLODIPine   Tablet 5 milliGRAM(s) Oral daily  buDESOnide 160 MICROgram(s)/formoterol 4.5 MICROgram(s) Inhaler 2 Puff(s) Inhalation two times a day  fenofibrate Tablet 145 milliGRAM(s) Oral daily  influenza   Vaccine 0.5 milliLiter(s) IntraMuscular once  lactated ringers. 1000 milliLiter(s) (100 mL/Hr) IV Continuous <Continuous>  melatonin 3 milliGRAM(s) Oral at bedtime  multivitamin 1 Tablet(s) Oral daily  pantoprazole    Tablet 40 milliGRAM(s) Oral before breakfast  predniSONE   Tablet 40 milliGRAM(s) Oral daily  thiamine IVPB 500 milliGRAM(s) IV Intermittent every 8 hours    MEDICATIONS  (PRN):  ondansetron Injectable 4 milliGRAM(s) IV Push every 6 hours PRN Nausea and/or Vomiting        PHYSICAL EXAM:  GEN: Age appropriate, resting comfortably in bed, no acute distress, non toxic appearing, speaking in complete sentences.   HEENT: Conjunctiva and sclera normal  PULM: mild bilateral exp wheezes, no rales, rhonchi  CV: RRR, S1S2, no MRG  Abdominal: Soft, nontender to palpation, non-distended, normoactive bowel sounds  Extremities: No edema or cyanosis  NEURO: Alert and oriented. Following commands  Skin: No rashes, lesions      LABS:  Labs personally reviewed.                        13.0   3.94  )-----------( 140      ( 07 Sep 2019 08:05 )             40.6     Hgb Trend: 13.0<--, 12.9<--, 15.6<--  09-07    139  |  105  |  8   ----------------------------<  153<H>  3.9   |  22  |  0.83    Ca    8.2<L>      07 Sep 2019 01:13  Phos  2.9     09-07  Mg     1.7     09-07    TPro  6.5  /  Alb  3.8  /  TBili  1.1  /  DBili  x   /  AST  865<H>  /  ALT  683<H>  /  AlkPhos  64  09-07    Creatinine Trend: 0.83<--, 0.84<--, 0.93<--, 0.92<--  PT/INR - ( 06 Sep 2019 14:15 )   PT: 12.5 SEC;   INR: 1.12          PTT - ( 06 Sep 2019 14:15 )  PTT:30.8 SEC    [facility]  PGY-3 Pager: [phone]  [phone]  Night Float: Patient is a 58y old  Male who presents with a chief complaint of nausea, vomiting, elevated LFTs (07 Sep 2019 14:36)      SUBJECTIVE / OVERNIGHT EVENTS:    Patient seen and examined at bedside. No acute events overnight. Complained of mild wheezing ON received steroids and nebs w improvement. Discussed that LFTs were downtrending and INR WNL.     T(F): 97.8 (09-08 @ 05:58), Max: 98.2 (09-07 @ 10:00)  HR: 68 (09-08 @ 05:58) (58 - 80)  BP: 120/68 (09-08 @ 05:58) (116/69 - 155/95)  RR: 18 (09-08 @ 05:58) (15 - 18)  SpO2: 99% (09-08 @ 05:58) (97% - 100%)    I&O's Summary    07 Sep 2019 07:01  -  08 Sep 2019 07:00  --------------------------------------------------------  IN: 1800 mL / OUT: 3250 mL / NET: -1450 mL        MEDICATIONS  (STANDING):  ALBUTerol/ipratropium for Nebulization 3 milliLiter(s) Nebulizer every 6 hours  amLODIPine   Tablet 5 milliGRAM(s) Oral daily  buDESOnide 160 MICROgram(s)/formoterol 4.5 MICROgram(s) Inhaler 2 Puff(s) Inhalation two times a day  fenofibrate Tablet 145 milliGRAM(s) Oral daily  influenza   Vaccine 0.5 milliLiter(s) IntraMuscular once  lactated ringers. 1000 milliLiter(s) (100 mL/Hr) IV Continuous <Continuous>  melatonin 3 milliGRAM(s) Oral at bedtime  multivitamin 1 Tablet(s) Oral daily  pantoprazole    Tablet 40 milliGRAM(s) Oral before breakfast  predniSONE   Tablet 40 milliGRAM(s) Oral daily  thiamine IVPB 500 milliGRAM(s) IV Intermittent every 8 hours    MEDICATIONS  (PRN):  ondansetron Injectable 4 milliGRAM(s) IV Push every 6 hours PRN Nausea and/or Vomiting        PHYSICAL EXAM:  GEN: Age appropriate, resting comfortably in bed, no acute distress, non toxic appearing, speaking in complete sentences.   HEENT: Conjunctiva and sclera normal  PULM: mild bilateral exp wheezes, no rales, rhonchi  CV: RRR, S1S2, no MRG  Abdominal: Soft, nontender to palpation, non-distended, normoactive bowel sounds  Extremities: No edema or cyanosis  NEURO: Alert and oriented. Following commands  Skin: No rashes, lesions      LABS:  Labs personally reviewed.                        13.0   3.94  )-----------( 140      ( 07 Sep 2019 08:05 )             40.6     Hgb Trend: 13.0<--, 12.9<--, 15.6<--  09-07    139  |  105  |  8   ----------------------------<  153<H>  3.9   |  22  |  0.83    Ca    8.2<L>      07 Sep 2019 01:13  Phos  2.9     09-07  Mg     1.7     09-07    TPro  6.5  /  Alb  3.8  /  TBili  1.1  /  DBili  x   /  AST  865<H>  /  ALT  683<H>  /  AlkPhos  64  09-07    Creatinine Trend: 0.83<--, 0.84<--, 0.93<--, 0.92<--  PT/INR - ( 06 Sep 2019 14:15 )   PT: 12.5 SEC;   INR: 1.12          PTT - ( 06 Sep 2019 14:15 )  PTT:30.8 SEC    [facility]  PGY-3 Pager: [phone]  [phone]  Night Float:

## 2019-09-08 NOTE — PROGRESS NOTE ADULT - ATTENDING COMMENTS
Patient was seen and examined personally by me. I have discussed the plan and reviewed the resident's note and agree with the above physical exam findings including assessment and plan except as indicated below.    LFTs downtrending but AM labs pending. Acute hepatitis panel negative.   Elevated ferritin but expected with hepatocellular injury per hepatology. No need for MRI liver  f/u DENA, anti-mitochondrial Ab, anti-smooth muscle Ab  Started fenofibrate for isolated hypertriglyceridemia. No prior hx of pancreatitis. Dietary modification discussed. Repeat lipid panel in 1 month  Continues to have wheezing on nebs and symbicort. c/w ATC nebs, start solumedrol 20 q8, incentive spirometry. CXR negative  CMW IGM and IGG positive, unclear significance. Check CMV PCR

## 2019-09-09 ENCOUNTER — TRANSCRIPTION ENCOUNTER (OUTPATIENT)
Age: 59
End: 2019-09-09

## 2019-09-09 VITALS
OXYGEN SATURATION: 100 % | TEMPERATURE: 98 F | RESPIRATION RATE: 18 BRPM | DIASTOLIC BLOOD PRESSURE: 65 MMHG | SYSTOLIC BLOOD PRESSURE: 102 MMHG | HEART RATE: 78 BPM

## 2019-09-09 DIAGNOSIS — D72.829 ELEVATED WHITE BLOOD CELL COUNT, UNSPECIFIED: ICD-10-CM

## 2019-09-09 DIAGNOSIS — Z71.89 OTHER SPECIFIED COUNSELING: ICD-10-CM

## 2019-09-09 LAB
ALBUMIN SERPL ELPH-MCNC: 4.2 G/DL — SIGNIFICANT CHANGE UP (ref 3.3–5)
ALP SERPL-CCNC: 67 U/L — SIGNIFICANT CHANGE UP (ref 40–120)
ALT FLD-CCNC: 329 U/L — HIGH (ref 4–41)
ANION GAP SERPL CALC-SCNC: 15 MMO/L — HIGH (ref 7–14)
APTT BLD: 31.5 SEC — SIGNIFICANT CHANGE UP (ref 27.5–36.3)
AST SERPL-CCNC: 118 U/L — HIGH (ref 4–40)
BASOPHILS # BLD AUTO: 0.03 K/UL — SIGNIFICANT CHANGE UP (ref 0–0.2)
BASOPHILS NFR BLD AUTO: 0.2 % — SIGNIFICANT CHANGE UP (ref 0–2)
BILIRUB SERPL-MCNC: 0.5 MG/DL — SIGNIFICANT CHANGE UP (ref 0.2–1.2)
BUN SERPL-MCNC: 15 MG/DL — SIGNIFICANT CHANGE UP (ref 7–23)
CALCIUM SERPL-MCNC: 9.3 MG/DL — SIGNIFICANT CHANGE UP (ref 8.4–10.5)
CHLORIDE SERPL-SCNC: 101 MMOL/L — SIGNIFICANT CHANGE UP (ref 98–107)
CO2 SERPL-SCNC: 23 MMOL/L — SIGNIFICANT CHANGE UP (ref 22–31)
CREAT SERPL-MCNC: 0.73 MG/DL — SIGNIFICANT CHANGE UP (ref 0.5–1.3)
EOSINOPHIL # BLD AUTO: 0 K/UL — SIGNIFICANT CHANGE UP (ref 0–0.5)
EOSINOPHIL NFR BLD AUTO: 0 % — SIGNIFICANT CHANGE UP (ref 0–6)
GLUCOSE SERPL-MCNC: 227 MG/DL — HIGH (ref 70–99)
HCT VFR BLD CALC: 41.8 % — SIGNIFICANT CHANGE UP (ref 39–50)
HCV AB S/CO SERPL IA: 0.11 S/CO — SIGNIFICANT CHANGE UP (ref 0–0.99)
HCV AB SERPL-IMP: SIGNIFICANT CHANGE UP
HCV RNA SERPL NAA DL=5-ACNC: NOT DETECTED IU/ML — SIGNIFICANT CHANGE UP
HCV RNA SPEC NAA+PROBE-LOG IU: SIGNIFICANT CHANGE UP LOGIU/ML
HGB BLD-MCNC: 13.4 G/DL — SIGNIFICANT CHANGE UP (ref 13–17)
HSV1 IGG SER-ACNC: 35.6 INDEX — HIGH
HSV1 IGG SERPL QL IA: POSITIVE — SIGNIFICANT CHANGE UP
HSV2 IGG FLD-ACNC: 0.16 INDEX — SIGNIFICANT CHANGE UP
HSV2 IGG SERPL QL IA: NEGATIVE — SIGNIFICANT CHANGE UP
IMM GRANULOCYTES NFR BLD AUTO: 0.9 % — SIGNIFICANT CHANGE UP (ref 0–1.5)
INR BLD: 1.01 — SIGNIFICANT CHANGE UP (ref 0.88–1.17)
LYMPHOCYTES # BLD AUTO: 1.29 K/UL — SIGNIFICANT CHANGE UP (ref 1–3.3)
LYMPHOCYTES # BLD AUTO: 10.6 % — LOW (ref 13–44)
MAGNESIUM SERPL-MCNC: 1.9 MG/DL — SIGNIFICANT CHANGE UP (ref 1.6–2.6)
MCHC RBC-ENTMCNC: 29.3 PG — SIGNIFICANT CHANGE UP (ref 27–34)
MCHC RBC-ENTMCNC: 32.1 % — SIGNIFICANT CHANGE UP (ref 32–36)
MCV RBC AUTO: 91.5 FL — SIGNIFICANT CHANGE UP (ref 80–100)
MONOCYTES # BLD AUTO: 0.7 K/UL — SIGNIFICANT CHANGE UP (ref 0–0.9)
MONOCYTES NFR BLD AUTO: 5.8 % — SIGNIFICANT CHANGE UP (ref 2–14)
NEUTROPHILS # BLD AUTO: 10.01 K/UL — HIGH (ref 1.8–7.4)
NEUTROPHILS NFR BLD AUTO: 82.5 % — HIGH (ref 43–77)
NRBC # FLD: 0.02 K/UL — SIGNIFICANT CHANGE UP (ref 0–0)
PHOSPHATE SERPL-MCNC: 2.6 MG/DL — SIGNIFICANT CHANGE UP (ref 2.5–4.5)
PLATELET # BLD AUTO: 150 K/UL — SIGNIFICANT CHANGE UP (ref 150–400)
PMV BLD: 11.2 FL — SIGNIFICANT CHANGE UP (ref 7–13)
POTASSIUM SERPL-MCNC: 4.3 MMOL/L — SIGNIFICANT CHANGE UP (ref 3.5–5.3)
POTASSIUM SERPL-SCNC: 4.3 MMOL/L — SIGNIFICANT CHANGE UP (ref 3.5–5.3)
PROT SERPL-MCNC: 7.4 G/DL — SIGNIFICANT CHANGE UP (ref 6–8.3)
PROTHROM AB SERPL-ACNC: 11.5 SEC — SIGNIFICANT CHANGE UP (ref 9.8–13.1)
RBC # BLD: 4.57 M/UL — SIGNIFICANT CHANGE UP (ref 4.2–5.8)
RBC # FLD: 12.4 % — SIGNIFICANT CHANGE UP (ref 10.3–14.5)
SODIUM SERPL-SCNC: 139 MMOL/L — SIGNIFICANT CHANGE UP (ref 135–145)
WBC # BLD: 12.14 K/UL — HIGH (ref 3.8–10.5)
WBC # FLD AUTO: 12.14 K/UL — HIGH (ref 3.8–10.5)

## 2019-09-09 PROCEDURE — 99239 HOSP IP/OBS DSCHRG MGMT >30: CPT | Mod: GC

## 2019-09-09 PROCEDURE — 99232 SBSQ HOSP IP/OBS MODERATE 35: CPT | Mod: GC

## 2019-09-09 RX ORDER — FLUTICASONE PROPIONATE AND SALMETEROL 50; 250 UG/1; UG/1
2 POWDER ORAL; RESPIRATORY (INHALATION)
Qty: 3 | Refills: 0
Start: 2019-09-09

## 2019-09-09 RX ORDER — FENOFIBRATE,MICRONIZED 130 MG
1 CAPSULE ORAL
Qty: 30 | Refills: 0
Start: 2019-09-09

## 2019-09-09 RX ORDER — IBUPROFEN 200 MG
400 TABLET ORAL ONCE
Refills: 0 | Status: COMPLETED | OUTPATIENT
Start: 2019-09-09 | End: 2019-09-09

## 2019-09-09 RX ORDER — PANTOPRAZOLE SODIUM 20 MG/1
1 TABLET, DELAYED RELEASE ORAL
Qty: 7 | Refills: 0
Start: 2019-09-09 | End: 2019-09-15

## 2019-09-09 RX ORDER — ALBUTEROL 90 UG/1
2 AEROSOL, METERED ORAL
Qty: 3 | Refills: 0
Start: 2019-09-09

## 2019-09-09 RX ORDER — MAGNESIUM SULFATE 500 MG/ML
2 VIAL (ML) INJECTION ONCE
Refills: 0 | Status: COMPLETED | OUTPATIENT
Start: 2019-09-09 | End: 2019-09-09

## 2019-09-09 RX ORDER — AMLODIPINE BESYLATE 2.5 MG/1
1 TABLET ORAL
Qty: 30 | Refills: 0
Start: 2019-09-09 | End: 2019-10-08

## 2019-09-09 RX ADMIN — Medication 105 MILLIGRAM(S): at 17:09

## 2019-09-09 RX ADMIN — Medication 20 MILLIGRAM(S): at 14:34

## 2019-09-09 RX ADMIN — Medication 400 MILLIGRAM(S): at 09:30

## 2019-09-09 RX ADMIN — PANTOPRAZOLE SODIUM 40 MILLIGRAM(S): 20 TABLET, DELAYED RELEASE ORAL at 06:54

## 2019-09-09 RX ADMIN — Medication 400 MILLIGRAM(S): at 09:00

## 2019-09-09 RX ADMIN — AMLODIPINE BESYLATE 5 MILLIGRAM(S): 2.5 TABLET ORAL at 05:30

## 2019-09-09 RX ADMIN — Medication 145 MILLIGRAM(S): at 12:00

## 2019-09-09 RX ADMIN — Medication 50 GRAM(S): at 15:30

## 2019-09-09 RX ADMIN — BUDESONIDE AND FORMOTEROL FUMARATE DIHYDRATE 2 PUFF(S): 160; 4.5 AEROSOL RESPIRATORY (INHALATION) at 09:30

## 2019-09-09 RX ADMIN — Medication 105 MILLIGRAM(S): at 05:30

## 2019-09-09 RX ADMIN — Medication 20 MILLIGRAM(S): at 05:30

## 2019-09-09 RX ADMIN — Medication 3 MILLILITER(S): at 03:28

## 2019-09-09 RX ADMIN — Medication 1 TABLET(S): at 12:00

## 2019-09-09 NOTE — PROGRESS NOTE ADULT - PROBLEM SELECTOR PLAN 1
- initial AST 2400/ALT 1058, US showed hepatic steatosis without ascites or cirrhosis indicating an acute process likely alcoholic hepatitis and possible hepatocellular injury from OTC herbal supplements patient was taking PTA  - MELD Score 12, indicating <2% 90 day mortality rate- hepatology consulted, awaiting recommendations  - Maddrey Score 13.2- no need for prednisolone  - hepatitis panel negative   - serum DENA, anti-mitochondrial Ab, anti-smooth muscle Ab WNL  - zofran PRN for associated nausea  - LFTs downtrending. INR now WNL. Cancelled MRI per hepatology

## 2019-09-09 NOTE — PROGRESS NOTE ADULT - SUBJECTIVE AND OBJECTIVE BOX
Patient is a 58y old  Male who presents with a chief complaint of nausea, vomiting, elevated LFTs (08 Sep 2019 15:00)      SUBJECTIVE / OVERNIGHT EVENTS:  No acute overnight events.    Patient encountered resting comfortably in bed. Patient complaining of nonspecific polyarthralgias. Written for ibuprofen 400mg, will reassess. Patient denies chest pain, SOB, palpitations, abdominal pain, nausea, vomiting, chills, and cough. Tolerating regular diet well.     MEDICATIONS  (STANDING):  ALBUTerol/ipratropium for Nebulization 3 milliLiter(s) Nebulizer every 6 hours  amLODIPine   Tablet 5 milliGRAM(s) Oral daily  buDESOnide 160 MICROgram(s)/formoterol 4.5 MICROgram(s) Inhaler 2 Puff(s) Inhalation two times a day  fenofibrate Tablet 145 milliGRAM(s) Oral daily  ibuprofen  Tablet. 400 milliGRAM(s) Oral once  influenza   Vaccine 0.5 milliLiter(s) IntraMuscular once  lactated ringers. 1000 milliLiter(s) (100 mL/Hr) IV Continuous <Continuous>  melatonin 3 milliGRAM(s) Oral at bedtime  methylPREDNISolone sodium succinate Injectable 20 milliGRAM(s) IV Push every 8 hours  multivitamin 1 Tablet(s) Oral daily  pantoprazole    Tablet 40 milliGRAM(s) Oral before breakfast  thiamine IVPB 500 milliGRAM(s) IV Intermittent every 8 hours    MEDICATIONS  (PRN):  ondansetron Injectable 4 milliGRAM(s) IV Push every 6 hours PRN Nausea and/or Vomiting      T(F): 97.8 (09-09 @ 05:27), Max: 98.6 (09-08 @ 10:00)  HR: 92 (09-09 @ 05:27) (64 - 92)  BP: 135/86 (09-09 @ 05:27) (112/70 - 135/86)  RR: 17 (09-09 @ 05:27) (17 - 18)  SpO2: 99% (09-09 @ 05:27) (97% - 99%)  CAPILLARY BLOOD GLUCOSE        I&O's Summary    08 Sep 2019 07:01  -  09 Sep 2019 07:00  --------------------------------------------------------  IN: 600 mL / OUT: 1200 mL / NET: -600 mL        PHYSICAL EXAM:  GEN: Appears in no acute distress  HEENT: PERRLA, EOMI and accommodate, neck supple, no lymphadenopathy, no JVD  MOUTH: moist mucous membranes, no exudates or lesions   PULM: Clear to auscultation bilaterally, no wheezes, rales, rhonchi  CV: RRR, S1S2, no murmurs, rubs or gallops  Abdominal: Soft, nontender to palpation, non-distended, normoactive bowel sounds  Extremities: WWP, No edema, + peripheral pulses  NEURO: AAOx3, moving all four extremities spontaneously  Skin: No rashes, lesions, hematomas, ecchymosis      LABS:  Labs personally reviewed.                        13.4   12.14 )-----------( 150      ( 09 Sep 2019 06:40 )             41.8     Hgb Trend: 13.4<--, 13.0<--, 12.9<--, 15.6<--  09-09    139  |  101  |  15  ----------------------------<  227<H>  4.3   |  23  |  0.73    Ca    9.3      09 Sep 2019 06:40  Phos  2.6     09-09  Mg     1.9     09-09    TPro  7.4  /  Alb  4.2  /  TBili  0.5  /  DBili  x   /  AST  118<H>  /  ALT  329<H>  /  AlkPhos  67  09-09    Creatinine Trend: 0.73<--, 0.85<--, 0.83<--, 0.84<--, 0.93<--, 0.92<--  PT/INR - ( 09 Sep 2019 06:40 )   PT: 11.5 SEC;   INR: 1.01         PTT - ( 09 Sep 2019 06:40 )  PTT:31.5 SEC    Cytomegalovirus IgM Antibody, Serum (09.07.19 @ 07:20)    CMV IgM Antibody: 39.3 AU/mL    CMV IgM Interpretation: Positive    Cytomegalovirus IgG Antibody, Serum (09.07.19 @ 07:20)    CMV IgG Antibody: 3.00 U/mL    CMV IgG Interpretation: Positive    Herpes simplex (1,2) IgG, Serum (09.08.19 @ 06:50)    Herpes simplex 1 IgG Ab Result: 35.60 Index    Herpes simplex 1 IgG Ab Interp: Positive    Herpes simplex 2 IgG Ab Result: 0.16 Index    Herpes simplex 2 IgG Ab Interp: Negative      RADIOLOGY & ADDITIONAL TESTS:  Imaging Personally Reviewed.    < from: Xray Chest 1 View- PORTABLE-Urgent (09.07.19 @ 17:33) >  IMPRESSION:   Negative for acute pulmonary process.    Consultants:  Hepatology

## 2019-09-09 NOTE — PROGRESS NOTE ADULT - ATTENDING COMMENTS
Patient was seen and examined personally by me. I have discussed the plan and reviewed the resident's note and agree with the above physical exam findings including assessment and plan except as indicated below.    LFTs downtrending. Acute hepatitis panel negative. DENA, smooth muscle Ab and mitochondrial Ab negative. Suspect acute rise in LFTs multifactorial from ETOH and herbal supplements patient was taking previously.  Wheezing is persistent but slightly improved from yesterday. Continue with nebs ATC, symbicort (on advair at home), solumedrol 20mg q8. CXR negative. Denies SOB presently. Give magnesium 2gm x1. Per patient easily triggered by environment including cold temp.  Started fenofibrate for isolated hypertriglyceridemia. No prior hx of pancreatitis. Dietary modification discussed. Repeat lipid panel in 1 month  CMW IGM and IGG positive, unclear significance. The presence of CMV IgM cannot be used by itself to diagnose primary CMV infection because IgM can also be present during secondary CMV infection. F/U CMV PCR.   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 No vesicular rashes appreciated to send off a PCR therefore no indication for it. Patient was seen and examined personally by me. I have discussed the plan and reviewed the resident's note and agree with the above physical exam findings including assessment and plan except as indicated below.    LFTs downtrending. Acute hepatitis panel negative. DENA, smooth muscle Ab and mitochondrial Ab negative. Suspect acute rise in LFTs multifactorial from ETOH and herbal supplements patient was taking previously. Advised patient to stop taking the herbal supplements.  Wheezing is persistent but improved from yesterday. Continue with nebs ATC, symbicort (on advair at home), solumedrol 20mg q8. CXR negative. Denies SOB presently. Give magnesium 2gm x1. Per patient easily triggered by environment including cold temp.  Will give prednisone 40mg x 5 days on DC and advised close PCP and pulm followup. Per pt has a pulmonologist he follows with.  Started fenofibrate for isolated hypertriglyceridemia. No prior hx of pancreatitis. Dietary modification discussed. Repeat lipid panel in 1 month  CMW IGM and IGG positive, unclear significance. The presence of CMV IgM cannot be used by itself to diagnose primary CMV infection because IgM can also be present during secondary CMV infection. F/U CMV PCR.   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 No vesicular rashes appreciated to send off a PCR therefore no indication for it.  Stable for DC home with outpt PCP, pulm and hepatology followup. DC time: 35 min

## 2019-09-09 NOTE — DIETITIAN INITIAL EVALUATION ADULT. - OTHER INFO
RD visited with patient for requested consultation.  Patient noted with transaminitis possibly 2/2 acute hepatitis vs. ethanol vs. drug induced liver injury. Reported with N/V x 3 days PTA; patient reported symptoms resolved at present.  Eating lunch at time of visit, with good intake observed. Reviewed diet Rx: DASH/TLC diet recommendation; reported he follows low sodium diet recommendations most days of the week. Stated he enjoys eating salmon and other fish, grilled lamb, which he seasons with sodium free seasoning.  RD reinforced nutrition education; reviewed general, healthful eating habits.  Discussed A1c elevated at 6.1% - reviewed concentrated sweets to consider limiting in diet as well, which patient verbalized understanding and was able to teach back all information discussed.  Reported usual body weight of ~160 pounds; dosing weight recorded at 85.7 kg 9/6/19 - continue monitoring weight trends closely.  Denies chewing/swallowing difficulties; denies food allergies.  Patient stated he consumed many herbal/OTC supplements PTA & asked if he may continue to incorporate; RD advised oral supplements may likely be contraindicated at this time & to use caution with any oral supplements/always discuss with physician and/or pharmacist to determine appropriateness.

## 2019-09-09 NOTE — DISCHARGE NOTE NURSING/CASE MANAGEMENT/SOCIAL WORK - NSDCFUADDAPPT_GEN_ALL_CORE_FT
Follow up with hepatologist within two weeks from discharge. Follow up with pulmonologist within one month of discharge.

## 2019-09-09 NOTE — PROGRESS NOTE ADULT - PROBLEM SELECTOR PLAN 5
- /104 in the ED, given hydralazine 5mg x1  - d/c lisinopriI per hepatology  - started amlodipine 5mg qd, BP now WNL

## 2019-09-09 NOTE — PROGRESS NOTE ADULT - SUBJECTIVE AND OBJECTIVE BOX
Chief Complaint:  Patient is a 58y old  Male who presents with a chief complaint of nausea, vomiting, elevated LFTs (08 Sep 2019 15:00)      Interval Events:   Significant improvement in LFT's continues.  Continues treatement for asthma exacerbation.      Allergies:  No Known Allergies      Hospital Medications:  ALBUTerol/ipratropium for Nebulization 3 milliLiter(s) Nebulizer every 6 hours  amLODIPine   Tablet 5 milliGRAM(s) Oral daily  buDESOnide 160 MICROgram(s)/formoterol 4.5 MICROgram(s) Inhaler 2 Puff(s) Inhalation two times a day  fenofibrate Tablet 145 milliGRAM(s) Oral daily  ibuprofen  Tablet. 400 milliGRAM(s) Oral once  influenza   Vaccine 0.5 milliLiter(s) IntraMuscular once  lactated ringers. 1000 milliLiter(s) IV Continuous <Continuous>  melatonin 3 milliGRAM(s) Oral at bedtime  methylPREDNISolone sodium succinate Injectable 20 milliGRAM(s) IV Push every 8 hours  multivitamin 1 Tablet(s) Oral daily  ondansetron Injectable 4 milliGRAM(s) IV Push every 6 hours PRN  pantoprazole    Tablet 40 milliGRAM(s) Oral before breakfast  thiamine IVPB 500 milliGRAM(s) IV Intermittent every 8 hours      PMHX/PSHX:  Gastritis  Chronic GERD  Gout  ETOH abuse  Hypertension  Asthma  No significant past surgical history  Acute alcoholic intoxication without complication      Family history:  No pertinent family history in first degree relatives          PHYSICAL EXAM:     GENERAL:  NAD  HEENT:  NC/AT,  conjunctivae clear, sclera -anicteric  CHEST:  Full & symmetric excursion, no increased  HEART:  Regular rhythm  ABDOMEN:  Soft, non-tender, non-distended, normoactive bowel sounds,  no masses ,no hepato-splenomegaly,   EXTREMITIES:  no cyanosis,clubbing or edema  SKIN:  No rash/erythema/ecchymoses/petechiae/wounds/abscess/warm/dry  NEURO:  Alert, oriented    Vital Signs:  Vital Signs Last 24 Hrs  T(C): 36.6 (09 Sep 2019 05:27), Max: 37 (08 Sep 2019 10:00)  T(F): 97.8 (09 Sep 2019 05:27), Max: 98.6 (08 Sep 2019 10:00)  HR: 92 (09 Sep 2019 05:27) (64 - 92)  BP: 135/86 (09 Sep 2019 05:27) (112/70 - 135/86)  BP(mean): --  RR: 17 (09 Sep 2019 05:27) (17 - 18)  SpO2: 99% (09 Sep 2019 05:27) (97% - 99%)  Daily     Daily     LABS:                        13.4   12.14 )-----------( 150      ( 09 Sep 2019 06:40 )             41.8     09-09    139  |  101  |  15  ----------------------------<  227<H>  4.3   |  23  |  0.73    Ca    9.3      09 Sep 2019 06:40  Phos  2.6     09-09  Mg     1.9     09-09    TPro  7.4  /  Alb  4.2  /  TBili  0.5  /  DBili  x   /  AST  118<H>  /  ALT  329<H>  /  AlkPhos  67  09-09    LIVER FUNCTIONS - ( 09 Sep 2019 06:40 )  Alb: 4.2 g/dL / Pro: 7.4 g/dL / ALK PHOS: 67 u/L / ALT: 329 u/L / AST: 118 u/L / GGT: x           PT/INR - ( 09 Sep 2019 06:40 )   PT: 11.5 SEC;   INR: 1.01          PTT - ( 09 Sep 2019 06:40 )  PTT:31.5 SEC        Imaging:

## 2019-09-09 NOTE — PROGRESS NOTE ADULT - ASSESSMENT
Impression  # Hepatocellular liver injury: AST and ALT >15x upper limit of normal initally.  likely Drug- induced liver injury (DILI - He was taking herbal meds at home) vs AIH, DD includes acute viral hepatitis ( E, HSV, CMV)  Unlikely alcoholic hepatitis giving his ALT and AST in 1000s range.  Smooth muscles and aida ab negative.  Hep A/B/C negative.    # Hypertension: BP currently uncontrolled. On lisinopril.   # Acute Asthma exacerbation now on steroids    Recommendations:  - send CMV and HSV1 PCR.  - follow up Hep E IgM  - follow up serum DENA  - trend CMP, INR and platelets daily

## 2019-09-09 NOTE — PROGRESS NOTE ADULT - PROBLEM SELECTOR PLAN 4
c/b ETOH gastritis/duodenitis: c/w protonix 40mg qd  - thiamine 500mg TID x 3 days, ends today; currently not withdrawing  - Stox negative, Utox pending  -  consult for alcohol cessation

## 2019-09-09 NOTE — DIETITIAN INITIAL EVALUATION ADULT. - PERTINENT LABORATORY DATA
09-09 Na139 mmol/L Glu 227 mg/dL<H> K+ 4.3 mmol/L Cr  0.73 mg/dL BUN 15 mg/dL 09-09 Phos 2.6 mg/dL 09-09 Alb 4.2 g/dL 09-06 [id number] 6.1 %<H> 09-06 Chol 138 mg/dL LDL 15 mg/dL HDL 29 mg/dL<L> Trig 638 mg/dL<H>

## 2019-09-09 NOTE — DIETITIAN INITIAL EVALUATION ADULT. - PERTINENT MEDS FT
MEDICATIONS  (STANDING):  amLODIPine   Tablet 5 milliGRAM(s) Oral daily  buDESOnide 160 MICROgram(s)/formoterol 4.5 MICROgram(s) Inhaler 2 Puff(s) Inhalation two times a day  fenofibrate Tablet 145 milliGRAM(s) Oral daily  ibuprofen  Tablet. 400 milliGRAM(s) Oral once  influenza   Vaccine 0.5 milliLiter(s) IntraMuscular once  melatonin 3 milliGRAM(s) Oral at bedtime  methylPREDNISolone sodium succinate Injectable 20 milliGRAM(s) IV Push every 8 hours  multivitamin 1 Tablet(s) Oral daily  pantoprazole    Tablet 40 milliGRAM(s) Oral before breakfast  thiamine IVPB 500 milliGRAM(s) IV Intermittent every 8 hours    MEDICATIONS  (PRN):  ondansetron Injectable 4 milliGRAM(s) IV Push every 6 hours PRN Nausea and/or Vomiting

## 2019-09-09 NOTE — DIETITIAN INITIAL EVALUATION ADULT. - ENERGY NEEDS
Ht: 177.8cm Wt: 9/6/19 dosing 85.7kg BMI: 27.1  IBW: 166 pounds / 75.5kg  Edema: None noted  Pressure Injuries: None noted

## 2019-09-09 NOTE — PROGRESS NOTE ADULT - PROBLEM SELECTOR PLAN 6
- pt with mild diffuse wheezing on exam  - c/w prednisone 40 qd for 5 days. C/w Symbicort and duonebs PRN - pt with mild diffuse wheezing on exam, + asthma exacerbation  - c/w prednisone 40 qd for 5 days. C/w Symbicort and duonebs PRN

## 2019-09-09 NOTE — DIETITIAN INITIAL EVALUATION ADULT. - PROBLEM SELECTOR PLAN 1
- initial AST 2400/ALT 1058, US showed hepatic steatosis without ascites or cirrhosis indicating an acute process likely alcoholic hepatitis and possible hepatocellular injury from OTC herbal supplements patient was taking PTA  - MELD Score 12, indicating <2% 90 day mortality rate- hepatology consulted, awaiting recommendations  - Maddrey Score 13.2- no need for prednisolone  - f/u acute hepatitis panel & autoimmune hepatitis labs  - f/u serum DENA, anti-mitochondrial Ab, anti-smooth muscle Ab  - zofran PRN for associated nausea

## 2019-09-09 NOTE — DIETITIAN INITIAL EVALUATION ADULT. - ADD RECOMMEND
1) Monitor weight trends, PO intake, skin integrity.  2) Suggest POCT glucose monitoring if medically appropriate given A1c 6.1%. 3) Honor food preferences as able.

## 2019-09-10 LAB
CMV DNA CSF QL NAA+PROBE: NOT DETECTED — SIGNIFICANT CHANGE UP
CMV DNA SPEC NAA+PROBE-LOG#: SIGNIFICANT CHANGE UP LOGIU/ML
HSV1 AB FLD QL: SIGNIFICANT CHANGE UP TITER
HSV2 AB FLD-ACNC: SIGNIFICANT CHANGE UP TITER

## 2019-09-11 LAB — HEV AB FLD QL: NEGATIVE — SIGNIFICANT CHANGE UP

## 2020-01-02 ENCOUNTER — EMERGENCY (EMERGENCY)
Facility: HOSPITAL | Age: 60
LOS: 1 days | Discharge: HOME | End: 2020-01-02
Attending: EMERGENCY MEDICINE | Admitting: EMERGENCY MEDICINE
Payer: MEDICAID

## 2020-01-02 VITALS
TEMPERATURE: 98 F | HEART RATE: 77 BPM | RESPIRATION RATE: 20 BRPM | SYSTOLIC BLOOD PRESSURE: 108 MMHG | DIASTOLIC BLOOD PRESSURE: 67 MMHG | OXYGEN SATURATION: 99 %

## 2020-01-02 VITALS
RESPIRATION RATE: 18 BRPM | DIASTOLIC BLOOD PRESSURE: 58 MMHG | OXYGEN SATURATION: 99 % | SYSTOLIC BLOOD PRESSURE: 110 MMHG | HEART RATE: 68 BPM

## 2020-01-02 PROCEDURE — 99283 EMERGENCY DEPT VISIT LOW MDM: CPT

## 2020-01-02 RX ORDER — IPRATROPIUM/ALBUTEROL SULFATE 18-103MCG
3 AEROSOL WITH ADAPTER (GRAM) INHALATION ONCE
Refills: 0 | Status: COMPLETED | OUTPATIENT
Start: 2020-01-02 | End: 2020-01-02

## 2020-01-02 RX ADMIN — Medication 3 MILLILITER(S): at 05:35

## 2020-01-02 NOTE — ED ADULT TRIAGE NOTE - CHIEF COMPLAINT QUOTE
Pt. is brought to [facility] ED by SHELBIE. Pt. intoxicated. Pt. is brought to [facility] ED by SHELBIE. Pt. intoxicated. Neighbors called 911. Pt. was knocking on everyone's door. Pt. is brought to [facility] ED by SHELBIE. Pt. intoxicated. Neighbors called 911. Pt. was knocking on everyone's door. . NAD noted. Pt. appears comfortable.

## 2020-01-02 NOTE — ED PROVIDER NOTE - PATIENT PORTAL LINK FT
You can access the FollowMyHealth Patient Portal offered by [facility] by registering at the following website: [email or web address]. By joining [facility]’s FollowMyHealth portal, you will also be able to view your health information using other applications (apps) compatible with our system.

## 2020-01-02 NOTE — ED ADULT NURSE NOTE - OBJECTIVE STATEMENT
Pt is a 59 year old male reporting to the ED for ETOH. Pt brought in by EMS. As per triage note ems was called by neighbors because pt was knocking on neighbors doors. Pt arrives to room 26 vias stretcher. Pt smells like alcohol. Pt reports drinking "not much" earlier in the day. Pt is AOX2. Pt is slurring words, arousable to verbal stimuli. Pt denies pain at this time. Pt denies fall or hitting his head. Pt valuables with security, clothing outside room 21. MD at bedside evaluating pt, awaiting further orders, will continue to monitor.

## 2020-01-02 NOTE — ED ADULT NURSE REASSESSMENT NOTE - NS ED NURSE REASSESS COMMENT FT1
received report from GAYATHRI Freeman, pt A&OX3, pt appears intoxicate still slurring words, stable when ambulating to restroom, denies cp sob fever chills,

## 2020-01-02 NOTE — ED PROVIDER NOTE - OBJECTIVE STATEMENT
59 male, Hx: Asthma - reports he was drinking tonight, "got a little rowdy" with his neighbors and subsequently ended up being escorted to the hospital by EMS. Denies any head trauma, denies any falls. Denies any current complaints at this time; denies any CP, SOB, nausea, vomiting, abdominal pain, diarrhea, constipation, or blood in stool. Comfortably sleeping on exam - without any complaints. 59 male, Hx: Asthma - reports he was drinking tonight, "got a little rowdy" with his neighbors and subsequently ended up being escorted to the hospital by EMS.   Was banging on walls, making noise in the building. Denies any head trauma, denies any falls. Denies any current complaints at this time; denies any CP, SOB, nausea, vomiting, abdominal pain, diarrhea, constipation, or blood in stool. Comfortably sleeping on exam, easily arousable - without any complaints.

## 2020-01-02 NOTE — ED PROVIDER NOTE - PHYSICAL EXAMINATION
GEN - NAD; well appearing; A+Ox3   HEAD - NC/AT, No visible Ecchymosis, No Abrasions, No Lacerations/Skin Tears     EYES - EOMI, PERRL, no conjunctival pallor, no scleral icterus  ENT -   mucous membranes  moist , no discharge      NECK - Neck supple, No LAD, No Swelling  PULM - (+) Trace Wheezes at Bases  COR -  RRR, S1 S2, no murmurs  ABD - NT/ND, soft, no guarding, no rebound, no masses    BACK - no CVA tenderness, nontender spine     EXTREMS - 0+ edema, no gross deformity, warm and well perfused    SKIN - no rash or bruising      NEUROLOGIC - alert, CN2-12 intact, sensation nl, motor 5/5 RUE/LUE/RLE/LLE

## 2020-01-02 NOTE — ED PROVIDER NOTE - PMH
Alcohol intoxication    Asthma    Gastroenteritis, acute    GERD (gastroesophageal reflux disease)    Hyperlipidemia    Hypertension

## 2020-01-02 NOTE — ED PROVIDER NOTE - CLINICAL SUMMARY MEDICAL DECISION MAKING FREE TEXT BOX
52's male, presents endorsing acute alcohol intoxication. Denies any other co-ingestants - denies any other complaints at this time. Pt is AOX3, sleeping comfortably on exam, answering all questions coherently. Exam with trace wheezes at base; unremarkable exam otherwise without any evidence of trauma, falls, neck tenderness, or gross deformities. Will administer duonebs and re-evaluate clinically. Exam, presentation, and history with minimal concern for toxic coingestants, traumatic sequale to intoxication, PNA, PTX. pt presents endorsing acute alcohol intoxication. Denies any other co-ingestants - denies any other complaints at this time. Pt is AOX3, sleeping comfortably on exam, answering all questions coherently. Exam with trace wheezes at base; unremarkable exam otherwise without any evidence of trauma, falls, neck tenderness, or gross deformities.  States he has no one available to pick him up. Will administer duonebs and re-evaluate clinically. Exam, presentation, and history with minimal concern for toxic coingestants, traumatic sequale to intoxication, PNA, PTX.

## 2020-01-02 NOTE — ED ADULT NURSE NOTE - CHIEF COMPLAINT QUOTE
Pt. is brought to [facility] ED by SHELBIE. Pt. intoxicated. Neighbors called 911. Pt. was knocking on everyone's door. . NAD noted. Pt. appears comfortable.

## 2020-01-02 NOTE — ED PROVIDER NOTE - ATTENDING CONTRIBUTION TO CARE
Attending Attestation: Dr. Worley  I have personally performed a history and physical examination of the patient and discussed management with the resident as well as the patient.  I reviewed the resident's note and agree with the documented findings and plan of care.  I have authored and modified critical sections of the Provider Note, including but not limited to HPI, Physical Exam and MDM. pt presents endorsing acute alcohol intoxication. Denies any other co-ingestants - denies any other complaints at this time. Pt is AOX3, sleeping comfortably on exam, answering all questions coherently. Exam with trace wheezes at base; unremarkable exam otherwise without any evidence of trauma, falls, neck tenderness, or gross deformities.  States he has no one available to pick him up. Will administer duonebs and re-evaluate clinically. Exam, presentation, and history with minimal concern for toxic coingestants, traumatic sequale to intoxication, PNA, PTX.

## 2020-01-08 ENCOUNTER — EMERGENCY (EMERGENCY)
Facility: HOSPITAL | Age: 60
LOS: 1 days | Discharge: HOME | End: 2020-01-08
Attending: EMERGENCY MEDICINE | Admitting: EMERGENCY MEDICINE
Payer: MEDICAID

## 2020-01-08 VITALS
HEART RATE: 101 BPM | DIASTOLIC BLOOD PRESSURE: 80 MMHG | TEMPERATURE: 98 F | SYSTOLIC BLOOD PRESSURE: 128 MMHG | OXYGEN SATURATION: 100 % | RESPIRATION RATE: 16 BRPM

## 2020-01-08 PROCEDURE — 99284 EMERGENCY DEPT VISIT MOD MDM: CPT

## 2020-01-08 RX ORDER — SODIUM CHLORIDE 9 MG/ML
1000 INJECTION INTRAMUSCULAR; INTRAVENOUS; SUBCUTANEOUS ONCE
Refills: 0 | Status: COMPLETED | OUTPATIENT
Start: 2020-01-08 | End: 2020-01-08

## 2020-01-08 RX ORDER — ONDANSETRON 8 MG/1
4 TABLET, FILM COATED ORAL ONCE
Refills: 0 | Status: COMPLETED | OUTPATIENT
Start: 2020-01-08 | End: 2020-01-08

## 2020-01-08 NOTE — ED ADULT NURSE NOTE - OBJECTIVE STATEMENT
Patient received in #10B for etoh intoxation. Patient A&Ox3, reports he went out and drank vodka today and his girlfriend called 911. Patient reports he "drinks whenever he is stressed". Denies any pain currently. Denies any illicit drug use, falls, hallucinations, S/I, H/I. Will monitor.

## 2020-01-08 NOTE — ED PROVIDER NOTE - CLINICAL SUMMARY MEDICAL DECISION MAKING FREE TEXT BOX
Pt initally evaluated for alcohol intoxication, now clinically sober, no signs of withdrawal, HR WNL, pt feels asymptomatic, stable for dc Pt pw alc intox from home, no signs of trauma, VS stable, will allow to clinically sober and reassess -George

## 2020-01-08 NOTE — ED PROVIDER NOTE - ATTENDING CONTRIBUTION TO CARE
Pt with hx of ETOH abuse w/ ETOH intox, also pt states vomiting and abdominal pain this even w/ hx of pancreatitis, will monitor to sobriety, will check basics + lipase r/o pancreatitis   GEN - NAD; slurred speech, appearing intoxicated ; A+O x3   HEAD - NC/AT     EYES - EOMI, no conjunctival pallor, no scleral icterus  ENT -   mucous membranes  moist , no discharge      NECK - Neck supple  PULM - CTA b/l,  symmetric breath sounds  COR -  RRR, S1 S2, no murmurs  ABD - , ND, NT, soft, no guarding, no rebound, no masses    BACK - no CVA tenderness, nontender spine     EXTREMS - no edema, no deformity, warm and well perfused    SKIN - no rash or bruising      NEUROLOGIC - alert, sensation nl, motor 5/5 RUE/LUE/RLE/LLE

## 2020-01-08 NOTE — ED ADULT NURSE NOTE - INTERVENTIONS DEFINITIONS
[place] to call system/Call bell, personal items and telephone within reach/Instruct patient to call for assistance/Stretcher in lowest position, wheels locked, appropriate side rails in place/Monitor gait and stability

## 2020-01-08 NOTE — ED ADULT TRIAGE NOTE - CHIEF COMPLAINT QUOTE
Pt brought from home, EMS activated by wife for excessive ETOH use. Pt slurring words admits to drinking Vodka.

## 2020-01-08 NOTE — ED PROVIDER NOTE - PROGRESS NOTE DETAILS
George, pgy3: reassessed pt, still clinically intoxicated, will require additional time in the ER prior to dc Jonnie PGY3: Received sign out from my colleague Dr. Vazquez, pt presented to ED with etoh intoxication, on reexam pt appears clinically sober, tolerating PO in ED and ambulating well, reports will be able to take a cab home. Agrees with plan for dc. Will d/c with PMD f/u, strict return precautions given with read back per pt/family/caregiver.

## 2020-01-08 NOTE — ED PROVIDER NOTE - OBJECTIVE STATEMENT
Pt brought to main ER from , pt initially presented intoxicated and requesting detox; initial Etoh level elevated 10hrs ago, now long enough to have cleared. Pt sent to main ER Pt brought to main ER from , pt initially presented intoxicated and requesting detox; initial Etoh level elevated 10hrs ago, now long enough to have cleared. Pt sent to main ER for persistent diarrhea 59y m pw alcohol intoxication from home, EMS called by wife due to pt slurring speech. Pt clinically intox w/ AOB, reports drinking vodka earlier this evening, unable to quantify how much .

## 2020-01-08 NOTE — ED PROVIDER NOTE - PATIENT PORTAL LINK FT
You can access the FollowMyHealth Patient Portal offered by [facility] by registering at the following website: [email or web address]. By joining [employer]’s FollowMyHealth portal, you will also be able to view your health information using other applications (apps) compatible with our system. You can access the FollowMyHealth Patient Portal offered by [facility] by registering at the following website: [email or web address]. By joining [employer]’s FollowMyHealth portal, you will also be able to view your health information using other applications (apps) compatible with our system.

## 2020-01-08 NOTE — ED PROVIDER NOTE - NSFOLLOWUPINSTRUCTIONS_ED_ALL_ED_FT
PLEASE FOLLOW UP WITH YOUR PRIMARY CARE PHYSICIAN FOR ANY ADDITIONAL QUESTIONS OR CONCERNS Thank you for visiting our Emergency Department, it has been a pleasure taking part in your healthcare.    Your discharge diagnosis is: alcohol intoxication  Please take all discharge medications as indicated below:  Please continue all medications as rx'd by your PMD.  Please follow up with your PMD within x48 hours.  A copy of resulted labs, imaging, and findings have been provided to you.   You have had a detailed discussion with your provider regarding your diagnosis, care management and discharge planning including, but not limited to: return precautions, follow up visits with existing or new providers, new prescriptions and/or medication changes, wound and/or splint/cast care or other care   aspects specific to your diagnosis and treatment. You have been given the opportunity to have your questions answered. At this time you have been deemed stable and fit for discharge.  Return precautions to the Emergency Department include but are not limited to: unrelenting nausea, vomiting, fever, chills, chest pain, shortness of breath, dizziness, chest or abdominal pain, worsening back pain, syncope, blood in urine or stool, headache that doesn't resolve, numbness or tingling, loss of sensation, loss of motor function, or any other concerning symptoms.

## 2020-01-08 NOTE — ED PROVIDER NOTE - NEUROLOGICAL LEVEL OF CONSCIOUSNESS
A&Ox3 but clinically intoxicated, slurring speech 2/2 intoxication, no focal neuro deficits/alert/follows commands

## 2020-01-09 ENCOUNTER — INPATIENT (INPATIENT)
Facility: HOSPITAL | Age: 60
LOS: 3 days | Discharge: HOME | End: 2020-01-13
Attending: HOSPITALIST | Admitting: HOSPITALIST
Payer: MEDICAID

## 2020-01-09 VITALS
DIASTOLIC BLOOD PRESSURE: 91 MMHG | TEMPERATURE: 98 F | RESPIRATION RATE: 18 BRPM | SYSTOLIC BLOOD PRESSURE: 154 MMHG | HEART RATE: 109 BPM | OXYGEN SATURATION: 100 %

## 2020-01-09 VITALS
SYSTOLIC BLOOD PRESSURE: 122 MMHG | RESPIRATION RATE: 16 BRPM | DIASTOLIC BLOOD PRESSURE: 84 MMHG | HEART RATE: 89 BPM | OXYGEN SATURATION: 98 % | TEMPERATURE: 98 F

## 2020-01-09 DIAGNOSIS — F10.239 ALCOHOL DEPENDENCE WITH WITHDRAWAL, UNSPECIFIED: ICD-10-CM

## 2020-01-09 DIAGNOSIS — K21.9 GASTRO-ESOPHAGEAL REFLUX DISEASE WITHOUT ESOPHAGITIS: ICD-10-CM

## 2020-01-09 DIAGNOSIS — R07.9 CHEST PAIN, UNSPECIFIED: ICD-10-CM

## 2020-01-09 DIAGNOSIS — R74.8 ABNORMAL LEVELS OF OTHER SERUM ENZYMES: ICD-10-CM

## 2020-01-09 DIAGNOSIS — I10 ESSENTIAL (PRIMARY) HYPERTENSION: ICD-10-CM

## 2020-01-09 DIAGNOSIS — M10.9 GOUT, UNSPECIFIED: ICD-10-CM

## 2020-01-09 DIAGNOSIS — Z29.9 ENCOUNTER FOR PROPHYLACTIC MEASURES, UNSPECIFIED: ICD-10-CM

## 2020-01-09 DIAGNOSIS — J45.909 UNSPECIFIED ASTHMA, UNCOMPLICATED: ICD-10-CM

## 2020-01-09 DIAGNOSIS — Z02.9 ENCOUNTER FOR ADMINISTRATIVE EXAMINATIONS, UNSPECIFIED: ICD-10-CM

## 2020-01-09 LAB
ALBUMIN SERPL ELPH-MCNC: 4.9 G/DL — SIGNIFICANT CHANGE UP (ref 3.3–5)
ALBUMIN SERPL ELPH-MCNC: 5 G/DL — SIGNIFICANT CHANGE UP (ref 3.3–5)
ALP SERPL-CCNC: 63 U/L — SIGNIFICANT CHANGE UP (ref 40–120)
ALP SERPL-CCNC: 68 U/L — SIGNIFICANT CHANGE UP (ref 40–120)
ALT FLD-CCNC: 52 U/L — HIGH (ref 4–41)
ALT FLD-CCNC: 55 U/L — HIGH (ref 4–41)
ANION GAP SERPL CALC-SCNC: 16 MMO/L — HIGH (ref 7–14)
ANION GAP SERPL CALC-SCNC: 16 MMO/L — HIGH (ref 7–14)
ANION GAP SERPL CALC-SCNC: 18 MMO/L — HIGH (ref 7–14)
APAP SERPL-MCNC: < 15 UG/ML — LOW (ref 15–25)
APTT BLD: 45.1 SEC — HIGH (ref 27.5–36.3)
AST SERPL-CCNC: 67 U/L — HIGH (ref 4–40)
AST SERPL-CCNC: 74 U/L — HIGH (ref 4–40)
BASOPHILS # BLD AUTO: 0.11 K/UL — SIGNIFICANT CHANGE UP (ref 0–0.2)
BASOPHILS # BLD AUTO: 0.16 K/UL — SIGNIFICANT CHANGE UP (ref 0–0.2)
BASOPHILS NFR BLD AUTO: 1.5 % — SIGNIFICANT CHANGE UP (ref 0–2)
BASOPHILS NFR BLD AUTO: 1.8 % — SIGNIFICANT CHANGE UP (ref 0–2)
BILIRUB SERPL-MCNC: 0.2 MG/DL — SIGNIFICANT CHANGE UP (ref 0.2–1.2)
BILIRUB SERPL-MCNC: 0.5 MG/DL — SIGNIFICANT CHANGE UP (ref 0.2–1.2)
BUN SERPL-MCNC: 14 MG/DL — SIGNIFICANT CHANGE UP (ref 7–23)
BUN SERPL-MCNC: 14 MG/DL — SIGNIFICANT CHANGE UP (ref 7–23)
BUN SERPL-MCNC: 18 MG/DL — SIGNIFICANT CHANGE UP (ref 7–23)
CALCIUM SERPL-MCNC: 9.5 MG/DL — SIGNIFICANT CHANGE UP (ref 8.4–10.5)
CALCIUM SERPL-MCNC: 9.6 MG/DL — SIGNIFICANT CHANGE UP (ref 8.4–10.5)
CALCIUM SERPL-MCNC: 9.8 MG/DL — SIGNIFICANT CHANGE UP (ref 8.4–10.5)
CHLORIDE SERPL-SCNC: 101 MMOL/L — SIGNIFICANT CHANGE UP (ref 98–107)
CHLORIDE SERPL-SCNC: 105 MMOL/L — SIGNIFICANT CHANGE UP (ref 98–107)
CHLORIDE SERPL-SCNC: 99 MMOL/L — SIGNIFICANT CHANGE UP (ref 98–107)
CK MB BLD-MCNC: 0.3 — SIGNIFICANT CHANGE UP (ref 0–2.5)
CK MB BLD-MCNC: 3 NG/ML — SIGNIFICANT CHANGE UP (ref 1–6.6)
CK SERPL-CCNC: 940 U/L — HIGH (ref 30–200)
CO2 SERPL-SCNC: 22 MMOL/L — SIGNIFICANT CHANGE UP (ref 22–31)
CO2 SERPL-SCNC: 23 MMOL/L — SIGNIFICANT CHANGE UP (ref 22–31)
CO2 SERPL-SCNC: 24 MMOL/L — SIGNIFICANT CHANGE UP (ref 22–31)
CREAT SERPL-MCNC: 0.9 MG/DL — SIGNIFICANT CHANGE UP (ref 0.5–1.3)
CREAT SERPL-MCNC: 0.93 MG/DL — SIGNIFICANT CHANGE UP (ref 0.5–1.3)
CREAT SERPL-MCNC: 0.93 MG/DL — SIGNIFICANT CHANGE UP (ref 0.5–1.3)
EOSINOPHIL # BLD AUTO: 0.22 K/UL — SIGNIFICANT CHANGE UP (ref 0–0.5)
EOSINOPHIL # BLD AUTO: 0.38 K/UL — SIGNIFICANT CHANGE UP (ref 0–0.5)
EOSINOPHIL NFR BLD AUTO: 3 % — SIGNIFICANT CHANGE UP (ref 0–6)
EOSINOPHIL NFR BLD AUTO: 4.4 % — SIGNIFICANT CHANGE UP (ref 0–6)
ETHANOL BLD-MCNC: < 10 MG/DL — SIGNIFICANT CHANGE UP
GLUCOSE SERPL-MCNC: 98 MG/DL — SIGNIFICANT CHANGE UP (ref 70–99)
GLUCOSE SERPL-MCNC: 98 MG/DL — SIGNIFICANT CHANGE UP (ref 70–99)
GLUCOSE SERPL-MCNC: 99 MG/DL — SIGNIFICANT CHANGE UP (ref 70–99)
HCT VFR BLD CALC: 46 % — SIGNIFICANT CHANGE UP (ref 39–50)
HCT VFR BLD CALC: 49.8 % — SIGNIFICANT CHANGE UP (ref 39–50)
HGB BLD-MCNC: 15.2 G/DL — SIGNIFICANT CHANGE UP (ref 13–17)
HGB BLD-MCNC: 16.8 G/DL — SIGNIFICANT CHANGE UP (ref 13–17)
IMM GRANULOCYTES NFR BLD AUTO: 0.3 % — SIGNIFICANT CHANGE UP (ref 0–1.5)
IMM GRANULOCYTES NFR BLD AUTO: 0.3 % — SIGNIFICANT CHANGE UP (ref 0–1.5)
INR BLD: 0.96 — SIGNIFICANT CHANGE UP (ref 0.88–1.17)
LIDOCAIN IGE QN: 16.4 U/L — SIGNIFICANT CHANGE UP (ref 7–60)
LIDOCAIN IGE QN: 39.6 U/L — SIGNIFICANT CHANGE UP (ref 7–60)
LYMPHOCYTES # BLD AUTO: 2.82 K/UL — SIGNIFICANT CHANGE UP (ref 1–3.3)
LYMPHOCYTES # BLD AUTO: 38.3 % — SIGNIFICANT CHANGE UP (ref 13–44)
LYMPHOCYTES # BLD AUTO: 4.94 K/UL — HIGH (ref 1–3.3)
LYMPHOCYTES # BLD AUTO: 56.9 % — HIGH (ref 13–44)
MAGNESIUM SERPL-MCNC: 2.1 MG/DL — SIGNIFICANT CHANGE UP (ref 1.6–2.6)
MCHC RBC-ENTMCNC: 29 PG — SIGNIFICANT CHANGE UP (ref 27–34)
MCHC RBC-ENTMCNC: 29.1 PG — SIGNIFICANT CHANGE UP (ref 27–34)
MCHC RBC-ENTMCNC: 33 % — SIGNIFICANT CHANGE UP (ref 32–36)
MCHC RBC-ENTMCNC: 33.7 % — SIGNIFICANT CHANGE UP (ref 32–36)
MCV RBC AUTO: 85.9 FL — SIGNIFICANT CHANGE UP (ref 80–100)
MCV RBC AUTO: 88 FL — SIGNIFICANT CHANGE UP (ref 80–100)
MONOCYTES # BLD AUTO: 0.6 K/UL — SIGNIFICANT CHANGE UP (ref 0–0.9)
MONOCYTES # BLD AUTO: 0.64 K/UL — SIGNIFICANT CHANGE UP (ref 0–0.9)
MONOCYTES NFR BLD AUTO: 7.4 % — SIGNIFICANT CHANGE UP (ref 2–14)
MONOCYTES NFR BLD AUTO: 8.1 % — SIGNIFICANT CHANGE UP (ref 2–14)
NEUTROPHILS # BLD AUTO: 2.53 K/UL — SIGNIFICANT CHANGE UP (ref 1.8–7.4)
NEUTROPHILS # BLD AUTO: 3.6 K/UL — SIGNIFICANT CHANGE UP (ref 1.8–7.4)
NEUTROPHILS NFR BLD AUTO: 29.2 % — LOW (ref 43–77)
NEUTROPHILS NFR BLD AUTO: 48.8 % — SIGNIFICANT CHANGE UP (ref 43–77)
NRBC # FLD: 0 K/UL — SIGNIFICANT CHANGE UP (ref 0–0)
NRBC # FLD: 0 K/UL — SIGNIFICANT CHANGE UP (ref 0–0)
PHOSPHATE SERPL-MCNC: 2.5 MG/DL — SIGNIFICANT CHANGE UP (ref 2.5–4.5)
PLATELET # BLD AUTO: 200 K/UL — SIGNIFICANT CHANGE UP (ref 150–400)
PLATELET # BLD AUTO: 238 K/UL — SIGNIFICANT CHANGE UP (ref 150–400)
PMV BLD: 10.1 FL — SIGNIFICANT CHANGE UP (ref 7–13)
PMV BLD: 9.3 FL — SIGNIFICANT CHANGE UP (ref 7–13)
POTASSIUM SERPL-MCNC: 4 MMOL/L — SIGNIFICANT CHANGE UP (ref 3.5–5.3)
POTASSIUM SERPL-MCNC: 4.3 MMOL/L — SIGNIFICANT CHANGE UP (ref 3.5–5.3)
POTASSIUM SERPL-MCNC: 4.4 MMOL/L — SIGNIFICANT CHANGE UP (ref 3.5–5.3)
POTASSIUM SERPL-SCNC: 4 MMOL/L — SIGNIFICANT CHANGE UP (ref 3.5–5.3)
POTASSIUM SERPL-SCNC: 4.3 MMOL/L — SIGNIFICANT CHANGE UP (ref 3.5–5.3)
POTASSIUM SERPL-SCNC: 4.4 MMOL/L — SIGNIFICANT CHANGE UP (ref 3.5–5.3)
PROT SERPL-MCNC: 8 G/DL — SIGNIFICANT CHANGE UP (ref 6–8.3)
PROT SERPL-MCNC: 8.4 G/DL — HIGH (ref 6–8.3)
PROTHROM AB SERPL-ACNC: 10.6 SEC — SIGNIFICANT CHANGE UP (ref 9.8–13.1)
RBC # BLD: 5.23 M/UL — SIGNIFICANT CHANGE UP (ref 4.2–5.8)
RBC # BLD: 5.8 M/UL — SIGNIFICANT CHANGE UP (ref 4.2–5.8)
RBC # FLD: 13.6 % — SIGNIFICANT CHANGE UP (ref 10.3–14.5)
RBC # FLD: 13.6 % — SIGNIFICANT CHANGE UP (ref 10.3–14.5)
SALICYLATES SERPL-MCNC: < 5 MG/DL — LOW (ref 15–30)
SODIUM SERPL-SCNC: 139 MMOL/L — SIGNIFICANT CHANGE UP (ref 135–145)
SODIUM SERPL-SCNC: 140 MMOL/L — SIGNIFICANT CHANGE UP (ref 135–145)
SODIUM SERPL-SCNC: 145 MMOL/L — SIGNIFICANT CHANGE UP (ref 135–145)
TROPONIN T, HIGH SENSITIVITY: 8 NG/L — SIGNIFICANT CHANGE UP (ref ?–14)
TROPONIN T, HIGH SENSITIVITY: 9 NG/L — SIGNIFICANT CHANGE UP (ref ?–14)
WBC # BLD: 7.37 K/UL — SIGNIFICANT CHANGE UP (ref 3.8–10.5)
WBC # BLD: 8.68 K/UL — SIGNIFICANT CHANGE UP (ref 3.8–10.5)
WBC # FLD AUTO: 7.37 K/UL — SIGNIFICANT CHANGE UP (ref 3.8–10.5)
WBC # FLD AUTO: 8.68 K/UL — SIGNIFICANT CHANGE UP (ref 3.8–10.5)

## 2020-01-09 PROCEDURE — 71045 X-RAY EXAM CHEST 1 VIEW: CPT | Mod: 26

## 2020-01-09 RX ORDER — ALBUTEROL 90 UG/1
2 AEROSOL, METERED ORAL EVERY 6 HOURS
Refills: 0 | Status: DISCONTINUED | OUTPATIENT
Start: 2020-01-09 | End: 2020-01-13

## 2020-01-09 RX ORDER — FOLIC ACID 0.8 MG
1 TABLET ORAL DAILY
Refills: 0 | Status: DISCONTINUED | OUTPATIENT
Start: 2020-01-09 | End: 2020-01-09

## 2020-01-09 RX ORDER — FENOFIBRATE,MICRONIZED 130 MG
145 CAPSULE ORAL DAILY
Refills: 0 | Status: DISCONTINUED | OUTPATIENT
Start: 2020-01-09 | End: 2020-01-10

## 2020-01-09 RX ORDER — MONTELUKAST 4 MG/1
10 TABLET, CHEWABLE ORAL DAILY
Refills: 0 | Status: DISCONTINUED | OUTPATIENT
Start: 2020-01-09 | End: 2020-01-13

## 2020-01-09 RX ORDER — SODIUM CHLORIDE 9 MG/ML
1000 INJECTION, SOLUTION INTRAVENOUS ONCE
Refills: 0 | Status: COMPLETED | OUTPATIENT
Start: 2020-01-09 | End: 2020-01-09

## 2020-01-09 RX ORDER — THIAMINE MONONITRATE (VIT B1) 100 MG
500 TABLET ORAL ONCE
Refills: 0 | Status: COMPLETED | OUTPATIENT
Start: 2020-01-09 | End: 2020-01-09

## 2020-01-09 RX ORDER — HEPARIN SODIUM 5000 [USP'U]/ML
5000 INJECTION INTRAVENOUS; SUBCUTANEOUS EVERY 12 HOURS
Refills: 0 | Status: DISCONTINUED | OUTPATIENT
Start: 2020-01-09 | End: 2020-01-13

## 2020-01-09 RX ORDER — SODIUM CHLORIDE 9 MG/ML
3 INJECTION INTRAMUSCULAR; INTRAVENOUS; SUBCUTANEOUS EVERY 8 HOURS
Refills: 0 | Status: DISCONTINUED | OUTPATIENT
Start: 2020-01-09 | End: 2020-01-13

## 2020-01-09 RX ORDER — PANTOPRAZOLE SODIUM 20 MG/1
40 TABLET, DELAYED RELEASE ORAL
Refills: 0 | Status: DISCONTINUED | OUTPATIENT
Start: 2020-01-09 | End: 2020-01-13

## 2020-01-09 RX ORDER — THIAMINE MONONITRATE (VIT B1) 100 MG
500 TABLET ORAL ONCE
Refills: 0 | Status: DISCONTINUED | OUTPATIENT
Start: 2020-01-09 | End: 2020-01-09

## 2020-01-09 RX ORDER — FOLIC ACID 0.8 MG
1 TABLET ORAL DAILY
Refills: 0 | Status: DISCONTINUED | OUTPATIENT
Start: 2020-01-09 | End: 2020-01-13

## 2020-01-09 RX ORDER — BUDESONIDE AND FORMOTEROL FUMARATE DIHYDRATE 160; 4.5 UG/1; UG/1
2 AEROSOL RESPIRATORY (INHALATION)
Refills: 0 | Status: DISCONTINUED | OUTPATIENT
Start: 2020-01-09 | End: 2020-01-13

## 2020-01-09 RX ORDER — THIAMINE MONONITRATE (VIT B1) 100 MG
100 TABLET ORAL DAILY
Refills: 0 | Status: COMPLETED | OUTPATIENT
Start: 2020-01-09 | End: 2020-01-12

## 2020-01-09 RX ORDER — ASPIRIN/CALCIUM CARB/MAGNESIUM 324 MG
81 TABLET ORAL DAILY
Refills: 0 | Status: DISCONTINUED | OUTPATIENT
Start: 2020-01-09 | End: 2020-01-13

## 2020-01-09 RX ORDER — FOLIC ACID 0.8 MG
1 TABLET ORAL ONCE
Refills: 0 | Status: COMPLETED | OUTPATIENT
Start: 2020-01-09 | End: 2020-01-09

## 2020-01-09 RX ORDER — THIAMINE MONONITRATE (VIT B1) 100 MG
100 TABLET ORAL DAILY
Refills: 0 | Status: DISCONTINUED | OUTPATIENT
Start: 2020-01-09 | End: 2020-01-09

## 2020-01-09 RX ORDER — LISINOPRIL 2.5 MG/1
5 TABLET ORAL DAILY
Refills: 0 | Status: DISCONTINUED | OUTPATIENT
Start: 2020-01-09 | End: 2020-01-13

## 2020-01-09 RX ADMIN — Medication 2 MILLIGRAM(S): at 22:04

## 2020-01-09 RX ADMIN — Medication 105 MILLIGRAM(S): at 18:37

## 2020-01-09 RX ADMIN — Medication 1 MILLIGRAM(S): at 22:05

## 2020-01-09 RX ADMIN — SODIUM CHLORIDE 3 MILLILITER(S): 9 INJECTION INTRAMUSCULAR; INTRAVENOUS; SUBCUTANEOUS at 23:40

## 2020-01-09 RX ADMIN — Medication 2 MILLIGRAM(S): at 18:40

## 2020-01-09 RX ADMIN — ONDANSETRON 4 MILLIGRAM(S): 8 TABLET, FILM COATED ORAL at 00:30

## 2020-01-09 RX ADMIN — SODIUM CHLORIDE 1000 MILLILITER(S): 9 INJECTION INTRAMUSCULAR; INTRAVENOUS; SUBCUTANEOUS at 00:30

## 2020-01-09 RX ADMIN — Medication 100 MILLIGRAM(S): at 21:37

## 2020-01-09 RX ADMIN — Medication 30 MILLILITER(S): at 07:37

## 2020-01-09 RX ADMIN — SODIUM CHLORIDE 1000 MILLILITER(S): 9 INJECTION, SOLUTION INTRAVENOUS at 18:39

## 2020-01-09 NOTE — H&P ADULT - PROBLEM SELECTOR PLAN 9
1.  Name of PCP:  2.  PCP Contacted on Admission: [ ] Y    [X] N    3.  PCP contacted at Discharge: [ ] Y    [ ] N    [ ] N/A  4.  Post-Discharge Appointment Date and Location:  5.  Summary of Handoff given to PCP:

## 2020-01-09 NOTE — H&P ADULT - RS GEN PE MLT RESP DETAILS PC
airway patent/breath sounds equal/good air movement/respirations non-labored/clear to auscultation bilaterally/no chest wall tenderness/no intercostal retractions/no rales/no rhonchi/no subcutaneous emphysema/no wheezes

## 2020-01-09 NOTE — ED ADULT NURSE NOTE - OBJECTIVE STATEMENT
Break Coverage RN: Patient is a 60 y/o M received in gowns from , p/w a c/c of ETOH withdrawal.  Patient reports, "I;m a binge drinker," denies hx of ETOH withdrawal seizures.  Patient calm and cooperative.

## 2020-01-09 NOTE — H&P ADULT - PROBLEM SELECTOR PLAN 2
ADENIKE initiated.  F/U Toxicology,   eval. Cardiac monitor.  Delta troponin negative, low suspicion for ACS  Check A1C, FLP, TSH.  c/w ASA, ACE-i  Hold statin due to elevated LFTs, add back fenofibrate pending lipid panel (reportedly self d/c'ed) Delta troponin negative, low suspicion for ACS  Check A1C, FLP, TSH.  c/w ASA, ACE-i  Hold statin due to elevated LFTs, add back fenofibrate pending lipid panel (reportedly self d/c'ed)    #Elevated CK, likely secondary to rhabdo secondary to recent fall  -LR, trend CK, if downtrending CK would d/c tele

## 2020-01-09 NOTE — H&P ADULT - NSHPSOCIALHISTORY_GEN_ALL_CORE
.  Lives with a friend.  Denies Nicotine.  ETOH,. Binges 5 x/ month lasting 2 to 3 days .  Lives with girlfriend   Denies Nicotine.  ETOH,. Binges 5 x/ month lasting 2 to 3 days

## 2020-01-09 NOTE — H&P ADULT - NEGATIVE ENMT SYMPTOMS
no ear pain/no tinnitus/no vertigo/no sinus symptoms/no nasal congestion/no nasal discharge/no nasal obstruction/no post-nasal discharge

## 2020-01-09 NOTE — H&P ADULT - GASTROINTESTINAL DETAILS
soft/nontender/no distention/no masses palpable/bowel sounds normal/no bruit/no rigidity soft/nontender/no distention/no masses palpable/bowel sounds normal/no bruit/no guarding/no rigidity

## 2020-01-09 NOTE — ED PROVIDER NOTE - ATTENDING CONTRIBUTION TO CARE
DR. THORNE, ATTENDING MD-  I performed a face to face bedside interview with the patient regarding history of present illness, review of symptoms and past medical history. I completed an independent physical exam.  I have discussed the patient's plan of care with the PA.   Documentation as above in the note.    60 y/o male h/o gastritis, asthma, gerd, etoh abuse, htn here with w/d.  States that last drink was yesterday.  Began to have chest discomfort which occurs when he goes into w/d.  Tremulous.  Mild w/d.  Will tx with benzos, reassess, may need admission for further w/d care.

## 2020-01-09 NOTE — ED PROVIDER NOTE - OBJECTIVE STATEMENT
59 y.o male pmhx of HTN , ETOH abuse coming in with feeling like he is having alcohol withdrawal. Pt was seen earlier in ED for intox and discharged this morning, states went home, took mag/bcomplex and "old ativan" he had some and took a nap. Woke up feeling shaky and chest pain that he normally feels when he is withdrawing. He also got into an argument with his GF earlier that upset him. Denies any SI or HI. No other drug use.

## 2020-01-09 NOTE — ED PROVIDER NOTE - PROGRESS NOTE DETAILS
Dr. Waggoner: called to evaluate pt for placement in ED; pt tremulous in hands, with tongue fasciculations, tachycardic, states last EtOH was last night and he feels like he is in EtOH WD; recommend placement in main ED for further eval

## 2020-01-09 NOTE — H&P ADULT - PROBLEM SELECTOR PLAN 7
Currently not on any meds.  F/U uric acid level. VTE With Heparin SQ BiD.  Fall, Aspiration, safety and seizure precautions.

## 2020-01-09 NOTE — H&P ADULT - NSICDXPASTMEDICALHX_GEN_ALL_CORE_FT
PAST MEDICAL HISTORY:  Asthma     Chronic GERD     ETOH abuse     Gastritis     Gout     Hypertension

## 2020-01-09 NOTE — H&P ADULT - HISTORY OF PRESENT ILLNESS
59M self ambulating, history of asthma, never intubated, HTN, Dyslipidemia, GERD, ETOH abuse, came to the Ed on 1/8 due to ETOH intoxication and was discharged home 1/9 AM with instructions to return to the Ed if he begins to feel bad. The pt went home and developed tremors, anxiety and depression similar to his last ETOH withdrawal around 8/2019. The pt came back to the ED and developed non exertional, intermittent 3/10, chest tightness, felt across the chest, lasting a few seconds and resolving on it's own. Last felt chest pain @ 3PM on 1/9/2020. Denies SI/ Hi, palpitations, SOB, wheeze, nausea, vomit, diarrhea, constipation, abdominal pain, chills, diaphoresis. dysuria. The pt says his last ETOH drink was 40 hours ago, after drinking a Vodka about 1 liter over the past day. In the ED, Trop = 9, EKG NSR @ 93b/min, CIWA = 5 and was given Ativan 2 mg IV & Librium 100 mg po. 59M self ambulating, history of asthma, never intubated, HTN, Dyslipidemia, GERD, ETOH abuse (no history of DTs/seizures), came to the Ed on 1/8 due to ETOH intoxication and was discharged home 1/9 AM with instructions to return to the Ed if he begins to feel bad. The pt went home and developed tremors, anxiety and depression similar to his last ETOH withdrawal around 8/2019. The pt came back to the ED and developed non exertional, intermittent 3/10, chest tightness, felt across the chest, lasting a few seconds and resolving on it's own. Last felt chest pain @ 3PM on 1/9/2020. Denies SI/ Hi, palpitations, SOB, wheeze, nausea, vomit, diarrhea, constipation, abdominal pain, chills, diaphoresis. dysuria. The pt says his last ETOH drink was 40 hours ago, after drinking a Vodka about 1 liter over the past day. In the ED, Trop = 9, EKG NSR @ 93b/min, CIWA = 5 and was given Ativan 2 mg IV & Librium 100 mg po.

## 2020-01-09 NOTE — H&P ADULT - NSHPLABSRESULTS_GEN_ALL_CORE
NSR @ 93b/ min, LAE, QT/ QTC= 366/ 455    TROP = 9      CXR preliminary= clear.                        15.2   7.37  )-----------( 200      ( 09 Jan 2020 17:12 )             46.0   01-09    140  |  101  |  14  ----------------------------<  99  4.3   |  23  |  0.90    Ca    9.8      09 Jan 2020 18:28  Phos  2.5     01-09  Mg     2.1     01-09    TPro  8.0  /  Alb  4.9  /  TBili  0.5  /  DBili  x   /  AST  74<H>  /  ALT  52<H>  /  AlkPhos  63  01-09 15.2   7.37  )-----------( 200      ( 09 Jan 2020 17:12 )             46.0   01-09    140  |  101  |  14  ----------------------------<  99  4.3   |  23  |  0.90    Ca    9.8      09 Jan 2020 18:28  Phos  2.5     01-09  Mg     2.1     01-09    Activated Partial Thromboplastin Time: 45.1 SEC (01.09.20 @ 18:38)  Prothrombin Time, Plasma: 10.6 SEC (01.09.20 @ 18:38)  INR: 0.96 (01.09.20 @ 18:38)    TPro  8.0  /  Alb  4.9  /  TBili  0.5  /  DBili  x   /  AST  74<H>  /  ALT  52<H>  /  AlkPhos  63  01-09  Lipase, Serum: 39.6 U/L (01.09.20 @ 18:28)    Toxicology Screen, Drugs of Abuse, Urine (01.10.20 @ 01:50)    Phencyclidine Level, Urine: NEGATIVE    Amphetamine, Urine: NEGATIVE    Barbiturates Screen, Urine: NEGATIVE    Benzodiazepine, Urine: POSITIVE    Cannabinoids, Urine: NEGATIVE    Cocaine Metabolite, Urine: NEGATIVE    Methadone, Urine: NEGATIVE    Opiate, Urine: NEGATIVE    Oxycodone, Urine: NEGATIVE    Toxicology Screen, Drugs of Abuse, Serum (01.09.20 @ 21:50)    Acetaminophen Level, Serum: < 15.0 ug/mL    Salicylate Level, Serum: < 5.0 mg/dL    Ethanol, Whole Blood: < 10 mg/dL    Troponin T, High Sensitivity: 8 ng/L (01.09.20 @ 21:50)  Troponin T, High Sensitivity: 9 ng/L (01.09.20 @ 18:28)    Creatine Kinase, Serum: 940 u/L (01.09.20 @ 21:50)    CXR personally reviewed - no focal consolidations    EKG personally reviewed - 93bpm NSR, QTc 455ms

## 2020-01-09 NOTE — H&P ADULT - NEUROLOGICAL DETAILS
alert and oriented x 3/responds to verbal commands/sensation intact/no spontaneous movement/normal strength alert and oriented x 3/responds to verbal commands/sensation intact/normal strength

## 2020-01-09 NOTE — H&P ADULT - NEGATIVE CARDIOVASCULAR SYMPTOMS
no palpitations/no dyspnea on exertion/no orthopnea no palpitations/no dyspnea on exertion/no orthopnea/no peripheral edema

## 2020-01-09 NOTE — H&P ADULT - NEGATIVE GASTROINTESTINAL SYMPTOMS
no nausea/no vomiting/no diarrhea/no constipation/no abdominal pain no nausea/no vomiting/no diarrhea/no constipation/no abdominal pain/no melena

## 2020-01-09 NOTE — ED ADULT NURSE NOTE - CHIEF COMPLAINT QUOTE
c/o " withdrawing from alcohol" no tremors noted, denies HA, pt reports had thoughts of hurting himself earlier while arguing with girlfriend. denies having SI, HI or AVH at this time.

## 2020-01-09 NOTE — H&P ADULT - ATTENDING COMMENTS
Agree with PA H&P and plan as edited above. CK elevated, patient reports a fall prior to last ED visit in setting of alcohol intoxication (without head trauma or LOC), started on LR 100cc/hr x10 hours, trend CK.  Reports last withdrawal was during last hospital admission 9/2019 however per notes, patient was not withdrawing at that time. Patient depressed, refusing antidepressant or psych eval at this time, denies any current SI/HI.

## 2020-01-09 NOTE — H&P ADULT - NSHPPHYSICALEXAM_GEN_ALL_CORE
Vital Signs Last 24 Hrs  T(C): 36.8 (09 Jan 2020 23:47), Max: 36.9 (09 Jan 2020 14:51)  T(F): 98.2 (09 Jan 2020 23:47), Max: 98.5 (09 Jan 2020 14:51)  HR: 74 (09 Jan 2020 23:47) (74 - 109)  BP: 121/51 (09 Jan 2020 23:47) (121/51 - 164/101)  BP(mean): --  RR: 15 (09 Jan 2020 23:47) (15 - 18)  SpO2: 100% (09 Jan 2020 23:47) (98% - 100%)

## 2020-01-09 NOTE — ED PROVIDER NOTE - RAPID ASSESSMENT
Medhat Gayle DO: asked by triage to assess patient. patient last drink 2 days ago in acute etoh withdrawal with history of withdrawal in the past. was recently seen in ED. patient in acute withdrawal. ordered meds and work up and informed charge nurse and triage nurse.

## 2020-01-09 NOTE — H&P ADULT - PROBLEM SELECTOR PLAN 1
Cardiac monitor.  F/U CE, EKG, A1C, FLP, A1C.  Give O2, ASA, ACE, Fenofibrate.  Hold statin due to elevated LFT's Cardiac monitor.  F/U CE, EKG, A1C, FLP, TSH.  Give O2, ASA, ACE, Fenofibrate.  Hold statin due to elevated LFT's CIWA monitoring, chlordiazepoxide taper  SW eval.

## 2020-01-09 NOTE — H&P ADULT - PROBLEM SELECTOR PLAN 8
VTE With Heparin Sub cut BiD.  Fall, Aspiration, safety and seizure precautions. 1.  Name of PCP: Dr. Kehinde Pavon   2.  PCP Contacted on Admission: [ ] Y    [X] N  - admitted overnight  3.  PCP contacted at Discharge: [ ] Y    [ ] N    [ ] N/A  4.  Post-Discharge Appointment Date and Location:  5.  Summary of Handoff given to PCP:

## 2020-01-09 NOTE — ED PROVIDER NOTE - CLINICAL SUMMARY MEDICAL DECISION MAKING FREE TEXT BOX
59 y.o male pmhx of HTN , ETOH abuse here with symptoms of ETOH withdrawal, states felt shaky and chest discomfort that is typical for him when he withdraws. Will check labs, xray chest, trop, meds, fluids and admit.

## 2020-01-09 NOTE — H&P ADULT - MUSCULOSKELETAL
details… detailed exam normal strength/no joint swelling/no joint erythema/no joint warmth/no calf tenderness

## 2020-01-09 NOTE — H&P ADULT - PROBLEM SELECTOR PLAN 3
F/U LFT's. F/U LFT's.  Statin on hold for now. Transaminitis likely secondary to alcohol abuse  monitor

## 2020-01-09 NOTE — ED ADULT TRIAGE NOTE - TEMPERATURE IN FAHRENHEIT (DEGREES F)
Assessment    1  Prostate cancer (185) (C61)   2  Lower urinary tract symptoms (LUTS) (788 99) (R39 9)    Plan  Lower urinary tract symptoms (LUTS)    · Measure Post Void Residual - POC; Status:Active - Perform Order; Requestedfor:[id number];    Perform: In Office; [phone] [id number]; Ordered; For:Lower urinary tract symptoms (LUTS); Ordered By:Britany Gallegos;  Prostate cancer    · (1) PSA, DIAGNOSTIC (FOLLOW-UP); Status:Active; Requested for:[id number]; Perform:[facility]; KYN:[id number];SPUXECC;KBPWGJ; Ordered By:Leanna Gallegos;   · Follow-up visit in 6 months Evaluation and Treatment  Follow-up  Status: Complete -Retrospective Authorization  Done: [id number]   Ordered;Prostate cancer; Ordered By: Shubham Mcdaniel Performed:  Due: [id number]; Last Updated By: Valerie Butler; 11/20/2017 11:04:45 AM    Discussion/Summary  Discussion Summary:   1  Azam 7 prostate cancer status post external beam radiation (1/9/17) managed by Dr Leanna Galaviz with the patient that his current vesna is 0 6  We will continue to monitor closely  Patient will follow-up in 6 months with a PSA prior to visit for continued surveillance  He continues to follow with radiation oncology as well   lower urinary tract symptomsReviewed the importance of proper hydration and avoidance of bladder irritants  Discussed increasing tamsulosin to twice a day  Patient was instructed to contact us should his urinary symptoms become worse  Discussed possibility of anticholinergic the future should his urgency and frequency become more significant        Chief Complaint  Chief Complaint Free Text Note Form: Patient presents for prostate cancer s/p XRT  = 0 6 (10/4/17)      History of Present Illness  HPI: Jean Murray is a 53-year-old male patient of Dr Shu Andres with a history of Carlos 7 low-volume prostate cancer status post external beam radiation (1/9/17) presenting for 6 month follow-up   was previously under the care of at [facility]  He has a positive family history of prostate cancer in his father who was diagnosed in his late 76s  Patientâs PSA was 4 9 (1/12/16) which prompted his prostate biopsy  Pathology review at [facility] confirmed 2 foci of Azam 7 disease  He had since elected to proceed with Ramon Pae prostatectomy (8/4/16)  Surgery had to be aborted secondary to difficulty with ventilation while in the Trendelenburg position  Patient has since completed external beam radiation therapy on 1/9/17  most recent PSA of 0 6 (10/6/17)  does have some irritative lower urinary tract symptoms despite daily tamsulosin  He notices some urgency, morning hesitancy, and not always feeling empty after urination  He denies any nocturia, dysuria, incontinence, or hematuria  Review of Systems  Complete-Male Urology:  Constitutional: No fever or chills, feels well, no tiredness, no recent weight gain or weight loss  Respiratory: No complaints of shortness of breath, no wheezing, no cough, no SOB on exertion, no orthopnea or PND  Cardiovascular: No complaints of slow heart rate, no fast heart rate, no chest pain, no palpitations, no leg claudication, no lower extremity  Gastrointestinal: No complaints of abdominal pain, no constipation, no nausea or vomiting, no diarrhea or bloody stools  Genitourinary: frequency,-- feelings of urinary urgency-- (occasional )-- and-- stream quality fair, but-- no dysuria,-- no hematuria,-- no empty sensation-- (not always ),-- no incontinence-- and-- no nocturia--   The patient presents with complaints of occasional episodes of urinary hesitancy  Musculoskeletal: No complaints of arthralgia, no myalgias, no joint swelling or stiffness, no limb pain or swelling  Integumentary: No complaints of skin rash or skin lesions, no itching, no skin wound, no dry skin  Hematologic/Lymphatic: No complaints of swollen glands, no swollen glands in the neck, does not bleed easily, no easy bruising    Neurological: No compliants of headache, no confusion, no convulsions, no numbness or tingling, no dizziness or fainting, no limb weakness, no difficulty walking  ROS Reviewed:   ROS reviewed  Active Problems  1  Acid reflux (530 81) (K21 9)   2  Arthritis (716 90) (M19 90)   3  Asthma (493 90) (J45 909)   4  Benign essential hypertension (401 1) (I10)   5  Blood tests prior to treatment or procedure (V72 63) (Z01 812)   6  Bowel disease (569 9) (K63 9)   7  Diabetes mellitus (250 00) (E11 9)   8  Helicobacter pylori (H  pylori) infection (041 86) (A04 8)   9  History of back problems   10  Hypertriglyceridemia (272 1) (E78 1)   11  Morbid obesity (278 01) (E66 01)   12  Obesity (BMI 30-39 9) (278 00) (E66 9)   13  Other constipation (564 09) (K59 09)   14  Postgastrectomy malabsorption (579 3) (K91 2,Z90 3)   15  Prostate cancer (185) (C61)   16  Sleep apnea (780 57) (G47 30)   17  Status post gastric bypass for obesity (V45 86) (C30 03)    Past Medical History  1  History of radiation therapy (V15 3) (Z92 3)  Active Problems And Past Medical History Reviewed: The active problems and past medical history were reviewed and updated today  Surgical History    1  History of Gastrectomy Sleeve   2  History of Gastric Surgery For Morbid Obesity Bypass With Nick-en-Y   3  History of Laparoscopy (Diagnostic)   4  History of Prostate Place Interstitial Dev For Radiation Guide Multiple  Surgical History Reviewed: The surgical history was reviewed and updated today  Family History  Mother    1  Family history of hypertension (V17 49) (Z82 49)  Father    2  Family history of gout (V18 19) (Z82 69)   3  Family history of malignant neoplasm of prostate (V16 42) (Z80 45)  Family History Reviewed: The family history was reviewed and updated today         Social History     · Denied: History of Alcohol use   · Assistive Devices: Cane   ·    · Former smoker (H77 66) (U68 790)   · No alcohol use   · No drug use  Social History Reviewed: The social history was reviewed and is unchanged  Current Meds   1  Albuterol-Ipratropium 103-18 MCG/ACT AERO; INHALE 2 PUFFS Every 6 hours PRN; Therapy: (Recorded:[id number]) to Recorded   2  B Complex TABS; Therapy: (Recorded:31Oct2016) to Recorded   3  Calcium CHEW; Therapy: (Kenzie Birmingham) to Recorded   4  Lisinopril 10 MG Oral Tablet; TAKE 1 TABLET DAILY; Therapy: (Rogers Benson) to Recorded   5  Multiple Vitamin CHEW; Therapy: (Recorded:29Jun2017) to Recorded   6  Singulair 10 MG Oral Tablet; Therapy: (Recorded:31Oct2016) to Recorded   7  Spiriva HandiHaler 18 MCG Inhalation Capsule; INHALE CONTENTS OF 1 CAPSULE ONCE DAILY; Therapy: (Rogers Benson) to Recorded   8  Tamsulosin HCl - 0 4 MG Oral Capsule; take 1 capsule daily; Therapy: [id number] to (Evaluate:83Ass5574)  Requested for: [id number]; Last Rx:[id number] Ordered   9  Tricor TABS; TAKE 145 MG Daily; Therapy: (Rogers Benson) to Recorded   10  Vitamin C CAPS; Therapy: (Recorded:31Oct2016) to Recorded  Medication List Reviewed: The medication list was reviewed and updated today  Allergies  1  No Known Drug Allergies  2  Seasonal    Vitals  Vital Signs    Recorded: 20Nov2017 10:33AM   Heart Rate 56   Systolic 738   Diastolic 80   Height 5 ft 10 in   Weight 246 lb    BMI Calculated 35 3   BSA Calculated 2 28       Physical Exam   Constitutional  General appearance: No acute distress, well appearing and well nourished  Pulmonary  Respiratory effort: No increased work of breathing or signs of respiratory distress  Cardiovascular  Examination of extremities for edema and/or varicosities: Normal    Musculoskeletal ambulates with cane assistance  Skin  Skin and subcutaneous tissue: Normal without rashes or lesions  Additional Exam:  no focal neurologic deficits  Mood and affect appropriate        Future Appointments    Date/Time Provider Specialty Site   05/29/2018 10:30 AM Donn Hurst Urology [facility] Irl Quails   01/11/2018 10:30 AM Stephy Mchugh, [facility]       Signatures   Electronically signed by : Tiara Gonsalez, ; Nov 20 2017 12:03PM EST                       (Author)    Electronically signed by : ZEINAB Woods ; Nov 20 2017 12:31PM EST                       (Author) 98.5

## 2020-01-10 DIAGNOSIS — F10.239 ALCOHOL DEPENDENCE WITH WITHDRAWAL, UNSPECIFIED: ICD-10-CM

## 2020-01-10 DIAGNOSIS — F32.9 MAJOR DEPRESSIVE DISORDER, SINGLE EPISODE, UNSPECIFIED: ICD-10-CM

## 2020-01-10 DIAGNOSIS — F10.10 ALCOHOL ABUSE, UNCOMPLICATED: ICD-10-CM

## 2020-01-10 LAB
ALBUMIN SERPL ELPH-MCNC: 3.9 G/DL — SIGNIFICANT CHANGE UP (ref 3.3–5)
ALP SERPL-CCNC: 54 U/L — SIGNIFICANT CHANGE UP (ref 40–120)
ALT FLD-CCNC: 38 U/L — SIGNIFICANT CHANGE UP (ref 4–41)
AMPHET UR-MCNC: NEGATIVE — SIGNIFICANT CHANGE UP
ANION GAP SERPL CALC-SCNC: 14 MMO/L — SIGNIFICANT CHANGE UP (ref 7–14)
AST SERPL-CCNC: 47 U/L — HIGH (ref 4–40)
BARBITURATES UR SCN-MCNC: NEGATIVE — SIGNIFICANT CHANGE UP
BENZODIAZ UR-MCNC: POSITIVE — SIGNIFICANT CHANGE UP
BILIRUB SERPL-MCNC: 0.7 MG/DL — SIGNIFICANT CHANGE UP (ref 0.2–1.2)
BUN SERPL-MCNC: 12 MG/DL — SIGNIFICANT CHANGE UP (ref 7–23)
CALCIUM SERPL-MCNC: 8.9 MG/DL — SIGNIFICANT CHANGE UP (ref 8.4–10.5)
CANNABINOIDS UR-MCNC: NEGATIVE — SIGNIFICANT CHANGE UP
CHLORIDE SERPL-SCNC: 104 MMOL/L — SIGNIFICANT CHANGE UP (ref 98–107)
CHOLEST SERPL-MCNC: 213 MG/DL — HIGH (ref 120–199)
CK SERPL-CCNC: 749 U/L — HIGH (ref 30–200)
CO2 SERPL-SCNC: 23 MMOL/L — SIGNIFICANT CHANGE UP (ref 22–31)
COCAINE METAB.OTHER UR-MCNC: NEGATIVE — SIGNIFICANT CHANGE UP
CREAT SERPL-MCNC: 0.79 MG/DL — SIGNIFICANT CHANGE UP (ref 0.5–1.3)
GLUCOSE SERPL-MCNC: 108 MG/DL — HIGH (ref 70–99)
HBA1C BLD-MCNC: 6.2 % — HIGH (ref 4–5.6)
HCT VFR BLD CALC: 42.4 % — SIGNIFICANT CHANGE UP (ref 39–50)
HDLC SERPL-MCNC: 54 MG/DL — SIGNIFICANT CHANGE UP (ref 35–55)
HGB BLD-MCNC: 14.1 G/DL — SIGNIFICANT CHANGE UP (ref 13–17)
LIPID PNL WITH DIRECT LDL SERPL: 145 MG/DL — SIGNIFICANT CHANGE UP
MAGNESIUM SERPL-MCNC: 2 MG/DL — SIGNIFICANT CHANGE UP (ref 1.6–2.6)
MCHC RBC-ENTMCNC: 29.4 PG — SIGNIFICANT CHANGE UP (ref 27–34)
MCHC RBC-ENTMCNC: 33.3 % — SIGNIFICANT CHANGE UP (ref 32–36)
MCV RBC AUTO: 88.3 FL — SIGNIFICANT CHANGE UP (ref 80–100)
METHADONE UR-MCNC: NEGATIVE — SIGNIFICANT CHANGE UP
NRBC # FLD: 0 K/UL — SIGNIFICANT CHANGE UP (ref 0–0)
OPIATES UR-MCNC: NEGATIVE — SIGNIFICANT CHANGE UP
OXYCODONE UR-MCNC: NEGATIVE — SIGNIFICANT CHANGE UP
PCP UR-MCNC: NEGATIVE — SIGNIFICANT CHANGE UP
PHOSPHATE SERPL-MCNC: 3 MG/DL — SIGNIFICANT CHANGE UP (ref 2.5–4.5)
PLATELET # BLD AUTO: 170 K/UL — SIGNIFICANT CHANGE UP (ref 150–400)
PMV BLD: 10.2 FL — SIGNIFICANT CHANGE UP (ref 7–13)
POTASSIUM SERPL-MCNC: 3.5 MMOL/L — SIGNIFICANT CHANGE UP (ref 3.5–5.3)
POTASSIUM SERPL-SCNC: 3.5 MMOL/L — SIGNIFICANT CHANGE UP (ref 3.5–5.3)
PROT SERPL-MCNC: 6.6 G/DL — SIGNIFICANT CHANGE UP (ref 6–8.3)
RBC # BLD: 4.8 M/UL — SIGNIFICANT CHANGE UP (ref 4.2–5.8)
RBC # FLD: 13.5 % — SIGNIFICANT CHANGE UP (ref 10.3–14.5)
SODIUM SERPL-SCNC: 141 MMOL/L — SIGNIFICANT CHANGE UP (ref 135–145)
TRIGL SERPL-MCNC: 169 MG/DL — HIGH (ref 10–149)
TSH SERPL-MCNC: 2.61 UIU/ML — SIGNIFICANT CHANGE UP (ref 0.27–4.2)
VIT B12 SERPL-MCNC: 1040 PG/ML — HIGH (ref 200–900)
WBC # BLD: 6.3 K/UL — SIGNIFICANT CHANGE UP (ref 3.8–10.5)
WBC # FLD AUTO: 6.3 K/UL — SIGNIFICANT CHANGE UP (ref 3.8–10.5)

## 2020-01-10 PROCEDURE — 99223 1ST HOSP IP/OBS HIGH 75: CPT

## 2020-01-10 PROCEDURE — 12345: CPT | Mod: NC

## 2020-01-10 RX ORDER — SODIUM CHLORIDE 9 MG/ML
1000 INJECTION, SOLUTION INTRAVENOUS
Refills: 0 | Status: DISCONTINUED | OUTPATIENT
Start: 2020-01-10 | End: 2020-01-12

## 2020-01-10 RX ORDER — MIRTAZAPINE 45 MG/1
15 TABLET, ORALLY DISINTEGRATING ORAL AT BEDTIME
Refills: 0 | Status: DISCONTINUED | OUTPATIENT
Start: 2020-01-10 | End: 2020-01-13

## 2020-01-10 RX ADMIN — Medication 1 TABLET(S): at 11:54

## 2020-01-10 RX ADMIN — SODIUM CHLORIDE 100 MILLILITER(S): 9 INJECTION, SOLUTION INTRAVENOUS at 02:38

## 2020-01-10 RX ADMIN — PANTOPRAZOLE SODIUM 40 MILLIGRAM(S): 20 TABLET, DELAYED RELEASE ORAL at 06:37

## 2020-01-10 RX ADMIN — BUDESONIDE AND FORMOTEROL FUMARATE DIHYDRATE 2 PUFF(S): 160; 4.5 AEROSOL RESPIRATORY (INHALATION) at 21:59

## 2020-01-10 RX ADMIN — Medication 50 MILLIGRAM(S): at 06:37

## 2020-01-10 RX ADMIN — Medication 50 MILLIGRAM(S): at 12:29

## 2020-01-10 RX ADMIN — Medication 1 MILLIGRAM(S): at 11:54

## 2020-01-10 RX ADMIN — SODIUM CHLORIDE 3 MILLILITER(S): 9 INJECTION INTRAMUSCULAR; INTRAVENOUS; SUBCUTANEOUS at 21:58

## 2020-01-10 RX ADMIN — MIRTAZAPINE 15 MILLIGRAM(S): 45 TABLET, ORALLY DISINTEGRATING ORAL at 23:34

## 2020-01-10 RX ADMIN — Medication 81 MILLIGRAM(S): at 12:29

## 2020-01-10 RX ADMIN — MONTELUKAST 10 MILLIGRAM(S): 4 TABLET, CHEWABLE ORAL at 11:54

## 2020-01-10 RX ADMIN — HEPARIN SODIUM 5000 UNIT(S): 5000 INJECTION INTRAVENOUS; SUBCUTANEOUS at 06:37

## 2020-01-10 RX ADMIN — BUDESONIDE AND FORMOTEROL FUMARATE DIHYDRATE 2 PUFF(S): 160; 4.5 AEROSOL RESPIRATORY (INHALATION) at 09:26

## 2020-01-10 RX ADMIN — Medication 100 MILLIGRAM(S): at 11:54

## 2020-01-10 RX ADMIN — LISINOPRIL 5 MILLIGRAM(S): 2.5 TABLET ORAL at 06:37

## 2020-01-10 RX ADMIN — SODIUM CHLORIDE 3 MILLILITER(S): 9 INJECTION INTRAMUSCULAR; INTRAVENOUS; SUBCUTANEOUS at 13:11

## 2020-01-10 RX ADMIN — Medication 50 MILLIGRAM(S): at 18:14

## 2020-01-10 RX ADMIN — SODIUM CHLORIDE 3 MILLILITER(S): 9 INJECTION INTRAMUSCULAR; INTRAVENOUS; SUBCUTANEOUS at 06:39

## 2020-01-10 RX ADMIN — Medication 50 MILLIGRAM(S): at 00:40

## 2020-01-10 NOTE — BEHAVIORAL HEALTH ASSESSMENT NOTE - NSBHCONSULTFOLLOWDETAILS_PSY_A_CORE FT
monitor response to medication Pt requires futher monitoring for withdrawal in the hospital, but from a psychiatric perspective he does not represent an acute danger to himself or anyone else at this time.

## 2020-01-10 NOTE — BEHAVIORAL HEALTH ASSESSMENT NOTE - SUICIDE PROTECTIVE FACTORS
Identifies reasons for living/Has future plans/Supportive social network of family or friends/Cultural, spiritual and/or moral attitudes against suicide

## 2020-01-10 NOTE — PROGRESS NOTE ADULT - ASSESSMENT
59M admitted for chest pain, elevated LFT's  and ETOH withdrawal  psych consulted for depression, benzo abuse  EKG prolonged QT 59M admitted for chest pain, elevated LFT's  and ETOH withdrawal  psych consulted for depression, benzo abuse  EKG w prolonged QT

## 2020-01-10 NOTE — BEHAVIORAL HEALTH ASSESSMENT NOTE - NSBHADMITCOUNSEL_PSY_A_CORE
discussed with patient the risks/benefits/side effects/alternatives to treatment with remeron./risks and benefits of treatment options/instructions for management, treatment and follow up/importance of adherence to chosen treatment/risk factor reduction/client/family/caregiver education/prognosis

## 2020-01-10 NOTE — BEHAVIORAL HEALTH ASSESSMENT NOTE - CASE SUMMARY
Patient seen/evaluated with NP, agree with above. In brief pt is a 60 y/o Male, with a PmHx of Asthma, ETOH Abuse, HTN, HLD, Gerd, admitted for chest pain and ETOH withdrawal.  Reports undiagnosed anxiety and has been given valium from a MD in [facility] 5 years ago which he still owns (used last 2 10mg tabs today).  Patient binge drinks ETOH (about 1 pint per day of vodka) a few times per month.  Also uses valium PRN for anxiety - patient reporting taking about 20 pills per month. Endorses depressive sx including depressed mood, hopelessness, withdrawn, isolative, anhedonia. No SI/HI, no psychotic sx. Impression: EtOH withdrawal; EtOH abuse; depressive disorder- r/o substance-induced depressive d/o, r/o MDD. Plan: as above- c/w Librium taper as above, CIWA, thiamine/folate/MVI. Start Remeron to target depressive sx. SW can meet with pt to discuss substance rehab options for after discharge, pt currently stating he is interested in rehab. Will follow.

## 2020-01-10 NOTE — PROGRESS NOTE ADULT - SUBJECTIVE AND OBJECTIVE BOX
[facility] Division of Hospital Medicine  Liz Schneider MD  Pager [phone]    Patient is a 59y old  Male who presents with a chief complaint of Chest pain x 1 day (09 Jan 2020 22:12)      SUBJECTIVE / OVERNIGHT EVENTS: pt seen at 145p- said he was here for detox- is depressed, tearful  said he's been taking benzo's at home- old valiums- agreeable to talking to Psychiatry  ADDITIONAL REVIEW OF SYSTEMS:    MEDICATIONS  (STANDING):  aspirin enteric coated 81 milliGRAM(s) Oral daily  budesonide 160 MICROgram(s)/formoterol 4.5 MICROgram(s) Inhaler 2 Puff(s) Inhalation two times a day  chlordiazePOXIDE   Oral   chlordiazePOXIDE 50 milliGRAM(s) Oral every 6 hours  folic acid 1 milliGRAM(s) Oral daily  heparin  Injectable 5000 Unit(s) SubCutaneous every 12 hours  lactated ringers. 1000 milliLiter(s) (100 mL/Hr) IV Continuous <Continuous>  lisinopril 5 milliGRAM(s) Oral daily  montelukast 10 milliGRAM(s) Oral daily  multivitamin 1 Tablet(s) Oral daily  pantoprazole    Tablet 40 milliGRAM(s) Oral before breakfast  sodium chloride 0.9% lock flush 3 milliLiter(s) IV Push every 8 hours  thiamine 100 milliGRAM(s) Oral daily    MEDICATIONS  (PRN):  ALBUTerol    90 MICROgram(s) HFA Inhaler 2 Puff(s) Inhalation every 6 hours PRN Shortness of Breath and/or Wheezing      CAPILLARY BLOOD GLUCOSE        I&O's Summary      PHYSICAL EXAM:  Vital Signs Last 24 Hrs  T(C): 36.7 (10 Greg 2020 16:10), Max: 37 (10 Greg 2020 10:31)  T(F): 98 (10 Greg 2020 16:10), Max: 98.6 (10 Greg 2020 10:31)  HR: 55 (10 Greg 2020 16:10) (55 - 100)  BP: 118/68 (10 Greg 2020 16:10) (118/68 - 164/101)  BP(mean): --  RR: 16 (10 Greg 2020 16:10) (15 - 18)  SpO2: 100% (10 Greg 2020 16:10) (100% - 100%)  CONSTITUTIONAL: NAD  RESPIRATORY: Normal respiratory effort; lungs are clear to auscultation bilaterally  CARDIOVASCULAR: Regular rate and rhythm, normal S1 and S2, no murmur/rub/gallop; No lower extremity edema;   ABDOMEN: Nontender to palpation, normoactive bowel sounds, not distended;   MUSCULOSKELETAL:  Normal gait; no clubbing or cyanosis of digits; no joint swelling or tenderness to palpation  PSYCH: A+O to person, place, and time; affect appropriate      LABS:                        14.1   6.30  )-----------( 170      ( 10 Greg 2020 04:20 )             42.4     01-10    141  |  104  |  12  ----------------------------<  108<H>  3.5   |  23  |  0.79    Ca    8.9      10 Greg 2020 04:20  Phos  3.0     01-10  Mg     2.0     01-10    TPro  6.6  /  Alb  3.9  /  TBili  0.7  /  DBili  x   /  AST  47<H>  /  ALT  38  /  AlkPhos  54  01-10    PT/INR - ( 09 Jan 2020 18:38 )   PT: 10.6 SEC;   INR: 0.96          PTT - ( 09 Jan 2020 18:38 )  PTT:45.1 SEC  CARDIAC MARKERS ( 10 Greg 2020 04:20 )  x     / x     / 749 u/L / x     / x      CARDIAC MARKERS ( 09 Jan 2020 21:50 )  x     / x     / 940 u/L / 3.00 ng/mL / x                RADIOLOGY & ADDITIONAL TESTS:  Results Reviewed:   Imaging Personally Reviewed:  Electrocardiogram Personally Reviewed:    COORDINATION OF CARE:  Care Discussed with Consultants/Other Providers [Y/N]:  Prior or Outpatient Records Reviewed [Y/N]: [facility] Division of Hospital Medicine  Liz Schneider MD  Pager [phone]    Patient is a 59y old  Male who presents with a chief complaint of Chest pain x 1 day (09 Jan 2020 22:12)      SUBJECTIVE / OVERNIGHT EVENTS: pt seen at 145p- said he was here for detox- is depressed, tearful  said he's been taking benzo's at home- old valiums- agreeable to talking to Psychiatry  ADDITIONAL REVIEW OF SYSTEMS:    MEDICATIONS  (STANDING):  aspirin enteric coated 81 milliGRAM(s) Oral daily  budesonide 160 MICROgram(s)/formoterol 4.5 MICROgram(s) Inhaler 2 Puff(s) Inhalation two times a day  chlordiazePOXIDE   Oral   chlordiazePOXIDE 50 milliGRAM(s) Oral every 6 hours  folic acid 1 milliGRAM(s) Oral daily  heparin  Injectable 5000 Unit(s) SubCutaneous every 12 hours  lactated ringers. 1000 milliLiter(s) (100 mL/Hr) IV Continuous <Continuous>  lisinopril 5 milliGRAM(s) Oral daily  montelukast 10 milliGRAM(s) Oral daily  multivitamin 1 Tablet(s) Oral daily  pantoprazole    Tablet 40 milliGRAM(s) Oral before breakfast  sodium chloride 0.9% lock flush 3 milliLiter(s) IV Push every 8 hours  thiamine 100 milliGRAM(s) Oral daily    MEDICATIONS  (PRN):  ALBUTerol    90 MICROgram(s) HFA Inhaler 2 Puff(s) Inhalation every 6 hours PRN Shortness of Breath and/or Wheezing      CAPILLARY BLOOD GLUCOSE        I&O's Summary      PHYSICAL EXAM:  Vital Signs Last 24 Hrs  T(C): 36.7 (10 Greg 2020 16:10), Max: 37 (10 Greg 2020 10:31)  T(F): 98 (10 Greg 2020 16:10), Max: 98.6 (10 Greg 2020 10:31)  HR: 55 (10 Greg 2020 16:10) (55 - 100)  BP: 118/68 (10 Greg 2020 16:10) (118/68 - 164/101)  BP(mean): --  RR: 16 (10 Greg 2020 16:10) (15 - 18)  SpO2: 100% (10 Greg 2020 16:10) (100% - 100%)  CONSTITUTIONAL: NAD intermittently tearful  RESPIRATORY: Normal respiratory effort; lungs are clear to auscultation bilaterally  CARDIOVASCULAR: Regular rate and rhythm, normal S1 and S2, no murmur/rub/gallop; No lower extremity edema;   ABDOMEN: Nontender to palpation, normoactive bowel sounds, not distended;   MUSCULOSKELETAL:  Normal gait; no clubbing or cyanosis of digits; no joint swelling or tenderness to palpation  PSYCH: A+O to person, place, and time; affect appropriate      LABS:                        14.1   6.30  )-----------( 170      ( 10 Greg 2020 04:20 )             42.4     01-10    141  |  104  |  12  ----------------------------<  108<H>  3.5   |  23  |  0.79    Ca    8.9      10 Greg 2020 04:20  Phos  3.0     01-10  Mg     2.0     01-10    TPro  6.6  /  Alb  3.9  /  TBili  0.7  /  DBili  x   /  AST  47<H>  /  ALT  38  /  AlkPhos  54  01-10    PT/INR - ( 09 Jan 2020 18:38 )   PT: 10.6 SEC;   INR: 0.96          PTT - ( 09 Jan 2020 18:38 )  PTT:45.1 SEC  CARDIAC MARKERS ( 10 Greg 2020 04:20 )  x     / x     / 749 u/L / x     / x      CARDIAC MARKERS ( 09 Jan 2020 21:50 )  x     / x     / 940 u/L / 3.00 ng/mL / x                RADIOLOGY & ADDITIONAL TESTS:  Results Reviewed:   Imaging Personally Reviewed:  Electrocardiogram Personally Reviewed:    COORDINATION OF CARE:  Care Discussed with Consultants/Other Providers [Y/N]:  Prior or Outpatient Records Reviewed [Y/N]:

## 2020-01-10 NOTE — PATIENT PROFILE ADULT - STATED REASON FOR ADMISSION
History obtained from chart pt on CIWA protocol and lethargic currently. Pt admitted due to tremors anxiety, intermittent chest pain. As per pt he drank entire bottle of Vodka 40 hours prior to admission.

## 2020-01-10 NOTE — BEHAVIORAL HEALTH ASSESSMENT NOTE - DETAILS
passive SI in the past however pt denies at current, no active plan of hx of SA nausea, tremors mild chest pain

## 2020-01-10 NOTE — PROGRESS NOTE ADULT - PROBLEM SELECTOR PLAN 8
1.  Name of PCP: Dr. Kehinde Pavon   2.  PCP Contacted on Admission: [ ] Y    [X] N  - admitted overnight  3.  PCP contacted at Discharge: [ ] Y    [ ] N    [ ] N/A  4.  Post-Discharge Appointment Date and Location:  5.  Summary of Handoff given to PCP:

## 2020-01-10 NOTE — BEHAVIORAL HEALTH ASSESSMENT NOTE - NSBHCHARTREVIEWLAB_PSY_A_CORE FT
14.1   6.30  )-----------( 170      ( 10 Greg 2020 04:20 )             42.4   01-10    141  |  104  |  12  ----------------------------<  108<H>  3.5   |  23  |  0.79    Ca    8.9      10 Greg 2020 04:20  Phos  3.0     01-10  Mg     2.0     01-10    TPro  6.6  /  Alb  3.9  /  TBili  0.7  /  DBili  x   /  AST  47<H>  /  ALT  38  /  AlkPhos  54  01-10

## 2020-01-10 NOTE — BEHAVIORAL HEALTH ASSESSMENT NOTE - HPI (INCLUDE ILLNESS QUALITY, SEVERITY, DURATION, TIMING, CONTEXT, MODIFYING FACTORS, ASSOCIATED SIGNS AND SYMPTOMS)
Patient is a 60 y/o Male, with a PmHx of Asthma, ETOH Abuse, HTN, HLD, Gerd, admitted for chest pain and ETOH withdrawal.  Patient lives with his girlfriend.  He reports he is  after the death of his wife 9 years ago.  patient also reports around that time, losing his home and his job.  patient additionally tells wrtier of the death of his nephew within these past 9 years.  he states has has no formal psych hx, no inpt admissions, has not seen outpt psychiatrist.  Does state he has undiagnosed anxiety and has been given valium from a MD in [facility] 5 years ago which he still owns (used last 2 10mg tabs today).  Patient binge drinks ETOH (about 1 pint per day of vodka) a few times per month.  He has been in withdrawal before (within the past year) but denies DTs, seizures or ICU stays.  He states if he is tremulous he uses his own valium at home.  Also uses valium PRN for anxiety - patient reporting taking about 20 pills per month. Denies other substance use.   Psychiatry consulted for benzo use, etoh withdrawal and depression.  Patient was seen and assessed at bedside.  patient is alert and oriented. calm and cooperative.  patient very well educated, well spoken.  he reports he is depressed and states his symptoms include hopelessness, withdrawn, isolative, anhedonia.  Patient also discusses insomnia saying he sleeps 2-3 hours per night. Patient feels he has been experiencing depression for many years" thinking I could beat it, but I cant".  Patient has been hesitant to take antidepressant medication as he knows a man who took Wellbutrin and committed suicide.  patient denies having current SI or HI.  He states at times in the past few years he has passive SI  but denies creating plan or having active thoughts.  his protective factor is his [employer].  He does speak of the future stating he applies for jobs, but feels discouraged.  Patient is without psychosis, denies a hx of psychosis, no marlo reported.  At current patient is tremulous with tongue fasciculations. Scoring 1 for tremors however patient also voicing nausea at this time which would increase his score to has high as 5.  He is open to receiving help.  Patient willing to discuss antidepressant medication and rehab serviced for substance use.

## 2020-01-10 NOTE — PROGRESS NOTE ADULT - PROBLEM SELECTOR PLAN 1
CIWA monitoring, chlordiazepoxide taper  SW eval. CIWA monitoring, chlordiazepoxide taper  SW eval. pt interested in rehab after d/c  psych consulted for evaluation of depression/anxiety that pt is self treating w benzodiazepines from street.

## 2020-01-10 NOTE — BEHAVIORAL HEALTH ASSESSMENT NOTE - RISK ASSESSMENT
Low Acute Suicide Risk Risk: substance use, male gender, mood d/o  Protective: Gnosticist, has support from gf, no hx of SA, no formal psych hx or inpt stay, no current si or Hi    patient would benefit from substance treatment  in my opinion does not meet criteria for inpt involuntary admission

## 2020-01-10 NOTE — PROGRESS NOTE ADULT - PROBLEM SELECTOR PLAN 2
Delta troponin negative, low suspicion for ACS  Check A1C, FLP, TSH.  c/w ASA, ACE-i  Hold statin due to elevated LFTs, add back fenofibrate pending lipid panel (reportedly self d/c'ed)    #Elevated CK, likely secondary to rhabdo secondary to recent fall  -LR, trend CK, if downtrending CK would d/c tele no complaints today.  dc tele  c/w ASA, ACE-i  Hold statin due to elevated LFTs  #Elevated CK, likely secondary to rhabdo secondary to recent fall

## 2020-01-10 NOTE — BEHAVIORAL HEALTH ASSESSMENT NOTE - SUMMARY
Patient is a 58 y/o Male, with a PmHx of Asthma, ETOH Abuse, HTN, HLD, Gerd, admitted for chest pain and ETOH withdrawal.  Patient lives with his girlfriend.  He reports he is  after the death of his wife 9 years ago.  patient also reports around that time, losing his home and his job.  patient additionally tells writer of the death of his nephew within these past 9 years.  he states he has no formal psych hx, no inpt admissions, has not seen outpt psychiatrist.  Does state he has undiagnosed anxiety and has been given valium from a MD in [facility] 5 years ago which he still owns (used last 2 10mg tabs today).  Patient binge drinks ETOH (about 1 pint per day of vodka) a few times per month.  He has been in withdrawal before (within the past year) but denies DTs, seizures or ICU stays.  He states if he is tremulous he uses his own valium at home.  Also uses valium PRN for anxiety - patient reporting taking about 20 pills per month. Denies other substance use. he reports he is depressed and states his symptoms include hopelessness, withdrawn, isolative, anhedonia.  Patient also discusses insomnia saying he sleeps 2-3 hours per night.  no si or hi    PLAN  -c/w libirum 50mg taper for etoh and benzo use/withdrawal  - PRN ativan as per ciwa protocol  - Recommend starting remeron 15mg qhs for mood, insomnia  - [facility] [phone] (M-F 9am-7pm)   [facility] [phone]  -  consult as patient expressed intrest in substance treatment

## 2020-01-10 NOTE — BEHAVIORAL HEALTH ASSESSMENT NOTE - NSBHCHARTREVIEWVS_PSY_A_CORE FT
Vital Signs Last 24 Hrs  T(C): 37 (10 Greg 2020 10:31), Max: 37 (10 Greg 2020 10:31)  T(F): 98.6 (10 Greg 2020 10:31), Max: 98.6 (10 Greg 2020 10:31)  HR: 61 (10 Greg 2020 10:31) (59 - 109)  BP: 118/71 (10 Greg 2020 10:31) (118/71 - 164/101)  BP(mean): --  RR: 17 (10 Greg 2020 10:31) (15 - 18)  SpO2: 100% (10 Greg 2020 10:31) (100% - 100%)

## 2020-01-10 NOTE — PROGRESS NOTE ADULT - SUBJECTIVE AND OBJECTIVE BOX
ACP team PA Note     Discussed with Dr Schneider.   __ Repeat ECG from 1/10/20 at 15:42-- QT's -- 426  -- as per discussion with Dr Schneider, tele monitoring discontinued

## 2020-01-10 NOTE — BEHAVIORAL HEALTH ASSESSMENT NOTE - NSBHCONSULTFOLLOWAFTERCARE_PSY_A_CORE FT
Once medically stable:   [facility] [phone] (M-F 9am-7pm)   [facility] [phone]

## 2020-01-11 LAB
ANION GAP SERPL CALC-SCNC: 9 MMO/L — SIGNIFICANT CHANGE UP (ref 7–14)
APPEARANCE UR: CLEAR — SIGNIFICANT CHANGE UP
BILIRUB UR-MCNC: NEGATIVE — SIGNIFICANT CHANGE UP
BLOOD UR QL VISUAL: NEGATIVE — SIGNIFICANT CHANGE UP
BUN SERPL-MCNC: 10 MG/DL — SIGNIFICANT CHANGE UP (ref 7–23)
CALCIUM SERPL-MCNC: 9.1 MG/DL — SIGNIFICANT CHANGE UP (ref 8.4–10.5)
CHLORIDE SERPL-SCNC: 106 MMOL/L — SIGNIFICANT CHANGE UP (ref 98–107)
CK MB BLD-MCNC: 0.39 — SIGNIFICANT CHANGE UP (ref 0–2.5)
CK MB BLD-MCNC: 1.54 NG/ML — SIGNIFICANT CHANGE UP (ref 1–6.6)
CK SERPL-CCNC: 391 U/L — HIGH (ref 30–200)
CO2 SERPL-SCNC: 23 MMOL/L — SIGNIFICANT CHANGE UP (ref 22–31)
COLOR SPEC: SIGNIFICANT CHANGE UP
CREAT SERPL-MCNC: 0.81 MG/DL — SIGNIFICANT CHANGE UP (ref 0.5–1.3)
GLUCOSE SERPL-MCNC: 112 MG/DL — HIGH (ref 70–99)
GLUCOSE UR-MCNC: NEGATIVE — SIGNIFICANT CHANGE UP
HCT VFR BLD CALC: 42.1 % — SIGNIFICANT CHANGE UP (ref 39–50)
HGB BLD-MCNC: 13.6 G/DL — SIGNIFICANT CHANGE UP (ref 13–17)
KETONES UR-MCNC: NEGATIVE — SIGNIFICANT CHANGE UP
LEUKOCYTE ESTERASE UR-ACNC: NEGATIVE — SIGNIFICANT CHANGE UP
LG PLATELETS BLD QL AUTO: SLIGHT — SIGNIFICANT CHANGE UP
MAGNESIUM SERPL-MCNC: 2 MG/DL — SIGNIFICANT CHANGE UP (ref 1.6–2.6)
MANUAL SMEAR VERIFICATION: SIGNIFICANT CHANGE UP
MCHC RBC-ENTMCNC: 29.4 PG — SIGNIFICANT CHANGE UP (ref 27–34)
MCHC RBC-ENTMCNC: 32.3 % — SIGNIFICANT CHANGE UP (ref 32–36)
MCV RBC AUTO: 91.1 FL — SIGNIFICANT CHANGE UP (ref 80–100)
NITRITE UR-MCNC: NEGATIVE — SIGNIFICANT CHANGE UP
NRBC # FLD: 0 K/UL — SIGNIFICANT CHANGE UP (ref 0–0)
PH UR: 7 — SIGNIFICANT CHANGE UP (ref 5–8)
PLATELET # BLD AUTO: 119 K/UL — LOW (ref 150–400)
PLATELET COUNT - ESTIMATE: SIGNIFICANT CHANGE UP
PMV BLD: 11.1 FL — SIGNIFICANT CHANGE UP (ref 7–13)
POTASSIUM SERPL-MCNC: 3.5 MMOL/L — SIGNIFICANT CHANGE UP (ref 3.5–5.3)
POTASSIUM SERPL-SCNC: 3.5 MMOL/L — SIGNIFICANT CHANGE UP (ref 3.5–5.3)
PROT UR-MCNC: NEGATIVE — SIGNIFICANT CHANGE UP
RBC # BLD: 4.62 M/UL — SIGNIFICANT CHANGE UP (ref 4.2–5.8)
RBC # FLD: 13.2 % — SIGNIFICANT CHANGE UP (ref 10.3–14.5)
SODIUM SERPL-SCNC: 138 MMOL/L — SIGNIFICANT CHANGE UP (ref 135–145)
SP GR SPEC: 1.01 — SIGNIFICANT CHANGE UP (ref 1–1.04)
UROBILINOGEN FLD QL: NORMAL — SIGNIFICANT CHANGE UP
WBC # BLD: 5.15 K/UL — SIGNIFICANT CHANGE UP (ref 3.8–10.5)
WBC # FLD AUTO: 5.15 K/UL — SIGNIFICANT CHANGE UP (ref 3.8–10.5)

## 2020-01-11 PROCEDURE — 99232 SBSQ HOSP IP/OBS MODERATE 35: CPT

## 2020-01-11 RX ADMIN — Medication 50 MILLIGRAM(S): at 14:12

## 2020-01-11 RX ADMIN — BUDESONIDE AND FORMOTEROL FUMARATE DIHYDRATE 2 PUFF(S): 160; 4.5 AEROSOL RESPIRATORY (INHALATION) at 12:40

## 2020-01-11 RX ADMIN — PANTOPRAZOLE SODIUM 40 MILLIGRAM(S): 20 TABLET, DELAYED RELEASE ORAL at 06:34

## 2020-01-11 RX ADMIN — Medication 1 TABLET(S): at 12:40

## 2020-01-11 RX ADMIN — SODIUM CHLORIDE 3 MILLILITER(S): 9 INJECTION INTRAMUSCULAR; INTRAVENOUS; SUBCUTANEOUS at 06:35

## 2020-01-11 RX ADMIN — MIRTAZAPINE 15 MILLIGRAM(S): 45 TABLET, ORALLY DISINTEGRATING ORAL at 21:19

## 2020-01-11 RX ADMIN — Medication 100 MILLIGRAM(S): at 12:40

## 2020-01-11 RX ADMIN — SODIUM CHLORIDE 3 MILLILITER(S): 9 INJECTION INTRAMUSCULAR; INTRAVENOUS; SUBCUTANEOUS at 21:18

## 2020-01-11 RX ADMIN — SODIUM CHLORIDE 3 MILLILITER(S): 9 INJECTION INTRAMUSCULAR; INTRAVENOUS; SUBCUTANEOUS at 14:00

## 2020-01-11 RX ADMIN — Medication 50 MILLIGRAM(S): at 21:19

## 2020-01-11 RX ADMIN — LISINOPRIL 5 MILLIGRAM(S): 2.5 TABLET ORAL at 06:34

## 2020-01-11 RX ADMIN — Medication 1 MILLIGRAM(S): at 12:40

## 2020-01-11 RX ADMIN — BUDESONIDE AND FORMOTEROL FUMARATE DIHYDRATE 2 PUFF(S): 160; 4.5 AEROSOL RESPIRATORY (INHALATION) at 21:20

## 2020-01-11 RX ADMIN — Medication 50 MILLIGRAM(S): at 06:34

## 2020-01-11 RX ADMIN — MONTELUKAST 10 MILLIGRAM(S): 4 TABLET, CHEWABLE ORAL at 12:40

## 2020-01-11 RX ADMIN — Medication 81 MILLIGRAM(S): at 12:40

## 2020-01-11 NOTE — PROGRESS NOTE ADULT - PROBLEM SELECTOR PLAN 2
-Resolved  -c/w ASA, ACE-i  -Hold statin due to elevated LFTs  -#Elevated CK, likely secondary to rhabdo secondary to recent fall

## 2020-01-11 NOTE — PROGRESS NOTE ADULT - SUBJECTIVE AND OBJECTIVE BOX
Gonzalez Solano M.D. Pager Number [phone]    Patient is a 59y old  Male who presents with a chief complaint of Chest pain x 1 day (10 Greg 2020 22:45)      SUBJECTIVE / OVERNIGHT EVENTS:  Pt seen and examined at bedside. No acute events overnight.  Pt denies cp, palpitations, sob, abd pain, N/V, fever, chills.    ROS:  All other review of systems negative    Allergies    No Known Allergies    Intolerances        MEDICATIONS  (STANDING):  aspirin enteric coated 81 milliGRAM(s) Oral daily  budesonide 160 MICROgram(s)/formoterol 4.5 MICROgram(s) Inhaler 2 Puff(s) Inhalation two times a day  chlordiazePOXIDE   Oral   chlordiazePOXIDE 50 milliGRAM(s) Oral every 8 hours  folic acid 1 milliGRAM(s) Oral daily  heparin  Injectable 5000 Unit(s) SubCutaneous every 12 hours  lactated ringers. 1000 milliLiter(s) (100 mL/Hr) IV Continuous <Continuous>  lisinopril 5 milliGRAM(s) Oral daily  mirtazapine 15 milliGRAM(s) Oral at bedtime  montelukast 10 milliGRAM(s) Oral daily  multivitamin 1 Tablet(s) Oral daily  pantoprazole    Tablet 40 milliGRAM(s) Oral before breakfast  sodium chloride 0.9% lock flush 3 milliLiter(s) IV Push every 8 hours  thiamine 100 milliGRAM(s) Oral daily    MEDICATIONS  (PRN):  ALBUTerol    90 MICROgram(s) HFA Inhaler 2 Puff(s) Inhalation every 6 hours PRN Shortness of Breath and/or Wheezing      Vital Signs Last 24 Hrs  T(C): 36.7 (11 Jan 2020 12:30), Max: 36.8 (10 Greg 2020 18:47)  T(F): 98.1 (11 Jan 2020 12:30), Max: 98.3 (10 Greg 2020 23:00)  HR: 55 (11 Jan 2020 12:30) (55 - 65)  BP: 127/76 (11 Jan 2020 12:30) (118/68 - 144/79)  BP(mean): --  RR: 17 (11 Jan 2020 12:30) (15 - 18)  SpO2: 100% (11 Jan 2020 12:30) (99% - 100%)  CAPILLARY BLOOD GLUCOSE        I&O's Summary      PHYSICAL EXAM:  GENERAL: NAD, well-developed  HEAD:  Atraumatic, Normocephalic  EYES: EOMI, PERRLA, conjunctiva and sclera clear  NECK: Supple, No JVD  CHEST/LUNG: Clear to auscultation bilaterally; No wheeze  HEART: Regular rate and rhythm; No murmurs, rubs, or gallops  ABDOMEN: Soft, Nontender, Nondistended; Bowel sounds present  EXTREMITIES:  2+ Peripheral Pulses, No clubbing, cyanosis, or edema. +Tremors in outstretched hands  NEUROLOGY: AAOx3, non-focal  PSYCH: calm  SKIN: No rashes or lesions    LABS:                        13.6   5.15  )-----------( 119      ( 11 Jan 2020 06:12 )             42.1     01-11    138  |  106  |  10  ----------------------------<  112<H>  3.5   |  23  |  0.81    Ca    9.1      11 Jan 2020 06:12  Phos  3.0     01-10  Mg     2.0     01-11    TPro  6.6  /  Alb  3.9  /  TBili  0.7  /  DBili  x   /  AST  47<H>  /  ALT  38  /  AlkPhos  54  01-10    PT/INR - ( 09 Jan 2020 18:38 )   PT: 10.6 SEC;   INR: 0.96          PTT - ( 09 Jan 2020 18:38 )  PTT:45.1 SEC  CARDIAC MARKERS ( 11 Jan 2020 06:12 )  x     / x     / 391 u/L / 1.54 ng/mL / x      CARDIAC MARKERS ( 10 Greg 2020 04:20 )  x     / x     / 749 u/L / x     / x      CARDIAC MARKERS ( 09 Jan 2020 21:50 )  x     / x     / 940 u/L / 3.00 ng/mL / x              RADIOLOGY & ADDITIONAL TESTS:  Results Reviewed:   Imaging Personally Reviewed:  Electrocardiogram Personally Reviewed:    COORDINATION OF CARE:  Care Discussed with Consultants/Other Providers [Y/N]:  Prior or Outpatient Records Reviewed [Y/N]:

## 2020-01-11 NOTE — PROGRESS NOTE ADULT - PROBLEM SELECTOR PLAN 1
-CIWA monitoring  -Continue with Librium taper  -SW eval. pt interested in rehab after d/c  -psych consult for evaluation of depression/anxiety that pt is self treating w benzodiazepines from street.

## 2020-01-12 LAB
ANION GAP SERPL CALC-SCNC: 10 MMO/L — SIGNIFICANT CHANGE UP (ref 7–14)
BUN SERPL-MCNC: 11 MG/DL — SIGNIFICANT CHANGE UP (ref 7–23)
CALCIUM SERPL-MCNC: 9.6 MG/DL — SIGNIFICANT CHANGE UP (ref 8.4–10.5)
CHLORIDE SERPL-SCNC: 105 MMOL/L — SIGNIFICANT CHANGE UP (ref 98–107)
CK MB BLD-MCNC: 0.4 — SIGNIFICANT CHANGE UP (ref 0–2.5)
CK MB BLD-MCNC: 1.08 NG/ML — SIGNIFICANT CHANGE UP (ref 1–6.6)
CK SERPL-CCNC: 243 U/L — HIGH (ref 30–200)
CO2 SERPL-SCNC: 25 MMOL/L — SIGNIFICANT CHANGE UP (ref 22–31)
CREAT SERPL-MCNC: 0.91 MG/DL — SIGNIFICANT CHANGE UP (ref 0.5–1.3)
GLUCOSE SERPL-MCNC: 110 MG/DL — HIGH (ref 70–99)
HCT VFR BLD CALC: 40.9 % — SIGNIFICANT CHANGE UP (ref 39–50)
HGB BLD-MCNC: 13.4 G/DL — SIGNIFICANT CHANGE UP (ref 13–17)
MAGNESIUM SERPL-MCNC: 1.9 MG/DL — SIGNIFICANT CHANGE UP (ref 1.6–2.6)
MCHC RBC-ENTMCNC: 29.3 PG — SIGNIFICANT CHANGE UP (ref 27–34)
MCHC RBC-ENTMCNC: 32.8 % — SIGNIFICANT CHANGE UP (ref 32–36)
MCV RBC AUTO: 89.5 FL — SIGNIFICANT CHANGE UP (ref 80–100)
NRBC # FLD: 0 K/UL — SIGNIFICANT CHANGE UP (ref 0–0)
PLATELET # BLD AUTO: 157 K/UL — SIGNIFICANT CHANGE UP (ref 150–400)
PMV BLD: 10.8 FL — SIGNIFICANT CHANGE UP (ref 7–13)
POTASSIUM SERPL-MCNC: 3.7 MMOL/L — SIGNIFICANT CHANGE UP (ref 3.5–5.3)
POTASSIUM SERPL-SCNC: 3.7 MMOL/L — SIGNIFICANT CHANGE UP (ref 3.5–5.3)
RBC # BLD: 4.57 M/UL — SIGNIFICANT CHANGE UP (ref 4.2–5.8)
RBC # FLD: 13.2 % — SIGNIFICANT CHANGE UP (ref 10.3–14.5)
SODIUM SERPL-SCNC: 140 MMOL/L — SIGNIFICANT CHANGE UP (ref 135–145)
WBC # BLD: 6.84 K/UL — SIGNIFICANT CHANGE UP (ref 3.8–10.5)
WBC # FLD AUTO: 6.84 K/UL — SIGNIFICANT CHANGE UP (ref 3.8–10.5)

## 2020-01-12 PROCEDURE — 99232 SBSQ HOSP IP/OBS MODERATE 35: CPT

## 2020-01-12 RX ADMIN — Medication 1 MILLIGRAM(S): at 09:32

## 2020-01-12 RX ADMIN — BUDESONIDE AND FORMOTEROL FUMARATE DIHYDRATE 2 PUFF(S): 160; 4.5 AEROSOL RESPIRATORY (INHALATION) at 21:57

## 2020-01-12 RX ADMIN — MIRTAZAPINE 15 MILLIGRAM(S): 45 TABLET, ORALLY DISINTEGRATING ORAL at 21:57

## 2020-01-12 RX ADMIN — LISINOPRIL 5 MILLIGRAM(S): 2.5 TABLET ORAL at 06:33

## 2020-01-12 RX ADMIN — MONTELUKAST 10 MILLIGRAM(S): 4 TABLET, CHEWABLE ORAL at 09:32

## 2020-01-12 RX ADMIN — BUDESONIDE AND FORMOTEROL FUMARATE DIHYDRATE 2 PUFF(S): 160; 4.5 AEROSOL RESPIRATORY (INHALATION) at 09:32

## 2020-01-12 RX ADMIN — Medication 50 MILLIGRAM(S): at 17:16

## 2020-01-12 RX ADMIN — SODIUM CHLORIDE 3 MILLILITER(S): 9 INJECTION INTRAMUSCULAR; INTRAVENOUS; SUBCUTANEOUS at 12:55

## 2020-01-12 RX ADMIN — Medication 100 MILLIGRAM(S): at 09:32

## 2020-01-12 RX ADMIN — PANTOPRAZOLE SODIUM 40 MILLIGRAM(S): 20 TABLET, DELAYED RELEASE ORAL at 06:33

## 2020-01-12 RX ADMIN — SODIUM CHLORIDE 3 MILLILITER(S): 9 INJECTION INTRAMUSCULAR; INTRAVENOUS; SUBCUTANEOUS at 06:32

## 2020-01-12 RX ADMIN — Medication 1 TABLET(S): at 09:32

## 2020-01-12 RX ADMIN — Medication 81 MILLIGRAM(S): at 09:32

## 2020-01-12 RX ADMIN — SODIUM CHLORIDE 3 MILLILITER(S): 9 INJECTION INTRAMUSCULAR; INTRAVENOUS; SUBCUTANEOUS at 21:57

## 2020-01-12 NOTE — PROGRESS NOTE ADULT - PROBLEM SELECTOR PLAN 1
-CIWA monitoring  -Continue with Librium taper  -SW eval. pt interested in rehab after d/c  -psych recs appreciated

## 2020-01-12 NOTE — PROGRESS NOTE ADULT - SUBJECTIVE AND OBJECTIVE BOX
Gonzalez Solano M.D. Pager Number [phone]    Patient is a 59y old  Male who presents with a chief complaint of Chest pain x 1 day (2020 14:10)      SUBJECTIVE / OVERNIGHT EVENTS:  Pt seen and examined at bedside. No acute events overnight.  Pt denies cp, palpitations, sob, abd pain, N/V, fever, chills.    ROS:  All other review of systems negative    Allergies    No Known Allergies    Intolerances        MEDICATIONS  (STANDING):  aspirin enteric coated 81 milliGRAM(s) Oral daily  budesonide 160 MICROgram(s)/formoterol 4.5 MICROgram(s) Inhaler 2 Puff(s) Inhalation two times a day  chlordiazePOXIDE   Oral   chlordiazePOXIDE 50 milliGRAM(s) Oral every 12 hours  folic acid 1 milliGRAM(s) Oral daily  heparin  Injectable 5000 Unit(s) SubCutaneous every 12 hours  lisinopril 5 milliGRAM(s) Oral daily  mirtazapine 15 milliGRAM(s) Oral at bedtime  montelukast 10 milliGRAM(s) Oral daily  multivitamin 1 Tablet(s) Oral daily  pantoprazole    Tablet 40 milliGRAM(s) Oral before breakfast  sodium chloride 0.9% lock flush 3 milliLiter(s) IV Push every 8 hours    MEDICATIONS  (PRN):  ALBUTerol    90 MICROgram(s) HFA Inhaler 2 Puff(s) Inhalation every 6 hours PRN Shortness of Breath and/or Wheezing      Vital Signs Last 24 Hrs  T(C): 36.7 (2020 06:30), Max: 36.9 (2020 20:30)  T(F): 98 (2020 06:30), Max: 98.5 (2020 20:30)  HR: 61 (2020 06:30) (61 - 66)  BP: 114/75 (2020 06:30) (114/75 - 123/78)  BP(mean): --  RR: 17 (2020 06:30) (17 - 18)  SpO2: 100% (2020 06:30) (99% - 100%)  CAPILLARY BLOOD GLUCOSE        I&O's Summary    2020 07:01  -  2020 07:00  --------------------------------------------------------  IN: 120 mL / OUT: 500 mL / NET: -380 mL    2020 07:01  -  2020 12:37  --------------------------------------------------------  IN: 0 mL / OUT: 800 mL / NET: -800 mL        PHYSICAL EXAM:  GENERAL: NAD, well-developed  CHEST/LUNG: Clear to auscultation bilaterally; No wheeze  HEART: Regular rate and rhythm; No murmurs, rubs, or gallops  ABDOMEN: Soft, Nontender, Nondistended; Bowel sounds present  EXTREMITIES:  2+ Peripheral Pulses, No clubbing, cyanosis, or edema. +Tremors in outstretched hands  NEUROLOGY: AAOx3, non-focal  PSYCH: calm  SKIN: No rashes or lesions    LABS:                        13.4   6.84  )-----------( 157      ( 2020 06:20 )             40.9     01-12    140  |  105  |  11  ----------------------------<  110<H>  3.7   |  25  |  0.91    Ca    9.6      2020 06:20  Mg     1.9     01-12        CARDIAC MARKERS ( 2020 06:20 )  x     / x     / 243 u/L / 1.08 ng/mL / x      CARDIAC MARKERS ( 2020 06:12 )  x     / x     / 391 u/L / 1.54 ng/mL / x          Urinalysis Basic - ( 2020 21:44 )    Color: LIGHT YELLOW / Appearance: CLEAR / S.011 / pH: 7.0  Gluc: NEGATIVE / Ketone: NEGATIVE  / Bili: NEGATIVE / Urobili: NORMAL   Blood: NEGATIVE / Protein: NEGATIVE / Nitrite: NEGATIVE   Leuk Esterase: NEGATIVE / RBC: x / WBC x   Sq Epi: x / Non Sq Epi: x / Bacteria: x        RADIOLOGY & ADDITIONAL TESTS:  Results Reviewed:   Imaging Personally Reviewed:  Electrocardiogram Personally Reviewed:    COORDINATION OF CARE:  Care Discussed with Consultants/Other Providers [Y/N]:  Prior or Outpatient Records Reviewed [Y/N]:

## 2020-01-13 ENCOUNTER — TRANSCRIPTION ENCOUNTER (OUTPATIENT)
Age: 60
End: 2020-01-13

## 2020-01-13 VITALS
HEART RATE: 65 BPM | SYSTOLIC BLOOD PRESSURE: 127 MMHG | RESPIRATION RATE: 16 BRPM | DIASTOLIC BLOOD PRESSURE: 69 MMHG | OXYGEN SATURATION: 100 % | TEMPERATURE: 98 F

## 2020-01-13 LAB
ANION GAP SERPL CALC-SCNC: 13 MMO/L — SIGNIFICANT CHANGE UP (ref 7–14)
BUN SERPL-MCNC: 15 MG/DL — SIGNIFICANT CHANGE UP (ref 7–23)
CALCIUM SERPL-MCNC: 9.3 MG/DL — SIGNIFICANT CHANGE UP (ref 8.4–10.5)
CHLORIDE SERPL-SCNC: 104 MMOL/L — SIGNIFICANT CHANGE UP (ref 98–107)
CO2 SERPL-SCNC: 23 MMOL/L — SIGNIFICANT CHANGE UP (ref 22–31)
CREAT SERPL-MCNC: 0.84 MG/DL — SIGNIFICANT CHANGE UP (ref 0.5–1.3)
GLUCOSE SERPL-MCNC: 108 MG/DL — HIGH (ref 70–99)
HCT VFR BLD CALC: 38.4 % — LOW (ref 39–50)
HGB BLD-MCNC: 12.5 G/DL — LOW (ref 13–17)
MAGNESIUM SERPL-MCNC: 1.7 MG/DL — SIGNIFICANT CHANGE UP (ref 1.6–2.6)
MCHC RBC-ENTMCNC: 28.9 PG — SIGNIFICANT CHANGE UP (ref 27–34)
MCHC RBC-ENTMCNC: 32.6 % — SIGNIFICANT CHANGE UP (ref 32–36)
MCV RBC AUTO: 88.9 FL — SIGNIFICANT CHANGE UP (ref 80–100)
NRBC # FLD: 0 K/UL — SIGNIFICANT CHANGE UP (ref 0–0)
PLATELET # BLD AUTO: 159 K/UL — SIGNIFICANT CHANGE UP (ref 150–400)
PMV BLD: 10.5 FL — SIGNIFICANT CHANGE UP (ref 7–13)
POTASSIUM SERPL-MCNC: 3.6 MMOL/L — SIGNIFICANT CHANGE UP (ref 3.5–5.3)
POTASSIUM SERPL-SCNC: 3.6 MMOL/L — SIGNIFICANT CHANGE UP (ref 3.5–5.3)
RBC # BLD: 4.32 M/UL — SIGNIFICANT CHANGE UP (ref 4.2–5.8)
RBC # FLD: 13.2 % — SIGNIFICANT CHANGE UP (ref 10.3–14.5)
SODIUM SERPL-SCNC: 140 MMOL/L — SIGNIFICANT CHANGE UP (ref 135–145)
WBC # BLD: 6.3 K/UL — SIGNIFICANT CHANGE UP (ref 3.8–10.5)
WBC # FLD AUTO: 6.3 K/UL — SIGNIFICANT CHANGE UP (ref 3.8–10.5)

## 2020-01-13 PROCEDURE — 99239 HOSP IP/OBS DSCHRG MGMT >30: CPT

## 2020-01-13 RX ORDER — FOLIC ACID 0.8 MG
1 TABLET ORAL
Qty: 0 | Refills: 0 | DISCHARGE
Start: 2020-01-13

## 2020-01-13 RX ORDER — MIRTAZAPINE 45 MG/1
1 TABLET, ORALLY DISINTEGRATING ORAL
Qty: 30 | Refills: 0
Start: 2020-01-13 | End: 2020-02-11

## 2020-01-13 RX ORDER — ASPIRIN/CALCIUM CARB/MAGNESIUM 324 MG
1 TABLET ORAL
Qty: 0 | Refills: 0 | DISCHARGE
Start: 2020-01-13

## 2020-01-13 RX ADMIN — Medication 1 TABLET(S): at 07:29

## 2020-01-13 RX ADMIN — Medication 1 MILLIGRAM(S): at 07:29

## 2020-01-13 RX ADMIN — LISINOPRIL 5 MILLIGRAM(S): 2.5 TABLET ORAL at 05:30

## 2020-01-13 RX ADMIN — Medication 50 MILLIGRAM(S): at 07:28

## 2020-01-13 RX ADMIN — BUDESONIDE AND FORMOTEROL FUMARATE DIHYDRATE 2 PUFF(S): 160; 4.5 AEROSOL RESPIRATORY (INHALATION) at 07:29

## 2020-01-13 RX ADMIN — SODIUM CHLORIDE 3 MILLILITER(S): 9 INJECTION INTRAMUSCULAR; INTRAVENOUS; SUBCUTANEOUS at 13:27

## 2020-01-13 RX ADMIN — SODIUM CHLORIDE 3 MILLILITER(S): 9 INJECTION INTRAMUSCULAR; INTRAVENOUS; SUBCUTANEOUS at 05:30

## 2020-01-13 RX ADMIN — Medication 81 MILLIGRAM(S): at 07:29

## 2020-01-13 RX ADMIN — PANTOPRAZOLE SODIUM 40 MILLIGRAM(S): 20 TABLET, DELAYED RELEASE ORAL at 05:30

## 2020-01-13 RX ADMIN — MONTELUKAST 10 MILLIGRAM(S): 4 TABLET, CHEWABLE ORAL at 07:29

## 2020-01-13 NOTE — PROGRESS NOTE ADULT - SUBJECTIVE AND OBJECTIVE BOX
[facility] Division of Hospital Medicine  Cathie Sarah MD  Pager [phone]    Patient is a 59y old  Male who presents with a chief complaint of Chest pain x 1 day       SUBJECTIVE / OVERNIGHT EVENTS: feels well; aware he's finished with taper, concerned he'll need more benzos; explained the librium was for the alcohol and benzo use      MEDICATIONS  (STANDING):  aspirin enteric coated 81 milliGRAM(s) Oral daily  budesonide 160 MICROgram(s)/formoterol 4.5 MICROgram(s) Inhaler 2 Puff(s) Inhalation two times a day  chlordiazePOXIDE   Oral   chlordiazePOXIDE 50 milliGRAM(s) Oral once  folic acid 1 milliGRAM(s) Oral daily  heparin  Injectable 5000 Unit(s) SubCutaneous every 12 hours  lisinopril 5 milliGRAM(s) Oral daily  mirtazapine 15 milliGRAM(s) Oral at bedtime  montelukast 10 milliGRAM(s) Oral daily  multivitamin 1 Tablet(s) Oral daily  pantoprazole    Tablet 40 milliGRAM(s) Oral before breakfast  sodium chloride 0.9% lock flush 3 milliLiter(s) IV Push every 8 hours    MEDICATIONS  (PRN):  ALBUTerol    90 MICROgram(s) HFA Inhaler 2 Puff(s) Inhalation every 6 hours PRN Shortness of Breath and/or Wheezing        PHYSICAL EXAM:  Vital Signs Last 24 Hrs  T(F): 98 (13 Jan 2020 11:57), Max: 98 (12 Jan 2020 16:00)  HR: 65 (13 Jan 2020 11:57) (63 - 77)  BP: 127/69 (13 Jan 2020 11:57) (118/72 - 152/85)  RR: 16 (13 Jan 2020 11:57) (16 - 18)  SpO2: 100% (13 Jan 2020 11:57) (98% - 100%)    CONSTITUTIONAL: NAD  ENMT: Moist oral mucosa  RESPIRATORY: Normal respiratory effort; lungs are clear to auscultation bilaterally  CARDIOVASCULAR: Regular rate and rhythm; No lower extremity edema;   ABDOMEN: Nontender to palpation, normoactive bowel sounds, protuberant   MUSCULOSKELETAL:  no clubbing or cyanosis of digits; no joint swelling or tenderness to palpation  PSYCH affect appropriate  NEUROLOGY: no tremor no gross sensory deficits       LABS:                        12.5   6.30  )-----------( 159      ( 13 Jan 2020 06:00 )             38.4     01-13    140  |  104  |  15  ----------------------------<  108<H>  3.6   |  23  |  0.84    Ca    9.3      13 Jan 2020 06:00  Mg     1.7     01-13

## 2020-01-13 NOTE — DISCHARGE NOTE PROVIDER - HOSPITAL COURSE
59M admitted for chest pain, elevated LFT's  and ETOH withdrawal            # Alcohol withdrawal syndrome with complication.  Plan: -CIWA monitoring    -Completed Librium taper while in hospital     -SW evaluation done, drug abuse rehab information given to patient.    -psych consult for evaluation of depression/anxiety that pt is self treating w benzodiazepines from street. Patient was started on Remeron for anxiety and insomnia          # Chest pain, unspecified type.  Plan: -Resolved    -c/w ASA, ACE-i    -Hold statin due to elevated LFTs        # Elevated CK, likely secondary to rhabdo secondary to recent fall.         # Elevated liver enzymes.  Plan: Transaminitis likely secondary to alcohol abuse    monitor.         # Asthma, unspecified asthma severity, unspecified whether complicated, unspecified whether persistent.  Plan: Stable Continue Advair/ Symbicort.     Proair PRN.         # Essential hypertension.  Plan: Low salt diet.    Continue BP meds.         # Chronic GERD. Plan: Continue PPI.        Patietn stable and cleared for discharge home. Outpatient referrals given to patient by JOHANTHON. Discussed with Dr. Sarah and Dr. Bryan.

## 2020-01-13 NOTE — DISCHARGE NOTE NURSING/CASE MANAGEMENT/SOCIAL WORK - PATIENT PORTAL LINK FT
You can access the FollowMyHealth Patient Portal offered by [facility] by registering at the following website: [email or web address]. By joining [email or web address]’s FollowMyHealth portal, you will also be able to view your health information using other applications (apps) compatible with our system.

## 2020-01-13 NOTE — DISCHARGE NOTE PROVIDER - NSDCMRMEDTOKEN_GEN_ALL_CORE_FT
Advair  mcg-21 mcg/inh inhalation aerosol: 2 puff(s) inhaled 2 times a day    albuterol 90 mcg/inh inhalation aerosol: 2 puff(s) inhaled every 4 hours, As Needed  aspirin 81 mg oral delayed release tablet: 1 tab(s) orally once a day  folic acid 1 mg oral tablet: 1 tab(s) orally once a day  LISINOPRIL   TAB 5MG:   mirtazapine 15 mg oral tablet: 1 tab(s) orally once a day (at bedtime)  MONTELUKAST  TAB 10MG:   Multiple Vitamins oral tablet: 1 tab(s) orally once a day  pantoprazole 40 mg oral delayed release tablet: 1 tab(s) orally once a day (before a meal)

## 2020-01-13 NOTE — DISCHARGE NOTE NURSING/CASE MANAGEMENT/SOCIAL WORK - NSDCFUADDAPPT_GEN_ALL_CORE_FT
[facility] [phone] (M-F 9am-7pm)   [facility] [phone]

## 2020-01-13 NOTE — DISCHARGE NOTE PROVIDER - NSDCFUADDAPPT_GEN_ALL_CORE_FT
[facility] [phone] (M-F 9am-7pm)   [facility] [phone]

## 2020-01-13 NOTE — PROGRESS NOTE ADULT - PROBLEM SELECTOR PLAN 1
completed librium taper  referral for [facility]  psych f/u prior to dc, as well as outpt f/u  pre-DM a1c; likely as a result of alcohol; pt education for diet

## 2020-08-13 ENCOUNTER — INPATIENT (INPATIENT)
Facility: HOSPITAL | Age: 60
LOS: 3 days | Discharge: HOME | End: 2020-08-17
Attending: INTERNAL MEDICINE | Admitting: INTERNAL MEDICINE
Payer: MEDICAID

## 2020-08-13 VITALS
SYSTOLIC BLOOD PRESSURE: 139 MMHG | TEMPERATURE: 98 F | RESPIRATION RATE: 18 BRPM | HEART RATE: 95 BPM | DIASTOLIC BLOOD PRESSURE: 99 MMHG | OXYGEN SATURATION: 100 %

## 2020-08-13 LAB
ALBUMIN SERPL ELPH-MCNC: 4.5 G/DL — SIGNIFICANT CHANGE UP (ref 3.3–5)
ALP SERPL-CCNC: 59 U/L — SIGNIFICANT CHANGE UP (ref 40–120)
ALT FLD-CCNC: 38 U/L — SIGNIFICANT CHANGE UP (ref 4–41)
ANION GAP SERPL CALC-SCNC: 17 MMO/L — HIGH (ref 7–14)
APAP SERPL-MCNC: < 15 UG/ML — LOW (ref 15–25)
AST SERPL-CCNC: 50 U/L — HIGH (ref 4–40)
BASE EXCESS BLDV CALC-SCNC: 3 MMOL/L — SIGNIFICANT CHANGE UP
BASOPHILS # BLD AUTO: 0.11 K/UL — SIGNIFICANT CHANGE UP (ref 0–0.2)
BASOPHILS NFR BLD AUTO: 1.3 % — SIGNIFICANT CHANGE UP (ref 0–2)
BILIRUB SERPL-MCNC: 0.2 MG/DL — SIGNIFICANT CHANGE UP (ref 0.2–1.2)
BLOOD GAS VENOUS - CREATININE: 0.86 MG/DL — SIGNIFICANT CHANGE UP (ref 0.5–1.3)
BUN SERPL-MCNC: 12 MG/DL — SIGNIFICANT CHANGE UP (ref 7–23)
CALCIUM SERPL-MCNC: 9.4 MG/DL — SIGNIFICANT CHANGE UP (ref 8.4–10.5)
CHLORIDE BLDV-SCNC: 114 MMOL/L — HIGH (ref 96–108)
CHLORIDE SERPL-SCNC: 107 MMOL/L — SIGNIFICANT CHANGE UP (ref 98–107)
CO2 SERPL-SCNC: 23 MMOL/L — SIGNIFICANT CHANGE UP (ref 22–31)
CREAT SERPL-MCNC: 0.82 MG/DL — SIGNIFICANT CHANGE UP (ref 0.5–1.3)
EOSINOPHIL # BLD AUTO: 0.42 K/UL — SIGNIFICANT CHANGE UP (ref 0–0.5)
EOSINOPHIL NFR BLD AUTO: 5.1 % — SIGNIFICANT CHANGE UP (ref 0–6)
ETHANOL BLD-MCNC: 197 MG/DL — HIGH
GAS PNL BLDV: 147 MMOL/L — HIGH (ref 136–146)
GLUCOSE BLDV-MCNC: 137 MG/DL — HIGH (ref 70–99)
GLUCOSE SERPL-MCNC: 140 MG/DL — HIGH (ref 70–99)
HCO3 BLDV-SCNC: 26 MMOL/L — SIGNIFICANT CHANGE UP (ref 20–27)
HCT VFR BLD CALC: 47.4 % — SIGNIFICANT CHANGE UP (ref 39–50)
HCT VFR BLDV CALC: 47.5 % — SIGNIFICANT CHANGE UP (ref 39–51)
HGB BLD-MCNC: 15 G/DL — SIGNIFICANT CHANGE UP (ref 13–17)
HGB BLDV-MCNC: 15.5 G/DL — SIGNIFICANT CHANGE UP (ref 13–17)
IMM GRANULOCYTES NFR BLD AUTO: 0.2 % — SIGNIFICANT CHANGE UP (ref 0–1.5)
LACTATE BLDV-MCNC: 3.5 MMOL/L — HIGH (ref 0.5–2)
LIDOCAIN IGE QN: 15.4 U/L — SIGNIFICANT CHANGE UP (ref 7–60)
LYMPHOCYTES # BLD AUTO: 5.6 K/UL — HIGH (ref 1–3.3)
LYMPHOCYTES # BLD AUTO: 68 % — HIGH (ref 13–44)
MCHC RBC-ENTMCNC: 28.7 PG — SIGNIFICANT CHANGE UP (ref 27–34)
MCHC RBC-ENTMCNC: 31.6 % — LOW (ref 32–36)
MCV RBC AUTO: 90.6 FL — SIGNIFICANT CHANGE UP (ref 80–100)
MONOCYTES # BLD AUTO: 0.65 K/UL — SIGNIFICANT CHANGE UP (ref 0–0.9)
MONOCYTES NFR BLD AUTO: 7.9 % — SIGNIFICANT CHANGE UP (ref 2–14)
NEUTROPHILS # BLD AUTO: 1.43 K/UL — LOW (ref 1.8–7.4)
NEUTROPHILS NFR BLD AUTO: 17.5 % — LOW (ref 43–77)
NRBC # FLD: 0 K/UL — SIGNIFICANT CHANGE UP (ref 0–0)
PCO2 BLDV: 44 MMHG — SIGNIFICANT CHANGE UP (ref 41–51)
PH BLDV: 7.41 PH — SIGNIFICANT CHANGE UP (ref 7.32–7.43)
PLATELET # BLD AUTO: 262 K/UL — SIGNIFICANT CHANGE UP (ref 150–400)
PMV BLD: 9 FL — SIGNIFICANT CHANGE UP (ref 7–13)
PO2 BLDV: 34 MMHG — LOW (ref 35–40)
POTASSIUM BLDV-SCNC: 3.6 MMOL/L — SIGNIFICANT CHANGE UP (ref 3.4–4.5)
POTASSIUM SERPL-MCNC: 3.8 MMOL/L — SIGNIFICANT CHANGE UP (ref 3.5–5.3)
POTASSIUM SERPL-SCNC: 3.8 MMOL/L — SIGNIFICANT CHANGE UP (ref 3.5–5.3)
PROT SERPL-MCNC: 7.3 G/DL — SIGNIFICANT CHANGE UP (ref 6–8.3)
RBC # BLD: 5.23 M/UL — SIGNIFICANT CHANGE UP (ref 4.2–5.8)
RBC # FLD: 12.4 % — SIGNIFICANT CHANGE UP (ref 10.3–14.5)
SALICYLATES SERPL-MCNC: < 5 MG/DL — LOW (ref 15–30)
SAO2 % BLDV: 51.4 % — LOW (ref 60–85)
SODIUM SERPL-SCNC: 147 MMOL/L — HIGH (ref 135–145)
WBC # BLD: 8.23 K/UL — SIGNIFICANT CHANGE UP (ref 3.8–10.5)
WBC # FLD AUTO: 8.23 K/UL — SIGNIFICANT CHANGE UP (ref 3.8–10.5)

## 2020-08-13 PROCEDURE — 99285 EMERGENCY DEPT VISIT HI MDM: CPT

## 2020-08-13 RX ORDER — SODIUM CHLORIDE 9 MG/ML
1000 INJECTION INTRAMUSCULAR; INTRAVENOUS; SUBCUTANEOUS ONCE
Refills: 0 | Status: COMPLETED | OUTPATIENT
Start: 2020-08-13 | End: 2020-08-13

## 2020-08-13 RX ADMIN — SODIUM CHLORIDE 1000 MILLILITER(S): 9 INJECTION INTRAMUSCULAR; INTRAVENOUS; SUBCUTANEOUS at 23:44

## 2020-08-13 RX ADMIN — SODIUM CHLORIDE 1000 MILLILITER(S): 9 INJECTION INTRAMUSCULAR; INTRAVENOUS; SUBCUTANEOUS at 22:53

## 2020-08-13 NOTE — ED ADULT NURSE NOTE - OBJECTIVE STATEMENT
break coverage RN: pt received in spot 20, pt brought in by EMS for intox. pt's girlfriend called for "pt drinking for 5 days straight". pt is admitting to drinking "Heineken and vodka" today, unable to elaborate further on how much. Pt stating "I am so drunk", has significant slurred speech. Denies pain. Resp even and unlabored. pt is denying any SI, HI, AH, VH to this author at this time. On cardiac monitor. 20G IV placed to R wrist. Labs drawn and sent. Belongings secured across from room 21. Will continue to monitor.

## 2020-08-13 NOTE — ED PROVIDER NOTE - CLINICAL SUMMARY MEDICAL DECISION MAKING FREE TEXT BOX
58 y/o M PMH alcohol abuse w hx withdrawal, HTN HLD asthma, gastritis presents to the ED intoxicated via EMS. per triage note, concern for SI/worsening depression. no falls or head trauma. ddx alcohol intox vs other ingestion, SI, depression, alcoholic ketoacidosis. PE- pt awake, alert, NCAT, 5/5 strength upper and lower extremities b/l. plan labs IVF will reassess when sober

## 2020-08-13 NOTE — ED ADULT TRIAGE NOTE - CHIEF COMPLAINT QUOTE
Pt arrives police escorted. PT  st" I drank too much." As per EMT" Wife called reporting he has been drinking for 5 days straight...he drank rubbing alcholol apprx one bottle. She reports he is depressed, due to recent job loss. No hx of suicidial attempts. He was ambulatory at scene gait steady." hx alcholol abuse with withdrawals. hx  of Asthma. WIfe Roxanna Samad [phone] pt st" I want to hurt self peacefully." Pt speech is slurred>aox2.. Falling asleep during triage questions, Pt arrives police escorted. PT  st" I drank too much." As per EMT" Wife called reporting he has been drinking for 5 days straight...he drank rubbing alcholol apprx one bottle. She reports he is depressed, due to recent job loss. No hx of suicidial attempts. He was ambulatory at scene gait steady." hx alcholol abuse with withdrawals. hx  of Asthma. WIfe Roxanna Samad [phone] pt st" I want to hurt self peacefully." Pt speech is slurred>aox2.. Falling asleep during triage questions, HOB elevated. Belongings secured in Closet 21 by PCA Rosalio

## 2020-08-13 NOTE — ED ADULT NURSE NOTE - CHIEF COMPLAINT QUOTE
Pt arrives police escorted. PT  st" I drank too much." As per EMT" Wife called reporting he has been drinking for 5 days straight...he drank rubbing alcholol apprx one bottle. She reports he is depressed, due to recent job loss. No hx of suicidial attempts. He was ambulatory at scene gait steady." hx alcholol abuse with withdrawals. hx  of Asthma. WIfe Roxanna Samad [phone] pt st" I want to hurt self peacefully." Pt speech is slurred>aox2.. Falling asleep during triage questions, HOB elevated. Belongings secured in Closet 21 by PCA Rosalio

## 2020-08-13 NOTE — ED PROVIDER NOTE - PHYSICAL EXAMINATION
Gen: intoxicated adult male NAD   HEENT: NCAT, EOMI, no nasal discharge, mucous membranes moist no maxillary tenderness   skin: no abrasions ecchymosis   CV: RRR, +S1/S2, no M/R/G  Resp: CTAB, no W/R/R  GI: Abdomen soft non-distended, NTTP, no masses  MSK: No open wounds, no bruising, no LE edema  Neuro: A&Ox1 (self), following commands, moving all four extremities spontaneously strength 5/5 UE/LEs b/l sensation intact and equal b/l   Psych: appropriate mood

## 2020-08-13 NOTE — ED PROVIDER NOTE - ATTENDING CONTRIBUTION TO CARE
Attending note:   After face to face evaluation of this patient, I concur with above noted hx, pe, and care plan for this patient.  Jung: 59 yom with hx of alcohol abuse and withdrawal. Pt's GF states he has been drinking for 7 days straight. Pt denies any SI/HI. Admits to drinking beer and alcohol. Denies rubbing alcohol. no trauma. Pt is intoxicated, slurred speech, minimal conversion with repeated questions, clear lungs with slight expiratory wheeze (pt denies sob, hx of asthma) RRR, abd soft and non tender, no edema, moving all ext, PERRL, +arcus senilis, eomi. moving all ext but not to commands. Will check labs, fluids, reassess for sobriety.

## 2020-08-13 NOTE — ED PROVIDER NOTE - PROGRESS NOTE DETAILS
Ford, PGY2 - pt's alcohol level 197, CTH ordered as pt appears more intoxicated than level indicates. still moving 4/4 extremities spontaneously. Ford, PGY2 - Ct negative, UA negative, lactate 3.8 after 2L, no elevated wbc count, normal pH. pt reassessed, awake, AOx3, moving 4/4 extrem spontaneously. still mumbling/slightly slurred speech. will reassess again 1 hour. Ford, PGY2 - pt awake and alert, ambulating independently, clinically sober. pt with hx depression, +endorsing SI to myself and his 1:1. discussed with psychiatry, pt will be transferred to  for further evaluation. Ford, PGY2 - pt awake and alert, ambulating independently, clinically sober, medically cleared. pt with hx depression, +endorsing SI to myself and his 1:1. discussed with psychiatry, pt will be transferred to  for further evaluation. Gong: Patient awake and alert, a+o x3 noted to have CIWA of 7 despite librium early tonight.  Pt noted to have fine tremor with psychomotor agitation.  Pt no longer suicidal or homicidal, however reports severe depression after losing job as an  and heavily drinking over the past week.  Pt to be admitted to medicine for ETOH withdrawal.

## 2020-08-13 NOTE — ED PROVIDER NOTE - OBJECTIVE STATEMENT
60 y/o M PMH alcohol abuse w hx withdrawal, HTN HLD asthma, gastritis presents to the ED intoxicated via EMS. Pt and triage report pt's partner called EMS concerned for intoxication. Pt intoxicated, awake but with slurred speech, unreliable historian. Per triage note, partner endorsed pt with worsening depression, concern for SI. Pt denies SI/HI, AH/VH. 58 y/o M PMH alcohol abuse w hx withdrawal, HTN HLD asthma, gastritis presents to the ED intoxicated via EMS. Pt and triage report pt's partner called EMS concerned for intoxication. Pt intoxicated, awake but with slurred speech, unreliable historian. Per triage note, partner endorsed pt with worsening depression, concern for SI. Pt denies SI/HI, AH/VH.    partner Roxanna Conrad reports pt has been drinking 7 days beer and liquor. denies falls, head injuries, f/c, recent illness. states she is concerned he may be self harming with alcohol. unsure of other substance use.     PMD: Dr. Hernandez ([facility])

## 2020-08-14 DIAGNOSIS — Z02.9 ENCOUNTER FOR ADMINISTRATIVE EXAMINATIONS, UNSPECIFIED: ICD-10-CM

## 2020-08-14 DIAGNOSIS — Z20.828 CONTACT WITH AND (SUSPECTED) EXPOSURE TO OTHER VIRAL COMMUNICABLE DISEASES: ICD-10-CM

## 2020-08-14 DIAGNOSIS — F10.10 ALCOHOL ABUSE, UNCOMPLICATED: ICD-10-CM

## 2020-08-14 DIAGNOSIS — J45.909 UNSPECIFIED ASTHMA, UNCOMPLICATED: ICD-10-CM

## 2020-08-14 DIAGNOSIS — I10 ESSENTIAL (PRIMARY) HYPERTENSION: ICD-10-CM

## 2020-08-14 DIAGNOSIS — F10.20 ALCOHOL DEPENDENCE, UNCOMPLICATED: ICD-10-CM

## 2020-08-14 DIAGNOSIS — F10.239 ALCOHOL DEPENDENCE WITH WITHDRAWAL, UNSPECIFIED: ICD-10-CM

## 2020-08-14 DIAGNOSIS — R79.89 OTHER SPECIFIED ABNORMAL FINDINGS OF BLOOD CHEMISTRY: ICD-10-CM

## 2020-08-14 DIAGNOSIS — Z29.9 ENCOUNTER FOR PROPHYLACTIC MEASURES, UNSPECIFIED: ICD-10-CM

## 2020-08-14 DIAGNOSIS — K21.9 GASTRO-ESOPHAGEAL REFLUX DISEASE WITHOUT ESOPHAGITIS: ICD-10-CM

## 2020-08-14 DIAGNOSIS — R45.851 SUICIDAL IDEATIONS: ICD-10-CM

## 2020-08-14 DIAGNOSIS — F10.230 ALCOHOL DEPENDENCE WITH WITHDRAWAL, UNCOMPLICATED: ICD-10-CM

## 2020-08-14 LAB
ALBUMIN SERPL ELPH-MCNC: 4.1 G/DL — SIGNIFICANT CHANGE UP (ref 3.3–5)
ALP SERPL-CCNC: 58 U/L — SIGNIFICANT CHANGE UP (ref 40–120)
ALT FLD-CCNC: 37 U/L — SIGNIFICANT CHANGE UP (ref 4–41)
AMPHET UR-MCNC: NEGATIVE — SIGNIFICANT CHANGE UP
ANION GAP SERPL CALC-SCNC: 16 MMO/L — HIGH (ref 7–14)
APPEARANCE UR: CLEAR — SIGNIFICANT CHANGE UP
AST SERPL-CCNC: 46 U/L — HIGH (ref 4–40)
B-OH-BUTYR SERPL-SCNC: 0.1 MMOL/L — SIGNIFICANT CHANGE UP (ref 0–0.4)
BARBITURATES UR SCN-MCNC: NEGATIVE — SIGNIFICANT CHANGE UP
BASE EXCESS BLDV CALC-SCNC: -0.3 MMOL/L — SIGNIFICANT CHANGE UP
BASE EXCESS BLDV CALC-SCNC: -0.6 MMOL/L — SIGNIFICANT CHANGE UP
BASOPHILS NFR SPEC: 2.7 % — HIGH (ref 0–2)
BENZODIAZ UR-MCNC: POSITIVE — SIGNIFICANT CHANGE UP
BILIRUB SERPL-MCNC: < 0.2 MG/DL — LOW (ref 0.2–1.2)
BILIRUB UR-MCNC: NEGATIVE — SIGNIFICANT CHANGE UP
BLASTS # FLD: 0 % — SIGNIFICANT CHANGE UP (ref 0–0)
BLOOD GAS VENOUS - CREATININE: 0.81 MG/DL — SIGNIFICANT CHANGE UP (ref 0.5–1.3)
BLOOD GAS VENOUS - CREATININE: 0.83 MG/DL — SIGNIFICANT CHANGE UP (ref 0.5–1.3)
BLOOD GAS VENOUS - FIO2: 21 — SIGNIFICANT CHANGE UP
BLOOD UR QL VISUAL: NEGATIVE — SIGNIFICANT CHANGE UP
BUN SERPL-MCNC: 10 MG/DL — SIGNIFICANT CHANGE UP (ref 7–23)
CALCIUM SERPL-MCNC: 8.5 MG/DL — SIGNIFICANT CHANGE UP (ref 8.4–10.5)
CANNABINOIDS UR-MCNC: NEGATIVE — SIGNIFICANT CHANGE UP
CHLORIDE BLDV-SCNC: 113 MMOL/L — HIGH (ref 96–108)
CHLORIDE BLDV-SCNC: 116 MMOL/L — HIGH (ref 96–108)
CHLORIDE SERPL-SCNC: 109 MMOL/L — HIGH (ref 98–107)
CO2 SERPL-SCNC: 22 MMOL/L — SIGNIFICANT CHANGE UP (ref 22–31)
COCAINE METAB.OTHER UR-MCNC: NEGATIVE — SIGNIFICANT CHANGE UP
COLOR SPEC: COLORLESS — SIGNIFICANT CHANGE UP
CREAT SERPL-MCNC: 0.75 MG/DL — SIGNIFICANT CHANGE UP (ref 0.5–1.3)
EOSINOPHIL NFR FLD: 9.8 % — HIGH (ref 0–6)
GAS PNL BLDV: 150 MMOL/L — HIGH (ref 136–146)
GAS PNL BLDV: 151 MMOL/L — HIGH (ref 136–146)
GIANT PLATELETS BLD QL SMEAR: PRESENT — SIGNIFICANT CHANGE UP
GLUCOSE BLDV-MCNC: 120 MG/DL — HIGH (ref 70–99)
GLUCOSE BLDV-MCNC: 123 MG/DL — HIGH (ref 70–99)
GLUCOSE SERPL-MCNC: 125 MG/DL — HIGH (ref 70–99)
GLUCOSE UR-MCNC: NEGATIVE — SIGNIFICANT CHANGE UP
HCO3 BLDV-SCNC: 23 MMOL/L — SIGNIFICANT CHANGE UP (ref 20–27)
HCO3 BLDV-SCNC: 23 MMOL/L — SIGNIFICANT CHANGE UP (ref 20–27)
HCT VFR BLD CALC: 45 % — SIGNIFICANT CHANGE UP (ref 39–50)
HCT VFR BLDV CALC: 44.6 % — SIGNIFICANT CHANGE UP (ref 39–51)
HCT VFR BLDV CALC: 45.7 % — SIGNIFICANT CHANGE UP (ref 39–51)
HGB BLD-MCNC: 14.1 G/DL — SIGNIFICANT CHANGE UP (ref 13–17)
HGB BLDV-MCNC: 14.5 G/DL — SIGNIFICANT CHANGE UP (ref 13–17)
HGB BLDV-MCNC: 14.9 G/DL — SIGNIFICANT CHANGE UP (ref 13–17)
KETONES UR-MCNC: SIGNIFICANT CHANGE UP
LACTATE BLDV-MCNC: 3 MMOL/L — HIGH (ref 0.5–2)
LACTATE BLDV-MCNC: 3.8 MMOL/L — HIGH (ref 0.5–2)
LEUKOCYTE ESTERASE UR-ACNC: NEGATIVE — SIGNIFICANT CHANGE UP
LIDOCAIN IGE QN: 11.4 U/L — SIGNIFICANT CHANGE UP (ref 7–60)
LYMPHOCYTES NFR SPEC AUTO: 27.7 % — SIGNIFICANT CHANGE UP (ref 13–44)
MAGNESIUM SERPL-MCNC: 1.9 MG/DL — SIGNIFICANT CHANGE UP (ref 1.6–2.6)
MCHC RBC-ENTMCNC: 28.8 PG — SIGNIFICANT CHANGE UP (ref 27–34)
MCHC RBC-ENTMCNC: 31.3 % — LOW (ref 32–36)
MCV RBC AUTO: 91.8 FL — SIGNIFICANT CHANGE UP (ref 80–100)
METAMYELOCYTES # FLD: 0 % — SIGNIFICANT CHANGE UP (ref 0–1)
METHADONE UR-MCNC: NEGATIVE — SIGNIFICANT CHANGE UP
MONOCYTES NFR BLD: 7.1 % — SIGNIFICANT CHANGE UP (ref 2–9)
MYELOCYTES NFR BLD: 0 % — SIGNIFICANT CHANGE UP (ref 0–0)
NEUTROPHIL AB SER-ACNC: 17.9 % — LOW (ref 43–77)
NEUTS BAND # BLD: 0 % — SIGNIFICANT CHANGE UP (ref 0–6)
NITRITE UR-MCNC: NEGATIVE — SIGNIFICANT CHANGE UP
NRBC # FLD: 0 K/UL — SIGNIFICANT CHANGE UP (ref 0–0)
OPIATES UR-MCNC: NEGATIVE — SIGNIFICANT CHANGE UP
OTHER - HEMATOLOGY %: 0 — SIGNIFICANT CHANGE UP
OXYCODONE UR-MCNC: NEGATIVE — SIGNIFICANT CHANGE UP
PCO2 BLDV: 43 MMHG — SIGNIFICANT CHANGE UP (ref 41–51)
PCO2 BLDV: 43 MMHG — SIGNIFICANT CHANGE UP (ref 41–51)
PCP UR-MCNC: NEGATIVE — SIGNIFICANT CHANGE UP
PH BLDV: 7.36 PH — SIGNIFICANT CHANGE UP (ref 7.32–7.43)
PH BLDV: 7.37 PH — SIGNIFICANT CHANGE UP (ref 7.32–7.43)
PH UR: 7 — SIGNIFICANT CHANGE UP (ref 5–8)
PHOSPHATE SERPL-MCNC: 2.9 MG/DL — SIGNIFICANT CHANGE UP (ref 2.5–4.5)
PLATELET # BLD AUTO: 246 K/UL — SIGNIFICANT CHANGE UP (ref 150–400)
PLATELET COUNT - ESTIMATE: NORMAL — SIGNIFICANT CHANGE UP
PMV BLD: 9.1 FL — SIGNIFICANT CHANGE UP (ref 7–13)
PO2 BLDV: 39 MMHG — SIGNIFICANT CHANGE UP (ref 35–40)
PO2 BLDV: 41 MMHG — HIGH (ref 35–40)
POTASSIUM BLDV-SCNC: 3.6 MMOL/L — SIGNIFICANT CHANGE UP (ref 3.4–4.5)
POTASSIUM BLDV-SCNC: 3.8 MMOL/L — SIGNIFICANT CHANGE UP (ref 3.4–4.5)
POTASSIUM SERPL-MCNC: 3.9 MMOL/L — SIGNIFICANT CHANGE UP (ref 3.5–5.3)
POTASSIUM SERPL-SCNC: 3.9 MMOL/L — SIGNIFICANT CHANGE UP (ref 3.5–5.3)
PROMYELOCYTES # FLD: 0 % — SIGNIFICANT CHANGE UP (ref 0–0)
PROT SERPL-MCNC: 6.8 G/DL — SIGNIFICANT CHANGE UP (ref 6–8.3)
PROT UR-MCNC: NEGATIVE — SIGNIFICANT CHANGE UP
RBC # BLD: 4.9 M/UL — SIGNIFICANT CHANGE UP (ref 4.2–5.8)
RBC # FLD: 12.6 % — SIGNIFICANT CHANGE UP (ref 10.3–14.5)
SAO2 % BLDV: 59.2 % — LOW (ref 60–85)
SAO2 % BLDV: 64.8 % — SIGNIFICANT CHANGE UP (ref 60–85)
SARS-COV-2 RNA SPEC QL NAA+PROBE: SIGNIFICANT CHANGE UP
SMUDGE CELLS # BLD: PRESENT — SIGNIFICANT CHANGE UP
SODIUM SERPL-SCNC: 147 MMOL/L — HIGH (ref 135–145)
SP GR SPEC: 1.01 — SIGNIFICANT CHANGE UP (ref 1–1.04)
UROBILINOGEN FLD QL: NORMAL — SIGNIFICANT CHANGE UP
VARIANT LYMPHS # BLD: 34.8 % — SIGNIFICANT CHANGE UP
WBC # BLD: 6.49 K/UL — SIGNIFICANT CHANGE UP (ref 3.8–10.5)
WBC # FLD AUTO: 6.49 K/UL — SIGNIFICANT CHANGE UP (ref 3.8–10.5)

## 2020-08-14 PROCEDURE — 90792 PSYCH DIAG EVAL W/MED SRVCS: CPT

## 2020-08-14 PROCEDURE — 70450 CT HEAD/BRAIN W/O DYE: CPT | Mod: 26

## 2020-08-14 PROCEDURE — 99223 1ST HOSP IP/OBS HIGH 75: CPT | Mod: GC

## 2020-08-14 RX ORDER — FOLIC ACID 0.8 MG
1 TABLET ORAL DAILY
Refills: 0 | Status: DISCONTINUED | OUTPATIENT
Start: 2020-08-14 | End: 2020-08-17

## 2020-08-14 RX ORDER — ALBUTEROL 90 UG/1
2 AEROSOL, METERED ORAL EVERY 6 HOURS
Refills: 0 | Status: DISCONTINUED | OUTPATIENT
Start: 2020-08-14 | End: 2020-08-17

## 2020-08-14 RX ORDER — MONTELUKAST 4 MG/1
10 TABLET, CHEWABLE ORAL DAILY
Refills: 0 | Status: DISCONTINUED | OUTPATIENT
Start: 2020-08-14 | End: 2020-08-17

## 2020-08-14 RX ORDER — ONDANSETRON 8 MG/1
4 TABLET, FILM COATED ORAL ONCE
Refills: 0 | Status: COMPLETED | OUTPATIENT
Start: 2020-08-14 | End: 2020-08-14

## 2020-08-14 RX ORDER — ASPIRIN/CALCIUM CARB/MAGNESIUM 324 MG
81 TABLET ORAL DAILY
Refills: 0 | Status: DISCONTINUED | OUTPATIENT
Start: 2020-08-14 | End: 2020-08-17

## 2020-08-14 RX ORDER — THIAMINE MONONITRATE (VIT B1) 100 MG
500 TABLET ORAL EVERY 8 HOURS
Refills: 0 | Status: COMPLETED | OUTPATIENT
Start: 2020-08-14 | End: 2020-08-17

## 2020-08-14 RX ORDER — LANOLIN ALCOHOL/MO/W.PET/CERES
3 CREAM (GRAM) TOPICAL ONCE
Refills: 0 | Status: COMPLETED | OUTPATIENT
Start: 2020-08-14 | End: 2020-08-14

## 2020-08-14 RX ORDER — SODIUM CHLORIDE 9 MG/ML
1000 INJECTION INTRAMUSCULAR; INTRAVENOUS; SUBCUTANEOUS ONCE
Refills: 0 | Status: COMPLETED | OUTPATIENT
Start: 2020-08-14 | End: 2020-08-14

## 2020-08-14 RX ORDER — SODIUM CHLORIDE 9 MG/ML
1000 INJECTION, SOLUTION INTRAVENOUS ONCE
Refills: 0 | Status: COMPLETED | OUTPATIENT
Start: 2020-08-14 | End: 2020-08-14

## 2020-08-14 RX ORDER — ASCORBIC ACID 60 MG
500 TABLET,CHEWABLE ORAL ONCE
Refills: 0 | Status: COMPLETED | OUTPATIENT
Start: 2020-08-14 | End: 2020-08-14

## 2020-08-14 RX ORDER — LISINOPRIL 2.5 MG/1
5 TABLET ORAL DAILY
Refills: 0 | Status: DISCONTINUED | OUTPATIENT
Start: 2020-08-14 | End: 2020-08-17

## 2020-08-14 RX ORDER — PANTOPRAZOLE SODIUM 20 MG/1
40 TABLET, DELAYED RELEASE ORAL
Refills: 0 | Status: DISCONTINUED | OUTPATIENT
Start: 2020-08-14 | End: 2020-08-17

## 2020-08-14 RX ORDER — THIAMINE MONONITRATE (VIT B1) 100 MG
100 TABLET ORAL ONCE
Refills: 0 | Status: COMPLETED | OUTPATIENT
Start: 2020-08-14 | End: 2020-08-14

## 2020-08-14 RX ORDER — BUDESONIDE AND FORMOTEROL FUMARATE DIHYDRATE 160; 4.5 UG/1; UG/1
2 AEROSOL RESPIRATORY (INHALATION)
Refills: 0 | Status: DISCONTINUED | OUTPATIENT
Start: 2020-08-14 | End: 2020-08-17

## 2020-08-14 RX ORDER — THIAMINE MONONITRATE (VIT B1) 100 MG
100 TABLET ORAL DAILY
Refills: 0 | Status: DISCONTINUED | OUTPATIENT
Start: 2020-08-17 | End: 2020-08-17

## 2020-08-14 RX ADMIN — Medication 81 MILLIGRAM(S): at 19:01

## 2020-08-14 RX ADMIN — Medication 50 MILLIGRAM(S): at 15:29

## 2020-08-14 RX ADMIN — ONDANSETRON 4 MILLIGRAM(S): 8 TABLET, FILM COATED ORAL at 04:05

## 2020-08-14 RX ADMIN — Medication 50 MILLIGRAM(S): at 04:34

## 2020-08-14 RX ADMIN — Medication 1 MILLIGRAM(S): at 02:15

## 2020-08-14 RX ADMIN — LISINOPRIL 5 MILLIGRAM(S): 2.5 TABLET ORAL at 19:01

## 2020-08-14 RX ADMIN — Medication 50 MILLIGRAM(S): at 20:29

## 2020-08-14 RX ADMIN — SODIUM CHLORIDE 1000 MILLILITER(S): 9 INJECTION INTRAMUSCULAR; INTRAVENOUS; SUBCUTANEOUS at 04:00

## 2020-08-14 RX ADMIN — Medication 105 MILLIGRAM(S): at 21:45

## 2020-08-14 RX ADMIN — Medication 500 MILLIGRAM(S): at 02:15

## 2020-08-14 RX ADMIN — MONTELUKAST 10 MILLIGRAM(S): 4 TABLET, CHEWABLE ORAL at 19:01

## 2020-08-14 RX ADMIN — BUDESONIDE AND FORMOTEROL FUMARATE DIHYDRATE 2 PUFF(S): 160; 4.5 AEROSOL RESPIRATORY (INHALATION) at 21:44

## 2020-08-14 RX ADMIN — ALBUTEROL 2 PUFF(S): 90 AEROSOL, METERED ORAL at 11:42

## 2020-08-14 RX ADMIN — Medication 100 MILLIGRAM(S): at 02:15

## 2020-08-14 RX ADMIN — SODIUM CHLORIDE 1000 MILLILITER(S): 9 INJECTION INTRAMUSCULAR; INTRAVENOUS; SUBCUTANEOUS at 01:30

## 2020-08-14 RX ADMIN — Medication 25 MILLIGRAM(S): at 09:17

## 2020-08-14 RX ADMIN — SODIUM CHLORIDE 1000 MILLILITER(S): 9 INJECTION, SOLUTION INTRAVENOUS at 09:18

## 2020-08-14 RX ADMIN — SODIUM CHLORIDE 1000 MILLILITER(S): 9 INJECTION INTRAMUSCULAR; INTRAVENOUS; SUBCUTANEOUS at 02:19

## 2020-08-14 RX ADMIN — Medication 3 MILLIGRAM(S): at 21:45

## 2020-08-14 NOTE — H&P ADULT - NSHPREVIEWOFSYSTEMS_GEN_ALL_CORE
REVIEW OF SYSTEMS      General:	Denies fevers, chills, night sweats, weight loss, weight gain.  	  Ophthalmologic: Denies recent vision changes    Respiratory: + wheezing. No difficulty breathing, cough  	  Cardiovascular: No chest pain or palpitations	    Gastrointestinal: + dark/black stools (attributed to vitamins - never sticky/tar-like). No abdominal pain, nausea, vomiting, diarrhea, constipation, hematochezia, melena	    Musculoskeletal: No muscle pain, weakness, joint swelling or pain    Neurological: No headache, difficulty eating/speaking, weakness, tingling	    Psychiatric: +depression, + difficulty concentrating ,+ sleep disturbance, + anhedonia, + SI (but not presently)	    Hematology/Lymphatics: No easy bleeding/brusing    Endocrine: No increased thirst, need for urination, heat/cold intolerance REVIEW OF SYSTEMS  General:	Denies fevers, chills, night sweats, weight loss, weight gain.  Ophthalmologic: Denies recent vision changes  ENT: no throat pain  Respiratory: + wheezing. No difficulty breathing, cough  Cardiovascular: No chest pain or palpitations	  Gastrointestinal: + dark/black stools (attributed to vitamins - never sticky/tar-like). No abdominal pain, nausea, vomiting, diarrhea, constipation, hematochezia, melena	  Musculoskeletal: No muscle pain, weakness, joint swelling or pain  Neurological: No headache, difficulty eating/speaking, weakness, tingling	  Psychiatric: +depression, + difficulty concentrating ,+ sleep disturbance, + anhedonia, + SI (but not presently)	  Hematology/Lymphatics: No easy bleeding/brusing  Endocrine: No increased thirst, need for urination, heat/cold intolerance

## 2020-08-14 NOTE — BEHAVIORAL HEALTH ASSESSMENT NOTE - RISK ASSESSMENT
Moderate Acute Suicide Risk pt is at moderate acute risk for suicide. risk factors include etoh withdrawal, hx of depression, sense of loneliness, male, unemployed. protective factors include future oriented, hopeful, treatment seeking, high educational attainment pt is at moderate acute risk for suicide. risk factors include etoh withdrawal, hx of depression, sense of loneliness, male, unemployed. protective factors include future oriented, hopeful, treatment seeking, high educational attainment, denies SI and HI    In my opinion, at this time patient is not at acute risk of harm to self or others at this time. Will continue to follow

## 2020-08-14 NOTE — ED ADULT NURSE REASSESSMENT NOTE - NS ED NURSE REASSESS COMMENT FT1
Reassessment vitals as noted; psychiatric attending attempted interview but pt falling asleep during interview.

## 2020-08-14 NOTE — H&P ADULT - NSHPLABSRESULTS_GEN_ALL_CORE
.  LABS:                         14.1   6.49  )-----------( 246      ( 14 Aug 2020 09:03 )             45.0     08-14    147<H>  |  109<H>  |  10  ----------------------------<  125<H>  3.9   |  22  |  0.75    Ca    8.5      14 Aug 2020 09:03  Phos  2.9     08-14  Mg     1.9     08-14    TPro  6.8  /  Alb  4.1  /  TBili  < 0.2<L>  /  DBili  x   /  AST  46<H>  /  ALT  37  /  AlkPhos  58  08-14    Lipase, Serum (20 @ 09:03)    Lipase, Serum: 11.4 U/L    Beta Hydroxy-Butyrate (20 @ 22:45)    Beta Hydroxy-Butyrate: 0.1 mmol/L    Blood Gas Venous - Lactate: 3.0   Blood Gas Venous - Lactate: 3.8  Blood Gas Venous - Lactate: 3.5    Toxicology Screen, Drugs of Abuse, Urine (20 @ 01:00)    Phencyclidine Level, Urine: NEGATIVE    Amphetamine, Urine: NEGATIVE    Barbiturates Screen, Urine: NEGATIVE    Benzodiazepine, Urine: POSITIVE    Cannabinoids, Urine: NEGATIVE    Cocaine Metabolite, Urine: NEGATIVE    Methadone, Urine: NEGATIVE    Opiate, Urine: NEGATIVE    Oxycodone, Urine: NEGATIVE:     Toxicology Screen, Drugs of Abuse, Serum (20 @ 22:45)    Ethanol, Whole Blood: 197:       Urinalysis Basic - ( 14 Aug 2020 01:40 )    Color: COLORLESS / Appearance: CLEAR / S.009 / pH: 7.0  Gluc: NEGATIVE / Ketone: TRACE  / Bili: NEGATIVE / Urobili: NORMAL   Blood: NEGATIVE / Protein: NEGATIVE / Nitrite: NEGATIVE   Leuk Esterase: NEGATIVE / RBC: x / WBC x   Sq Epi: x / Non Sq Epi: x / Bacteria: x      RADIOLOGY, EKG & ADDITIONAL TESTS: Reviewed.

## 2020-08-14 NOTE — ED ADULT NURSE REASSESSMENT NOTE - NS ED NURSE REASSESS COMMENT FT1
Patient moved from room 20 to  for suicidal ideations. Patient medically cleared by MD Cheng. Charge RN notified.

## 2020-08-14 NOTE — H&P ADULT - PROBLEM SELECTOR PLAN 9
Transitions of Care Status:  1.  Name of PCP: Satish Hernandez   2.  PCP Contacted on Admission: [ ] Y    [x] N  - office closed   3.  PCP contacted at Discharge: [ ] Y    [ ] N    [ ] N/A  4.  Post-Discharge Appointment Date and Location:  5.  Summary of Handoff given to PCP:

## 2020-08-14 NOTE — PHARMACOTHERAPY INTERVENTION NOTE - COMMENTS
Medication history is incomplete. Discrepancies noted in provider handoff. Please call spectra [phone] if you have any questions.

## 2020-08-14 NOTE — BEHAVIORAL HEALTH ASSESSMENT NOTE - SUICIDE PROTECTIVE FACTORS
Identifies reasons for living/Has future plans/Frustration tolerance Identifies reasons for living/Has future plans/Supportive social network of family or friends/Cultural, spiritual and/or moral attitudes against suicide/Frustration tolerance/Sikh beliefs

## 2020-08-14 NOTE — H&P ADULT - NSHPPHYSICALEXAM_GEN_ALL_CORE
*PHYSICAL EXAM:*    Vital Signs Last 24 Hrs  T(C): 36.4 (14 Aug 2020 14:14), Max: 37.1 (14 Aug 2020 03:18)  T(F): 97.6 (14 Aug 2020 14:14), Max: 98.7 (14 Aug 2020 03:18)  HR: 92 (14 Aug 2020 14:14) (83 - 95)  BP: 142/85 (14 Aug 2020 14:14) (101/66 - 159/90)  RR: 20 (14 Aug 2020 14:14) (16 - 20)  SpO2: 100% (14 Aug 2020 14:14) (97% - 100%)    Constitutional: WDWN resting comfortably in bed; NAD  Head: NC/AT  Eyes: PERRL, EOMI, anicteric sclera  ENT: no nasal discharge; uvula midline, no oropharyngeal erythema or exudates; MMM  Neck: supple; no JVD or thyromegaly  Respiratory: CTA B/L; no W/R/R, no retractions  Cardiac: +S1/S2; RRR; no M/R/G; PMI non-displaced  Gastrointestinal: abdomen soft, NT/ND; no rebound or guarding; +BSx4  Genitourinary: normal external genitalia  Back: spine midline, no bony tenderness or step-offs; no CVAT B/L  Extremities: WWP, no clubbing or cyanosis; no peripheral edema  Musculoskeletal: NROM x4; no joint swelling, tenderness or erythema  Vascular: 2+ radial, femoral, DP/PT pulses B/L  Dermatologic: skin warm, dry and intact; no rashes, wounds, or scars  Lymphatic: no submandibular or cervical LAD  Neurologic: AAOx3; CNII-XII grossly intact; no focal deficits  Psychiatric: affect and characteristics of appearance, verbalizations, behaviors are appropriate *PHYSICAL EXAM:*    Vital Signs Last 24 Hrs  T(C): 36.4 (14 Aug 2020 14:14), Max: 37.1 (14 Aug 2020 03:18)  T(F): 97.6 (14 Aug 2020 14:14), Max: 98.7 (14 Aug 2020 03:18)  HR: 92 (14 Aug 2020 14:14) (83 - 95)  BP: 142/85 (14 Aug 2020 14:14) (101/66 - 159/90)  RR: 20 (14 Aug 2020 14:14) (16 - 20)  SpO2: 100% (14 Aug 2020 14:14) (97% - 100%)    Constitutional: WDWN resting comfortably in bed; NAD  Head: NC/AT  Eyes: PERRL, EOMI, anicteric sclera  Respiratory: CTA B/L; no W/R/R, no retractions  Cardiac: +S1/S2; RRR; no M/R/G; PMI non-displaced  Gastrointestinal: abdomen soft, NT/ND; no rebound or guarding  Back: spine midline, no bony tenderness or step-offs; no CVAT B/L  Extremities: WWP, no clubbing or cyanosis; no peripheral edema  Musculoskeletal: moving all extremities, normal strength  Vascular: 2+ peripheral pulses B/L  Dermatologic: skin warm, dry and intact; no rashes, wounds, or scars  Lymphatic: no submandibular or cervical LAD  Neurologic: AAOx3; no focal deficits  Psychiatric: affect dysthymic, tearful at times, speech low, no psychomotor slowing appreciated

## 2020-08-14 NOTE — BEHAVIORAL HEALTH ASSESSMENT NOTE - NSBHCHARTREVIEWVS_PSY_A_CORE FT
T(C): 36.4 (08-14-20 @ 14:14), Max: 37.1 (08-14-20 @ 03:18)  HR: 92 (08-14-20 @ 14:14) (83 - 95)  BP: 142/85 (08-14-20 @ 14:14) (101/66 - 159/90)  RR: 20 (08-14-20 @ 14:14) (16 - 20)  SpO2: 100% (08-14-20 @ 14:14) (97% - 100%)

## 2020-08-14 NOTE — ED ADULT NURSE REASSESSMENT NOTE - NS ED NURSE REASSESS COMMENT FT1
Received patient from [facility] coverage RN, patient slurring speech on assessment, pt admits to drinking beer and vodka yesterday and drank 5 days straight. Waiting to obtain collateral from wife. Patient remains on 1:1 observation for safety at this time. Patient belongings across from 21. Safety implemented, both side rails up for safety. Bed set in lowest position. Will continue to monitior.

## 2020-08-14 NOTE — BEHAVIORAL HEALTH ASSESSMENT NOTE - CASE SUMMARY
Patient seen and evaluated with PGY2 Dr. Lorenzo, I agree with above assessment and plan, pt. calm, cooperative, AAOX3, thought process is linear and coherent. Pt. reported feeling depressed in the context of multiple psychosocial stressors. Patient denies acute psychotic sxs, adamantly denies SI and HI. Patient stated that he wants to get better, wants to be sober again, wants to enjoy his life. Pt. reported h/o alcohol withdrawal , denies h/o seizures or Dts. Mild tremors noted on exam, no sweating noted. Recommend Librium taper as written above with Ativan PRN as per ADENIKE,  case discussed with team Dr. Mendoza in person, recs given will follow

## 2020-08-14 NOTE — H&P ADULT - PROBLEM SELECTOR PLAN 2
History of suicidal ideation according to triage note and ED provider note. patient currently denying due to calming effects of librium.   -behavioral health consult regarding need for 1:1 and admission to [facility] after withdrawal

## 2020-08-14 NOTE — H&P ADULT - NSHPSOCIALHISTORY_GEN_ALL_CORE
Daily alcohol use  Uses benzodiazepines a times  Denies use of other recreational drugs  Hx of smoking but quit ten years ago due to asthma (but will sometimes still have cigarettes)  Lives with girlfriend, not close with family

## 2020-08-14 NOTE — ED ADULT NURSE REASSESSMENT NOTE - NS ED NURSE REASSESS COMMENT FT1
0840 Received report from night RN SS, pt lynn denies si/hi/avh presently 0840 Received report from night RN KELSEY, pt calm denies si/hi/avh presently, Dr Barker at bedside as per MD pt needs to go to the main for medical admission report giving to receiving GAYATHRI Quintero.

## 2020-08-14 NOTE — ED BEHAVIORAL HEALTH NOTE - BEHAVIORAL HEALTH NOTE
Patient with HX of depression and sedatives and alcohol dependence . was called to see patient for depression and suicidality. Patient received Librium 50 mg and falling asleep during the interview, He states that he had too much to drink, closes his eyes and falls asleep. Utox is + for benzo and BAL was 197 at 11 pm last night. Patient is unable to answer questions, falls asleep. He is not interviewable at present time.

## 2020-08-14 NOTE — H&P ADULT - ATTENDING COMMENTS
Patient seen and examined, chart and labs reviewed. Case discussed with house staff.     59M history of asthma, HTN, Dyslipidemia, GERD, ETOH abuse (no history of DTs/seizures) presenting with binge drinking episode, admitted with ETOH withdrawal and SI. Patient states that he binged vodka, beer, per report patient also drank bottle of rubbing alcohol (he denies). He also states he took "20 pills" of benzos (states old Rx,  6 years ago, not suicidal intent, took to calm the tremors). Right now he endorses feeling guilty, depressed but denies SI/HI. On exam, VSS. He is tremulous in his bilateral UE, +tongue fasciculations, but calm/cooperative with depressed affect.  Labs notable for mild hypernatremia to 147 and elevated lactate (3.0 from 3.8 after IVF). Utox +Benzos, ETOH level 197. EKG NSR. CT head wnl.     #ETOH Withdrawal - patient with elevated CIWA at 7 in ED, given Librium. ETOH level elevated at 197. Discussed with psych Dr. Cleary, recommending librium taper starting at 50mg q8h. Thiamine IV, MV, folate. SW consult.     #Passive SI - patient reports depressed mood in setting of losing his job and had passive SI while in ED. Now he denies SI, but reports that he took "20 pills of benzos" a few days ago to help him relax, not for suicidial intent. Discussed with psych Dr. Cleary, patient is forward thinking and wants to get better. Low concern for SI, no need for 1:1.     #Elevated lactate - likely in setting of ETOH use. S/p 4L IV in ED, will encourage PO intake and recheck lactate in AM.

## 2020-08-14 NOTE — BEHAVIORAL HEALTH ASSESSMENT NOTE - SUMMARY
Patient is a 60 y/o Male, with a PmHx of Asthma, ETOH Abuse, HTN, HLD, Gerd, admitted for suicidality and ETOH/benzo withdrawal.   8/14: Today pt assessed for benzo/etoh withdrawal. pt expresses depressed sxs including depressed mood, anhedonia, difficulty sleeping, decreased appetite, feelings of guilt. says he drank an unknown quanity of beer over past 4 days and unknwon about of benzo prior to admission. pt denies si/hi/avh  BAL in , given Librium 50mg in ED. Recently in [facility] for similar presentation, responded well to Librium taper. No acute safety concerns, pt future oriented, hopeful, treatment seeking    plan  - START Librium taper for ETOH/benzo withdrawal. Recommend starting Librium 50mg q6h x4 doses  - CIWA protocal with sxs triggered Ativan  - No acute safety concerns at this time, no need for CO  - May discuss further affective mood medications at later point Patient is a 58 y/o Male, with a PmHx of Asthma, ETOH Abuse, HTN, HLD, Gerd, admitted for suicidality and ETOH/benzo withdrawal.     8/14: Today pt assessed for benzo/etoh withdrawal. pt expresses depressed sxs including depressed mood, anhedonia, difficulty sleeping, decreased appetite, feelings of guilt. says he drank an unknown quanity of beer over past 4 days and unknown about of benzo prior to admission. pt denies si/hi/avh  BAL in , given Librium 50mg in ED. Recently in [facility] for similar presentation, responded well to Librium taper. No acute safety concerns, pt future oriented, hopeful, treatment seeking    plan  - START Librium taper for ETOH/benzo withdrawal. Recommend starting Librium 50mg q6h x4 doses, then Librioum 50mg po q8h x3 doses,  then 50mg po BID x 2 doses, then 50mg once  ****Please monitor for alcohol/benzo. withdrawal closely and adjust the librium taper accordingly.****  - CIWA protocal with sxs triggered Ativan  - No acute safety concerns at this time, no need for CO from psychiatric perspective  - Thiamine 500mg IV tid for 2-3 days.   - May discuss further affective mood medications at later point, once pt. is not in active withdrawal. Patient is a 58 y/o Male, with a PmHx of Asthma, ETOH Abuse, HTN, HLD, Gerd, admitted for suicidality and ETOH/benzo withdrawal.     8/14: Today pt assessed for benzo/etoh withdrawal. pt expresses depressed sxs including depressed mood, anhedonia, difficulty sleeping, decreased appetite, feelings of guilt. says he drank an unknown quanity of beer over past 4 days and unknown about of benzo prior to admission. pt denies si/hi/avh  BAL in , given Librium 50mg in ED. Recently in [facility] for similar presentation, responded well to Librium taper. No acute safety concerns, pt future oriented, hopeful, treatment seeking    plan  - START Librium taper for ETOH/benzo withdrawal. Recommend starting Librium 50mg q6h x4 doses, then Librium 50mg po q8h x3 doses,  then 50mg po BID x 2 doses, then 50mg once  ****Please monitor for alcohol/benzo. withdrawal closely and adjust the librium taper accordingly.****  - CIWA protocal with sxs triggered Ativan  - No acute safety concerns at this time, no need for CO from psychiatric perspective  - Thiamine 500mg IV tid for 2-3 days.   - May discuss further affective mood medications at later point, once pt. is not in active withdrawal.

## 2020-08-14 NOTE — H&P ADULT - PROBLEM SELECTOR PLAN 1
One day since last drink. CIWA in ER 6-7. High risk due to history of prior withdrawals, large episode of binge drinking. No history of seizure or ICU admission.  -Librium taper  -Symptom triggered ativan  -Can give more ativan if acutely agitated  -Thiamine 500q8 IV x3 days then 100mg po

## 2020-08-14 NOTE — H&P ADULT - NSICDXPASTMEDICALHX_GEN_ALL_CORE_FT
PAST MEDICAL HISTORY:  Asthma     Chronic GERD     Gastritis     Gout     History of alcohol use disorder     Hyperlipidemia     Hypertension

## 2020-08-14 NOTE — H&P ADULT - NSICDXFAMILYHX_GEN_ALL_CORE_FT
FAMILY HISTORY:  Family history of diabetes mellitus, Father.  FH: breast cancer, Mother  FHx: heart disease, Father  of cardiac cause at age 45

## 2020-08-14 NOTE — BEHAVIORAL HEALTH ASSESSMENT NOTE - ACTIVATING EVENTS/STRESSORS
Perceived burden on family or others/Substance intoxication or withdrawal/Inadequate social supports Acute medical problem/Substance intoxication or withdrawal/Inadequate social supports

## 2020-08-14 NOTE — ED ADULT NURSE REASSESSMENT NOTE - NS ED NURSE REASSESS COMMENT FT1
received report from gina TRINH. Pt is a/o x 3. no complaints of chest pain, headache, dizzniness, vomiting, SOB, fever, chills   verbalized.  pt noted to have tremors and felt slightly nauseous. Dr herbert made aware and at bedside evaluating pt.  Awaiting further orders. Will continue to monitor.

## 2020-08-14 NOTE — BEHAVIORAL HEALTH ASSESSMENT NOTE - HPI (INCLUDE ILLNESS QUALITY, SEVERITY, DURATION, TIMING, CONTEXT, MODIFYING FACTORS, ASSOCIATED SIGNS AND SYMPTOMS)
Patient is a 60 y/o Male, with a PmHx of Asthma, ETOH Abuse, HTN, HLD, Gerd, admitted for suicidality and ETOH/benzo withdrawal. Pt seen at bedside, AOx3 pt says he came to the hospital because he finished binge drinking and taking some his home benzos and felt like he needed help. Pt has been feeling depressed for 9 years, after losing his wife and children and nephew(?0 to death and divorce. Pt reports previously having hobbies like electronics and cars, none of which he has interest in anymore. Pt ahs been losing weight over past few months, feels 'miserable' all the time. Pt says he lives with his girlfriend and that she is having a hard time with his drinking and bad habits. Pt denies SI/HI/AVH at this time. Says he never wanted to hurt himself,  only that he wants the addiction to etoh to stop. Pt says he is tired of feeling this way and wants to 'get my life back on track." Pt says he also takes benzos at night to help him sleep, typically 10mg, though today he took 20-30mg (told primary team he took 20-30 pills). pt brought these back from [facility] about 8 yrs ago, is convinced they have  and are not effective. pt is unsure of the brand, saying its a 'blue pill.'  Pt says he is a binge drinker, just completed 4-5 day binge. unclear how much pt consumed, says he bought 2 six packs and 2 of the bottles broke, but unclear how much he consumed and in what period of time.   Pt say eh used to work as an , made good money and had a satisfying career. Says that he started drinking several years ago and things got out of control. Pt denies ever being admitted to [facility] hospital, no previous si/sa. Has been in hospital before for etoh detox, finished librium taper. never had seizures, no DTs.  ON exam denies headache, nausea, avh, chills. +moderate tremor, no tongue fasiculations Patient is a 58 y/o Male, with a PmHx of Asthma, ETOH Abuse, HTN, HLD, Gerd, admitted for passive suicidality and ETOH/benzo withdrawal. Pt seen at bedside, AOx3 pt says he came to the hospital because he finished binge drinking and taking some his home benzos and felt like he needed help. Pt has been feeling depressed for 9 years, after losing his wife and children and nephew(? to death and divorce). Pt reports previously having hobbies like electronics and cars, none of which he has interest in anymore. Pt ahs been losing weight over past few months, feels 'miserable' all the time. Pt says he lives with his girlfriend and that she is having a hard time with his drinking and bad habits. Pt denies SI/HI/AVH at this time. Says he never wanted to hurt himself,  only that he wants the addiction to etoh to stop. Pt says he is tired of feeling this way and wants to 'get my life back on track." Pt says he also takes benzos at night to help him sleep, typically 10mg, though today he took 20-30mg (told primary team he took 20-30 pills).  pt brought these back from [facility] about 8 yrs ago, is convinced they have  and are not effective. pt is unsure of the brand, saying its a 'blue pill.' Adamantly denies this to be SA.  Pt says he is a binge drinker, just completed 4-5 day binge. unclear how much pt consumed, says he bought 2 six packs and 2 of the bottles broke, but unclear how much he consumed and in what period of time.   Pt say he used to work as an , made good money and had a satisfying career. Says that he started drinking several years ago and things got out of control. Pt denies ever being admitted to [facility] hospital, no previous si/sa. Has been in hospital before for etoh detox, finished librium taper. never had seizures, no DTs.  ON exam denies headache, nausea, avh, chills. +moderate tremor, no tongue fasciculations  Pt. not interested in antidepressant at this time.

## 2020-08-14 NOTE — ED ADULT NURSE REASSESSMENT NOTE - NS ED NURSE REASSESS COMMENT FT1
Pt transferred to main ED from  with c/o alcohol withdrawal. Pt relates last drink was approx 24 hours ago, pt relates drinking approx 12 beers per day for the past week. Pt denies past medical hx. Pt relates SI 3 days ago, states no longer feeling suicidal. CIWA score- 7. Pt denies A/V hallucinations. #20ga IV placed to R forearm, labs drawn as ordered, IV bolus administered. Pt placed on cardiac monitor. Will continue to monitor.

## 2020-08-14 NOTE — H&P ADULT - ASSESSMENT
59 year old man with PMH of AUD c/b withdrawal, major depression, HTN, HLD, asthma, GERD who presents to the hospital with acute alcohol intoxication and suicidal ideation admitted for alcohol withdrawal.

## 2020-08-14 NOTE — H&P ADULT - HISTORY OF PRESENT ILLNESS
Mr. Arias is a 58 yo man with a PMH of Alcohol Use Disorder, MDD, HTN, GERD, HLD, gout, and asthma who presented to [facility] ED with intoxication on 8/13. Patient's girlfriend was concerned that patient was intoxicated and depressed so she called EMS. She took away his keys and when she wouldn't get him more alcohol he joked that he would drink the hand-. Patient states he drank at least 12 beers and 10 bottles of vodka over the last 1-2 weeks. He states he also took an unknown but high number of unknown benzodiazepines which were prescribed to him a number of years ago in [facility]. He endorses feeling depressed for a long time, stating he lost his parents at a young age as well as his wife. He additionally lost his job as an  in 2008 and has reported drinking heavily since then. 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 At this time he endorses feelings of sadness, difficulty sleeping, difficulty with concentration, anhedonia, and occasional suicidal/self-harm thoughts Of note patient reports history of alcohol withdrawals but never required ICU admission or c/b siezures. For his MDD he was prescribed mirtazipine but he never took it for fears of the side effect "may increase thoughts of suicide". Patient had endorsed suicidal ideation while in the ED to several people but was without intent or plan. Upon our questioning he is denying current SI. Patient also endorses having COVID in March. his girlfriend tested positive and he had the same symptoms but was never tested. Patientt denies headaches, abdominal pain, nausea, vomiting, chest pain, numbness/tingling, SOB, fever, chills, auditory/visual hallucinations, and seizures.

## 2020-08-14 NOTE — BEHAVIORAL HEALTH ASSESSMENT NOTE - NSBHCHARTREVIEWLAB_PSY_A_CORE FT
How Severe Is Your Cyst?: mild
Is This A New Presentation, Or A Follow-Up?: Cyst
CBC Full  -  ( 14 Aug 2020 09:03 )  WBC Count : 6.49 K/uL  RBC Count : 4.90 M/uL  Hemoglobin : 14.1 g/dL  Hematocrit : 45.0 %  Platelet Count - Automated : 246 K/uL  Mean Cell Volume : 91.8 fL  Mean Cell Hemoglobin : 28.8 pg  Mean Cell Hemoglobin Concentration : 31.3 %  Auto Neutrophil # : x  Auto Lymphocyte # : x  Auto Monocyte # : x  Auto Eosinophil # : x  Auto Basophil # : x  Auto Neutrophil % : x  Auto Lymphocyte % : x  Auto Monocyte % : x  Auto Eosinophil % : x  Auto Basophil % : x    08-14    147<H>  |  109<H>  |  10  ----------------------------<  125<H>  3.9   |  22  |  0.75    Ca    8.5      14 Aug 2020 09:03  Phos  2.9     08-14  Mg     1.9     08-14    TPro  6.8  /  Alb  4.1  /  TBili  < 0.2<L>  /  DBili  x   /  AST  46<H>  /  ALT  37  /  AlkPhos  58  08-14    LIVER FUNCTIONS - ( 14 Aug 2020 09:03 )  Alb: 4.1 g/dL / Pro: 6.8 g/dL / ALK PHOS: 58 u/L / ALT: 37 u/L / AST: 46 u/L / GGT: x           Urinalysis Basic - ( 14 Aug 2020 01:40 )    Color: COLORLESS / Appearance: CLEAR / S.009 / pH: 7.0  Gluc: NEGATIVE / Ketone: TRACE  / Bili: NEGATIVE / Urobili: NORMAL   Blood: NEGATIVE / Protein: NEGATIVE / Nitrite: NEGATIVE   Leuk Esterase: NEGATIVE / RBC: x / WBC x   Sq Epi: x / Non Sq Epi: x / Bacteria: x

## 2020-08-15 DIAGNOSIS — R10.9 UNSPECIFIED ABDOMINAL PAIN: ICD-10-CM

## 2020-08-15 LAB
ALBUMIN SERPL ELPH-MCNC: 3.7 G/DL — SIGNIFICANT CHANGE UP (ref 3.3–5)
ALP SERPL-CCNC: 65 U/L — SIGNIFICANT CHANGE UP (ref 40–120)
ALT FLD-CCNC: 37 U/L — SIGNIFICANT CHANGE UP (ref 4–41)
ANION GAP SERPL CALC-SCNC: 15 MMO/L — HIGH (ref 7–14)
ANION GAP SERPL CALC-SCNC: 15 MMO/L — HIGH (ref 7–14)
AST SERPL-CCNC: 63 U/L — HIGH (ref 4–40)
BASOPHILS # BLD AUTO: 0.08 K/UL — SIGNIFICANT CHANGE UP (ref 0–0.2)
BASOPHILS NFR BLD AUTO: 1.2 % — SIGNIFICANT CHANGE UP (ref 0–2)
BILIRUB SERPL-MCNC: 0.5 MG/DL — SIGNIFICANT CHANGE UP (ref 0.2–1.2)
BUN SERPL-MCNC: 8 MG/DL — SIGNIFICANT CHANGE UP (ref 7–23)
BUN SERPL-MCNC: 9 MG/DL — SIGNIFICANT CHANGE UP (ref 7–23)
CALCIUM SERPL-MCNC: 8.1 MG/DL — LOW (ref 8.4–10.5)
CALCIUM SERPL-MCNC: 8.1 MG/DL — LOW (ref 8.4–10.5)
CHLORIDE SERPL-SCNC: 107 MMOL/L — SIGNIFICANT CHANGE UP (ref 98–107)
CHLORIDE SERPL-SCNC: 108 MMOL/L — HIGH (ref 98–107)
CO2 SERPL-SCNC: 17 MMOL/L — LOW (ref 22–31)
CO2 SERPL-SCNC: 21 MMOL/L — LOW (ref 22–31)
CREAT SERPL-MCNC: 0.79 MG/DL — SIGNIFICANT CHANGE UP (ref 0.5–1.3)
CREAT SERPL-MCNC: 0.88 MG/DL — SIGNIFICANT CHANGE UP (ref 0.5–1.3)
CULTURE RESULTS: SIGNIFICANT CHANGE UP
EOSINOPHIL # BLD AUTO: 0.4 K/UL — SIGNIFICANT CHANGE UP (ref 0–0.5)
EOSINOPHIL NFR BLD AUTO: 5.9 % — SIGNIFICANT CHANGE UP (ref 0–6)
GLUCOSE SERPL-MCNC: 115 MG/DL — HIGH (ref 70–99)
GLUCOSE SERPL-MCNC: 128 MG/DL — HIGH (ref 70–99)
HCT VFR BLD CALC: 39.1 % — SIGNIFICANT CHANGE UP (ref 39–50)
HGB BLD-MCNC: 13.1 G/DL — SIGNIFICANT CHANGE UP (ref 13–17)
IMM GRANULOCYTES NFR BLD AUTO: 0.3 % — SIGNIFICANT CHANGE UP (ref 0–1.5)
LACTATE SERPL-SCNC: 1.4 MMOL/L — SIGNIFICANT CHANGE UP (ref 0.5–2)
LYMPHOCYTES # BLD AUTO: 3.19 K/UL — SIGNIFICANT CHANGE UP (ref 1–3.3)
LYMPHOCYTES # BLD AUTO: 47.4 % — HIGH (ref 13–44)
MAGNESIUM SERPL-MCNC: 1.7 MG/DL — SIGNIFICANT CHANGE UP (ref 1.6–2.6)
MAGNESIUM SERPL-MCNC: 2.1 MG/DL — SIGNIFICANT CHANGE UP (ref 1.6–2.6)
MCHC RBC-ENTMCNC: 29.9 PG — SIGNIFICANT CHANGE UP (ref 27–34)
MCHC RBC-ENTMCNC: 33.5 % — SIGNIFICANT CHANGE UP (ref 32–36)
MCV RBC AUTO: 89.3 FL — SIGNIFICANT CHANGE UP (ref 80–100)
MONOCYTES # BLD AUTO: 0.56 K/UL — SIGNIFICANT CHANGE UP (ref 0–0.9)
MONOCYTES NFR BLD AUTO: 8.3 % — SIGNIFICANT CHANGE UP (ref 2–14)
NEUTROPHILS # BLD AUTO: 2.48 K/UL — SIGNIFICANT CHANGE UP (ref 1.8–7.4)
NEUTROPHILS NFR BLD AUTO: 36.9 % — LOW (ref 43–77)
NRBC # FLD: 0 K/UL — SIGNIFICANT CHANGE UP (ref 0–0)
PHOSPHATE SERPL-MCNC: 2.4 MG/DL — LOW (ref 2.5–4.5)
PHOSPHATE SERPL-MCNC: 2.5 MG/DL — SIGNIFICANT CHANGE UP (ref 2.5–4.5)
PLATELET # BLD AUTO: 204 K/UL — SIGNIFICANT CHANGE UP (ref 150–400)
PMV BLD: 9.7 FL — SIGNIFICANT CHANGE UP (ref 7–13)
POTASSIUM SERPL-MCNC: 2.9 MMOL/L — CRITICAL LOW (ref 3.5–5.3)
POTASSIUM SERPL-MCNC: 4.1 MMOL/L — SIGNIFICANT CHANGE UP (ref 3.5–5.3)
POTASSIUM SERPL-SCNC: 2.9 MMOL/L — CRITICAL LOW (ref 3.5–5.3)
POTASSIUM SERPL-SCNC: 4.1 MMOL/L — SIGNIFICANT CHANGE UP (ref 3.5–5.3)
PROT SERPL-MCNC: 6.1 G/DL — SIGNIFICANT CHANGE UP (ref 6–8.3)
RBC # BLD: 4.38 M/UL — SIGNIFICANT CHANGE UP (ref 4.2–5.8)
RBC # FLD: 12.3 % — SIGNIFICANT CHANGE UP (ref 10.3–14.5)
SARS-COV-2 IGG SERPL QL IA: POSITIVE
SARS-COV-2 IGM SERPL IA-ACNC: 8.58 RATIO — HIGH
SODIUM SERPL-SCNC: 140 MMOL/L — SIGNIFICANT CHANGE UP (ref 135–145)
SODIUM SERPL-SCNC: 143 MMOL/L — SIGNIFICANT CHANGE UP (ref 135–145)
SPECIMEN SOURCE: SIGNIFICANT CHANGE UP
WBC # BLD: 6.73 K/UL — SIGNIFICANT CHANGE UP (ref 3.8–10.5)
WBC # FLD AUTO: 6.73 K/UL — SIGNIFICANT CHANGE UP (ref 3.8–10.5)

## 2020-08-15 PROCEDURE — 99233 SBSQ HOSP IP/OBS HIGH 50: CPT | Mod: GC

## 2020-08-15 RX ORDER — SIMETHICONE 80 MG/1
80 TABLET, CHEWABLE ORAL ONCE
Refills: 0 | Status: COMPLETED | OUTPATIENT
Start: 2020-08-15 | End: 2020-08-15

## 2020-08-15 RX ORDER — POTASSIUM CHLORIDE 20 MEQ
40 PACKET (EA) ORAL EVERY 4 HOURS
Refills: 0 | Status: COMPLETED | OUTPATIENT
Start: 2020-08-15 | End: 2020-08-15

## 2020-08-15 RX ORDER — SIMETHICONE 80 MG/1
80 TABLET, CHEWABLE ORAL EVERY 6 HOURS
Refills: 0 | Status: DISCONTINUED | OUTPATIENT
Start: 2020-08-15 | End: 2020-08-17

## 2020-08-15 RX ORDER — POTASSIUM PHOSPHATE, MONOBASIC POTASSIUM PHOSPHATE, DIBASIC 236; 224 MG/ML; MG/ML
15 INJECTION, SOLUTION INTRAVENOUS ONCE
Refills: 0 | Status: COMPLETED | OUTPATIENT
Start: 2020-08-15 | End: 2020-08-15

## 2020-08-15 RX ORDER — MAGNESIUM SULFATE 500 MG/ML
2 VIAL (ML) INJECTION ONCE
Refills: 0 | Status: COMPLETED | OUTPATIENT
Start: 2020-08-15 | End: 2020-08-15

## 2020-08-15 RX ORDER — SODIUM,POTASSIUM PHOSPHATES 278-250MG
1 POWDER IN PACKET (EA) ORAL
Refills: 0 | Status: DISCONTINUED | OUTPATIENT
Start: 2020-08-15 | End: 2020-08-15

## 2020-08-15 RX ADMIN — Medication 50 GRAM(S): at 12:34

## 2020-08-15 RX ADMIN — Medication 40 MILLIEQUIVALENT(S): at 17:32

## 2020-08-15 RX ADMIN — Medication 105 MILLIGRAM(S): at 15:11

## 2020-08-15 RX ADMIN — POTASSIUM PHOSPHATE, MONOBASIC POTASSIUM PHOSPHATE, DIBASIC 62.5 MILLIMOLE(S): 236; 224 INJECTION, SOLUTION INTRAVENOUS at 13:32

## 2020-08-15 RX ADMIN — SIMETHICONE 80 MILLIGRAM(S): 80 TABLET, CHEWABLE ORAL at 22:10

## 2020-08-15 RX ADMIN — Medication 40 MILLIEQUIVALENT(S): at 13:52

## 2020-08-15 RX ADMIN — SIMETHICONE 80 MILLIGRAM(S): 80 TABLET, CHEWABLE ORAL at 02:42

## 2020-08-15 RX ADMIN — SIMETHICONE 80 MILLIGRAM(S): 80 TABLET, CHEWABLE ORAL at 06:54

## 2020-08-15 RX ADMIN — Medication 105 MILLIGRAM(S): at 22:45

## 2020-08-15 RX ADMIN — Medication 50 MILLIGRAM(S): at 13:52

## 2020-08-15 RX ADMIN — BUDESONIDE AND FORMOTEROL FUMARATE DIHYDRATE 2 PUFF(S): 160; 4.5 AEROSOL RESPIRATORY (INHALATION) at 21:22

## 2020-08-15 RX ADMIN — SIMETHICONE 80 MILLIGRAM(S): 80 TABLET, CHEWABLE ORAL at 12:33

## 2020-08-15 RX ADMIN — Medication 50 MILLIGRAM(S): at 02:42

## 2020-08-15 RX ADMIN — BUDESONIDE AND FORMOTEROL FUMARATE DIHYDRATE 2 PUFF(S): 160; 4.5 AEROSOL RESPIRATORY (INHALATION) at 08:33

## 2020-08-15 RX ADMIN — Medication 1 MILLIGRAM(S): at 12:33

## 2020-08-15 RX ADMIN — PANTOPRAZOLE SODIUM 40 MILLIGRAM(S): 20 TABLET, DELAYED RELEASE ORAL at 06:12

## 2020-08-15 RX ADMIN — Medication 50 MILLIGRAM(S): at 08:32

## 2020-08-15 RX ADMIN — Medication 105 MILLIGRAM(S): at 06:13

## 2020-08-15 RX ADMIN — LISINOPRIL 5 MILLIGRAM(S): 2.5 TABLET ORAL at 06:13

## 2020-08-15 RX ADMIN — Medication 50 MILLIGRAM(S): at 21:20

## 2020-08-15 RX ADMIN — Medication 81 MILLIGRAM(S): at 12:33

## 2020-08-15 RX ADMIN — MONTELUKAST 10 MILLIGRAM(S): 4 TABLET, CHEWABLE ORAL at 12:33

## 2020-08-15 RX ADMIN — Medication 40 MILLIEQUIVALENT(S): at 08:36

## 2020-08-15 NOTE — PROGRESS NOTE ADULT - PROBLEM SELECTOR PLAN 1
One day since last drink. CIWA in ER 6-7. High risk due to history of prior withdrawals, large episode of binge drinking. No history of seizure or ICU admission.  -CIWA 4-5 this AM  -Librium taper  -Can give more ativan if acutely agitated  -Thiamine 500q8 IV x3 days then 100mg po One day since last drink. CIWA in ER 6-7. High risk due to history of prior withdrawals, large episode of binge drinking. No history of seizure or ICU admission.  -CIWA 4-5 this AM  -c/w Librium taper  -Can give more ativan if acutely agitated  -Thiamine 500q8 IV x3 days then 100mg po

## 2020-08-15 NOTE — CHART NOTE - NSCHARTNOTEFT_GEN_A_CORE
Called for bloating/epigastric discomfort. Let's try simethicone 80mg once.    Available for any concerns    Mario

## 2020-08-15 NOTE — PROGRESS NOTE ADULT - PROBLEM SELECTOR PLAN 2
History of suicidal ideation according to triage note and ED provider note. patient currently denying due to calming effects of librium.   -appreciate psych recs: not currently at risk for self harm, d/c 1:1 Complaining of mild abdominal pain, attributes to gas, mostly resolves with simethicone. Lipase 15->11. Requesting soup  -clear liquid diet as per request  -low suspicion for pancreatitis  -ordered simethicone q6 PRN

## 2020-08-15 NOTE — CHART NOTE - NSCHARTNOTEFT_GEN_A_CORE
Patient is reporting epigastric discomfort and bloating. Would like to try simethicone again as it seemed to have helped. Simethicone 80mg once given.    Mario

## 2020-08-15 NOTE — PROGRESS NOTE ADULT - PROBLEM SELECTOR PLAN 4
c/w home meds, advair, montelukast, albuterol PRN In the ED 3.5-->3.8-->3.0. Likely type B lactic acidosis 2/2 EtOH  -1.4 this AM

## 2020-08-15 NOTE — PROGRESS NOTE ADULT - PROBLEM SELECTOR PLAN 7
Had flu-like illness in March. RU (RN) tested positive for covid  -covid IgG Ab in AM c/w home lisinopril

## 2020-08-15 NOTE — PROGRESS NOTE ADULT - PROBLEM SELECTOR PLAN 9
Transitions of Care Status:  1.  Name of PCP: Satish Hernandez   2.  PCP Contacted on Admission: [ ] Y    [x] N  - office closed   3.  PCP contacted at Discharge: [ ] Y    [ ] N    [ ] N/A  4.  Post-Discharge Appointment Date and Location:  5.  Summary of Handoff given to PCP: IMPROVE score 0  -SCDs only

## 2020-08-15 NOTE — PROGRESS NOTE ADULT - ATTENDING COMMENTS
59M history of asthma, HTN, Dyslipidemia, GERD, ETOH abuse (no history of DTs/seizures) presenting with binge drinking episode, admitted with ETOH withdrawal and S.     #ETOH Withdrawal - CIWA ~5 today. Has mild tremors. Has not required additional ativan pushes. C/w librium taper.     #Electrolyte abnormalities-  K 2.9, Mg 1.7. Likely 2/2 EtOH. Repletion. Recheck BMP in afternoon.     #Passive SI - patient reports depressed mood in setting of losing his job and had passive SI while in ED. Now he denies SI, but reports that he took "20 pills of benzos" a few days ago to help him relax, not for suicidial intent. Discussed with psych Dr. Cleary, patient is forward thinking and wants to get better. Low concern for SI, no need for 1:1.     I was physically present for the key portions of the evaluation and management (E/M) service provided.  I agree with the above history, physical, and plan which I have reviewed and edited where appropriate.     Plan discussed with patient/ HS1 Dr. Del Valle.

## 2020-08-15 NOTE — PROGRESS NOTE ADULT - PROBLEM SELECTOR PLAN 8
IMPROVE score 0  -SCDs only Had flu-like illness in March. RU (RN) tested positive for covid  -covid IgG Ab in AM

## 2020-08-15 NOTE — PROGRESS NOTE ADULT - SUBJECTIVE AND OBJECTIVE BOX
*INCOMPLETE NOTE*  *******************************************************  Medhat Del Valle MD PGY-1  Internal Medicine  Pager [phone] / [phone]  *******************************************************  Patient is a 59y old  Male who presents with a chief complaint of Alcohol withdrawal, self-harm ideation (14 Aug 2020 14:31)          NOTE TO BE UPDATED AFTER ROUNDS *******************************************************  Medhat Del Valle MD PGY-1  Internal Medicine  Pager [phone] / [phone]  *******************************************************  Patient is a 59y old  Male who presents with a chief complaint of Alcohol withdrawal, self-harm ideation (15 Aug 2020 08:02)      SUBJECTIVE / OVERNIGHT EVENTS:  - Patient seen and evaluated at bedside.  - No acute events overnight.   - Patient with some gas pain overnight, resolved with simethicone  - endorsing some difficulty eating, requesting clear liquid diet  - Denies fevers/chills, headache, SOB at rest, chest pain, palpitations, nausea/vomiting/diarrhea/constipation, dysuria    MEDICATIONS  (STANDING):  aspirin enteric coated 81 milliGRAM(s) Oral daily  budesonide 160 MICROgram(s)/formoterol 4.5 MICROgram(s) Inhaler 2 Puff(s) Inhalation two times a day  chlordiazePOXIDE   Oral   chlordiazePOXIDE 50 milliGRAM(s) Oral every 6 hours  chlordiazePOXIDE 50 milliGRAM(s) Oral every 8 hours  folic acid 1 milliGRAM(s) Oral daily  lisinopril 5 milliGRAM(s) Oral daily  magnesium sulfate  IVPB 2 Gram(s) IV Intermittent once  montelukast 10 milliGRAM(s) Oral daily  pantoprazole    Tablet 40 milliGRAM(s) Oral before breakfast  potassium chloride    Tablet ER 40 milliEquivalent(s) Oral every 4 hours  potassium phosphate IVPB 15 milliMole(s) IV Intermittent once  thiamine IVPB 500 milliGRAM(s) IV Intermittent every 8 hours    MEDICATIONS  (PRN):  ALBUTerol    90 MICROgram(s) HFA Inhaler 2 Puff(s) Inhalation every 6 hours PRN Shortness of Breath and/or Wheezing  simethicone 80 milliGRAM(s) Chew every 6 hours PRN Heartburn      CAPILLARY BLOOD GLUCOSE        I&O's Summary    Daily Height in cm: 180.34 (14 Aug 2020 18:54)    Daily     PHYSICAL EXAM:  Vital Signs Last 24 Hrs  T(C): 36.9 (15 Aug 2020 10:28), Max: 37.1 (14 Aug 2020 17:00)  T(F): 98.5 (15 Aug 2020 10:28), Max: 98.7 (14 Aug 2020 17:00)  HR: 72 (15 Aug 2020 10:28) (72 - 98)  BP: 125/76 (15 Aug 2020 10:28) (124/78 - 152/93)  RR: 18 (15 Aug 2020 10:28) (16 - 20)  SpO2: 98% (15 Aug 2020 10:28) (97% - 100%)    	Constitutional: WDWN resting comfortably in bed; NAD  	Head: NC/AT  	Eyes: PERRL, EOMI, anicteric sclera  	Respiratory: CTA B/L; no W/R/R, no retractions  	Cardiac: +S1/S2; RRR; no M/R/G; PMI non-displaced  	Gastrointestinal: abdomen soft, very mildly tender, nondistended; no rebound or guarding  	Extremities: WWP, no clubbing or cyanosis; no peripheral edema  	Musculoskeletal: moving all extremities, normal strength  	Vascular: 2+ peripheral pulses B/L  	Dermatologic: skin warm, dry and intact; no rashes, wounds, or scars  	Lymphatic: no submandibular or cervical LAD  	Neurologic: AAOx3; no focal deficits  	Psychiatric: affect dysthymic, speech low, no psychomotor slowing appreciated      LABS:                        13.1   6.73  )-----------( 204      ( 15 Aug 2020 06:10 )             39.1     -15    143  |  107  |  8   ----------------------------<  115<H>  2.9<LL>   |  21<L>  |  0.79    Ca    8.1<L>      15 Aug 2020 06:10  Phos  2.4     -15  Mg     1.7     08-15    TPro  6.1  /  Alb  3.7  /  TBili  0.5  /  DBili  x   /  AST  63<H>  /  ALT  37  /  AlkPhos  65  08-15    Lipase, Serum: 11.4 U/L (20 @ 09:03)  Lipase, Serum: 15.4 U/L (20 @ 22:45)        Urinalysis Basic - ( 14 Aug 2020 01:40 )    Color: COLORLESS / Appearance: CLEAR / S.009 / pH: 7.0  Gluc: NEGATIVE / Ketone: TRACE  / Bili: NEGATIVE / Urobili: NORMAL   Blood: NEGATIVE / Protein: NEGATIVE / Nitrite: NEGATIVE   Leuk Esterase: NEGATIVE / RBC: x / WBC x   Sq Epi: x / Non Sq Epi: x / Bacteria: x        Culture - Urine (collected 14 Aug 2020 02:40)  Source: .Urine Clean Catch (Midstream)  Final Report (15 Aug 2020 07:09):    <10,000 CFU/mL Normal Urogenital Tracy        RADIOLOGY & ADDITIONAL TESTS:  Results Reviewed:   Imaging Personally Reviewed:  Electrocardiogram Personally Reviewed:    COORDINATION OF CARE:  Care Discussed with Consultants/Other Providers [Y/N]:   Prior or Outpatient Records Reviewed [Y/N]:

## 2020-08-15 NOTE — PROGRESS NOTE ADULT - PROBLEM SELECTOR PLAN 3
In the ED 3.5-->3.8-->3.0. Likely type B lactic acidosis 2/2 EtOH  -1.4 this AM History of suicidal ideation according to triage note and ED provider note. patient currently denying due to calming effects of librium.   -appreciate psych recs: not currently at risk for self harm, d/c 1:1  -f/u behavioral health recs

## 2020-08-16 LAB
ANION GAP SERPL CALC-SCNC: 14 MMO/L — SIGNIFICANT CHANGE UP (ref 7–14)
BUN SERPL-MCNC: 8 MG/DL — SIGNIFICANT CHANGE UP (ref 7–23)
CALCIUM SERPL-MCNC: 8.5 MG/DL — SIGNIFICANT CHANGE UP (ref 8.4–10.5)
CHLORIDE SERPL-SCNC: 107 MMOL/L — SIGNIFICANT CHANGE UP (ref 98–107)
CO2 SERPL-SCNC: 19 MMOL/L — LOW (ref 22–31)
CREAT SERPL-MCNC: 0.74 MG/DL — SIGNIFICANT CHANGE UP (ref 0.5–1.3)
GLUCOSE SERPL-MCNC: 127 MG/DL — HIGH (ref 70–99)
HCT VFR BLD CALC: 38.6 % — LOW (ref 39–50)
HGB BLD-MCNC: 12.6 G/DL — LOW (ref 13–17)
MAGNESIUM SERPL-MCNC: 1.9 MG/DL — SIGNIFICANT CHANGE UP (ref 1.6–2.6)
MCHC RBC-ENTMCNC: 28.8 PG — SIGNIFICANT CHANGE UP (ref 27–34)
MCHC RBC-ENTMCNC: 32.6 % — SIGNIFICANT CHANGE UP (ref 32–36)
MCV RBC AUTO: 88.3 FL — SIGNIFICANT CHANGE UP (ref 80–100)
NRBC # FLD: 0 K/UL — SIGNIFICANT CHANGE UP (ref 0–0)
PHOSPHATE SERPL-MCNC: 1.9 MG/DL — LOW (ref 2.5–4.5)
PLATELET # BLD AUTO: 166 K/UL — SIGNIFICANT CHANGE UP (ref 150–400)
PMV BLD: 9.6 FL — SIGNIFICANT CHANGE UP (ref 7–13)
POTASSIUM SERPL-MCNC: 3.7 MMOL/L — SIGNIFICANT CHANGE UP (ref 3.5–5.3)
POTASSIUM SERPL-SCNC: 3.7 MMOL/L — SIGNIFICANT CHANGE UP (ref 3.5–5.3)
RBC # BLD: 4.37 M/UL — SIGNIFICANT CHANGE UP (ref 4.2–5.8)
RBC # FLD: 12 % — SIGNIFICANT CHANGE UP (ref 10.3–14.5)
SODIUM SERPL-SCNC: 140 MMOL/L — SIGNIFICANT CHANGE UP (ref 135–145)
WBC # BLD: 5 K/UL — SIGNIFICANT CHANGE UP (ref 3.8–10.5)
WBC # FLD AUTO: 5 K/UL — SIGNIFICANT CHANGE UP (ref 3.8–10.5)

## 2020-08-16 PROCEDURE — 99232 SBSQ HOSP IP/OBS MODERATE 35: CPT | Mod: GC

## 2020-08-16 RX ORDER — SODIUM,POTASSIUM PHOSPHATES 278-250MG
1 POWDER IN PACKET (EA) ORAL
Refills: 0 | Status: COMPLETED | OUTPATIENT
Start: 2020-08-16 | End: 2020-08-17

## 2020-08-16 RX ADMIN — BUDESONIDE AND FORMOTEROL FUMARATE DIHYDRATE 2 PUFF(S): 160; 4.5 AEROSOL RESPIRATORY (INHALATION) at 10:51

## 2020-08-16 RX ADMIN — Medication 1 PACKET(S): at 10:50

## 2020-08-16 RX ADMIN — Medication 105 MILLIGRAM(S): at 05:25

## 2020-08-16 RX ADMIN — Medication 81 MILLIGRAM(S): at 10:53

## 2020-08-16 RX ADMIN — Medication 105 MILLIGRAM(S): at 21:24

## 2020-08-16 RX ADMIN — Medication 50 MILLIGRAM(S): at 13:27

## 2020-08-16 RX ADMIN — ALBUTEROL 2 PUFF(S): 90 AEROSOL, METERED ORAL at 21:25

## 2020-08-16 RX ADMIN — SIMETHICONE 80 MILLIGRAM(S): 80 TABLET, CHEWABLE ORAL at 05:24

## 2020-08-16 RX ADMIN — SIMETHICONE 80 MILLIGRAM(S): 80 TABLET, CHEWABLE ORAL at 21:24

## 2020-08-16 RX ADMIN — Medication 105 MILLIGRAM(S): at 13:27

## 2020-08-16 RX ADMIN — Medication 1 TABLET(S): at 10:54

## 2020-08-16 RX ADMIN — MONTELUKAST 10 MILLIGRAM(S): 4 TABLET, CHEWABLE ORAL at 10:54

## 2020-08-16 RX ADMIN — Medication 50 MILLIGRAM(S): at 05:24

## 2020-08-16 RX ADMIN — BUDESONIDE AND FORMOTEROL FUMARATE DIHYDRATE 2 PUFF(S): 160; 4.5 AEROSOL RESPIRATORY (INHALATION) at 21:23

## 2020-08-16 RX ADMIN — PANTOPRAZOLE SODIUM 40 MILLIGRAM(S): 20 TABLET, DELAYED RELEASE ORAL at 06:18

## 2020-08-16 RX ADMIN — LISINOPRIL 5 MILLIGRAM(S): 2.5 TABLET ORAL at 05:24

## 2020-08-16 RX ADMIN — Medication 1 PACKET(S): at 17:11

## 2020-08-16 RX ADMIN — Medication 1 PACKET(S): at 21:24

## 2020-08-16 RX ADMIN — Medication 1 MILLIGRAM(S): at 10:54

## 2020-08-16 NOTE — PROGRESS NOTE ADULT - PROBLEM SELECTOR PLAN 1
One day since last drink. CIWA in ER 6-7. High risk due to history of prior withdrawals, large episode of binge drinking. No history of seizure or ICU admission.  -CIWA 4-5 this AM  -c/w Librium taper  -Can give more ativan if acutely agitated  -Thiamine 500q8 IV x3 days then 100mg po Last drink before admission. CIWA in ER 6-7. High risk due to history of prior withdrawals, large episode of binge drinking. No history of seizure or ICU admission.  -CIWA 1 this AM  -c/w Librium taper, tolerating well  -Can give more ativan if acutely agitated  -Thiamine 500q8 IV x3 days then 100mg po  - Added multivitamin

## 2020-08-16 NOTE — PROGRESS NOTE ADULT - ASSESSMENT
59 year old man with PMH of AUD c/b withdrawal, major depression, HTN, HLD, asthma, GERD who presents to the hospital with acute alcohol intoxication and suicidal ideation admitted for alcohol withdrawal. 59 year old man with PMH of AUD c/b withdrawal, major depression, HTN, HLD, asthma, GERD who presents to the hospital with acute alcohol intoxication and suicidal ideation admitted for alcohol withdrawal. On Librium taper + CIWA.

## 2020-08-16 NOTE — PROGRESS NOTE ADULT - PROBLEM SELECTOR PLAN 2
Complaining of mild abdominal pain, attributes to gas, mostly resolves with simethicone. Lipase 15->11. Requesting soup  -clear liquid diet as per request  -low suspicion for pancreatitis  -ordered simethicone q6 PRN Complaining of mild abdominal pain, attributes to gas, mostly resolves with simethicone. Lipase 15->11.   - Diet advanced per patient request  -low suspicion for pancreatitis  -ordered simethicone q6 PRN

## 2020-08-16 NOTE — PROGRESS NOTE ADULT - SUBJECTIVE AND OBJECTIVE BOX
PROGRESS NOTE:   Authoted by Dr. Kezia Marsh MD  Pager [phone] [facility], [id number] [facility]     Patient is a 59y old  Male who presents with a chief complaint of Alcohol withdrawal, self-harm ideation (15 Aug 2020 08:02)      SUBJECTIVE / OVERNIGHT EVENTS:     REVIEW OF SYSTEMS:    CONSTITUTIONAL: No weakness, fevers or chills  EYES/ENT: No visual changes;  No vertigo or throat pain   NECK: No pain or stiffness  RESPIRATORY: No cough, wheezing, hemoptysis; No shortness of breath  CARDIOVASCULAR: No chest pain or palpitations  GASTROINTESTINAL: No abdominal or epigastric pain. No nausea, vomiting, or hematemesis; No diarrhea or constipation. No melena or hematochezia.  GENITOURINARY: No dysuria, frequency or hematuria  NEUROLOGICAL: No numbness or weakness  SKIN: No itching, rashes    MEDICATIONS  (STANDING):  aspirin enteric coated 81 milliGRAM(s) Oral daily  budesonide 160 MICROgram(s)/formoterol 4.5 MICROgram(s) Inhaler 2 Puff(s) Inhalation two times a day  chlordiazePOXIDE   Oral   chlordiazePOXIDE 50 milliGRAM(s) Oral every 8 hours  folic acid 1 milliGRAM(s) Oral daily  lisinopril 5 milliGRAM(s) Oral daily  montelukast 10 milliGRAM(s) Oral daily  pantoprazole    Tablet 40 milliGRAM(s) Oral before breakfast  thiamine IVPB 500 milliGRAM(s) IV Intermittent every 8 hours    MEDICATIONS  (PRN):  ALBUTerol    90 MICROgram(s) HFA Inhaler 2 Puff(s) Inhalation every 6 hours PRN Shortness of Breath and/or Wheezing  simethicone 80 milliGRAM(s) Chew every 6 hours PRN Heartburn      CAPILLARY BLOOD GLUCOSE        I&O's Summary      PHYSICAL EXAM:  Vital Signs Last 24 Hrs  T(C): 36.7 (16 Aug 2020 05:21), Max: 36.9 (15 Aug 2020 10:28)  T(F): 98 (16 Aug 2020 05:21), Max: 98.5 (15 Aug 2020 10:28)  HR: 63 (16 Aug 2020 05:21) (60 - 73)  BP: 138/81 (16 Aug 2020 05:21) (117/71 - 149/81)  BP(mean): --  RR: 18 (16 Aug 2020 05:21) (16 - 18)  SpO2: 97% (16 Aug 2020 05:21) (96% - 100%)    CONSTITUTIONAL: NAD, well-developed  RESPIRATORY: Normal respiratory effort; lungs are clear to auscultation bilaterally  CARDIOVASCULAR: Regular rate and rhythm, normal S1 and S2, no murmur/rub/gallop; No lower extremity edema; Peripheral pulses are 2+ bilaterally  ABDOMEN: Nontender to palpation, normoactive bowel sounds, no rebound/guarding; No hepatosplenomegaly  MUSCLOSKELETAL: no clubbing or cyanosis of digits; no joint swelling or tenderness to palpation  PSYCH: A+O to person, place, and time; affect appropriate  NEURO: Non-focal, no tremors  SKIN: No rashes    LABS:                        13.1   6.73  )-----------( 204      ( 15 Aug 2020 06:10 )             39.1     08-15    140  |  108<H>  |  9   ----------------------------<  128<H>  4.1   |  17<L>  |  0.88    Ca    8.1<L>      15 Aug 2020 18:40  Phos  2.5     08-15  Mg     2.1     08-15    TPro  6.1  /  Alb  3.7  /  TBili  0.5  /  DBili  x   /  AST  63<H>  /  ALT  37  /  AlkPhos  65  08-15              Culture - Urine (collected 14 Aug 2020 02:40)  Source: .Urine Clean Catch (Midstream)  Final Report (15 Aug 2020 07:09):    <10,000 CFU/mL Normal Urogenital Tracy        RADIOLOGY & ADDITIONAL TESTS:  No new imaging or tests    COORDINATION OF CARE:  Care Discussed with Consultants/Other Providers [Y/N]:  Prior or Outpatient Records Reviewed [Y/N]: PROGRESS NOTE:   Authoted by Dr. Kezia Mrash MD  Pager [phone] [facility], [id number] [facility]     Patient is a 59y old  Male who presents with a chief complaint of Alcohol withdrawal, self-harm ideation (15 Aug 2020 08:02)      SUBJECTIVE / OVERNIGHT EVENTS: No acute events overnight. Diet advanced to regular overnight. CIWA 1. Patient reports doing well on Librium taper, minimal tremor, no anxiety, hallucinations. Would like to have some multivitamin. BM and urination ok.    REVIEW OF SYSTEMS:    CONSTITUTIONAL: No fevers or chills  EYES/ENT: No visual changes  NECK: No pain  RESPIRATORY: No cough, or shortness of breath  CARDIOVASCULAR: No chest pain or palpitations  GASTROINTESTINAL: No abdominal pain. No nausea, vomiting, diarrhea or constipation.  GENITOURINARY: No dysuria  NEUROLOGICAL: No numbness or weakness  SKIN: No itching, rashes    MEDICATIONS  (STANDING):  aspirin enteric coated 81 milliGRAM(s) Oral daily  budesonide 160 MICROgram(s)/formoterol 4.5 MICROgram(s) Inhaler 2 Puff(s) Inhalation two times a day  chlordiazePOXIDE   Oral   chlordiazePOXIDE 50 milliGRAM(s) Oral every 8 hours  folic acid 1 milliGRAM(s) Oral daily  lisinopril 5 milliGRAM(s) Oral daily  montelukast 10 milliGRAM(s) Oral daily  pantoprazole    Tablet 40 milliGRAM(s) Oral before breakfast  thiamine IVPB 500 milliGRAM(s) IV Intermittent every 8 hours    MEDICATIONS  (PRN):  ALBUTerol    90 MICROgram(s) HFA Inhaler 2 Puff(s) Inhalation every 6 hours PRN Shortness of Breath and/or Wheezing  simethicone 80 milliGRAM(s) Chew every 6 hours PRN Heartburn      CAPILLARY BLOOD GLUCOSE        I&O's Summary      PHYSICAL EXAM:  Vital Signs Last 24 Hrs  T(C): 36.7 (16 Aug 2020 05:21), Max: 36.9 (15 Aug 2020 10:28)  T(F): 98 (16 Aug 2020 05:21), Max: 98.5 (15 Aug 2020 10:28)  HR: 63 (16 Aug 2020 05:21) (60 - 73)  BP: 138/81 (16 Aug 2020 05:21) (117/71 - 149/81)  BP(mean): --  RR: 18 (16 Aug 2020 05:21) (16 - 18)  SpO2: 97% (16 Aug 2020 05:21) (96% - 100%)    CONSTITUTIONAL: NAD  RESPIRATORY: Normal respiratory effort; lungs are clear to auscultation bilaterally except minimal wheeze left lower lobe  CARDIOVASCULAR: Regular rate and rhythm, normal S1 and S2, no murmur/rub/gallop; No lower extremity edema  ABDOMEN: Nontender to palpation, normoactive bowel sounds, no rebound/guarding  MUSCULOSKELETAL no clubbing or cyanosis of digits; no joint swelling or tenderness to palpation  PSYCH: A+O to person, place, and time; affect appropriate  NEURO: Non-focal, mild tremors  SKIN: No rashes    LABS:                        13.1   6.73  )-----------( 204      ( 15 Aug 2020 06:10 )             39.1     08-15    140  |  108<H>  |  9   ----------------------------<  128<H>  4.1   |  17<L>  |  0.88    Ca    8.1<L>      15 Aug 2020 18:40  Phos  2.5     08-15  Mg     2.1     08-15    TPro  6.1  /  Alb  3.7  /  TBili  0.5  /  DBili  x   /  AST  63<H>  /  ALT  37  /  AlkPhos  65  08-15      Culture - Urine (collected 14 Aug 2020 02:40)  Source: .Urine Clean Catch (Midstream)  Final Report (15 Aug 2020 07:09):    <10,000 CFU/mL Normal Urogenital Tracy        RADIOLOGY & ADDITIONAL TESTS:  No new imaging or tests    COORDINATION OF CARE:  Care Discussed with Consultants/Other Providers [Y/N]: N  Prior or Outpatient Records Reviewed [Y/N]: N

## 2020-08-16 NOTE — PROGRESS NOTE ADULT - PROBLEM SELECTOR PLAN 3
History of suicidal ideation according to triage note and ED provider note. patient currently denying due to calming effects of librium.   -appreciate psych recs: not currently at risk for self harm, d/c 1:1  -f/u behavioral health recs

## 2020-08-17 ENCOUNTER — TRANSCRIPTION ENCOUNTER (OUTPATIENT)
Age: 60
End: 2020-08-17

## 2020-08-17 VITALS
OXYGEN SATURATION: 100 % | TEMPERATURE: 98 F | SYSTOLIC BLOOD PRESSURE: 145 MMHG | DIASTOLIC BLOOD PRESSURE: 82 MMHG | HEART RATE: 66 BPM | RESPIRATION RATE: 19 BRPM

## 2020-08-17 LAB
ANION GAP SERPL CALC-SCNC: 17 MMO/L — HIGH (ref 7–14)
BUN SERPL-MCNC: 11 MG/DL — SIGNIFICANT CHANGE UP (ref 7–23)
CALCIUM SERPL-MCNC: 8.9 MG/DL — SIGNIFICANT CHANGE UP (ref 8.4–10.5)
CHLORIDE SERPL-SCNC: 105 MMOL/L — SIGNIFICANT CHANGE UP (ref 98–107)
CO2 SERPL-SCNC: 18 MMOL/L — LOW (ref 22–31)
CREAT SERPL-MCNC: 0.66 MG/DL — SIGNIFICANT CHANGE UP (ref 0.5–1.3)
GLUCOSE SERPL-MCNC: 101 MG/DL — HIGH (ref 70–99)
HCT VFR BLD CALC: 40.8 % — SIGNIFICANT CHANGE UP (ref 39–50)
HGB BLD-MCNC: 13.5 G/DL — SIGNIFICANT CHANGE UP (ref 13–17)
MAGNESIUM SERPL-MCNC: 2 MG/DL — SIGNIFICANT CHANGE UP (ref 1.6–2.6)
MCHC RBC-ENTMCNC: 29.1 PG — SIGNIFICANT CHANGE UP (ref 27–34)
MCHC RBC-ENTMCNC: 33.1 % — SIGNIFICANT CHANGE UP (ref 32–36)
MCV RBC AUTO: 87.9 FL — SIGNIFICANT CHANGE UP (ref 80–100)
NRBC # FLD: 0 K/UL — SIGNIFICANT CHANGE UP (ref 0–0)
PHOSPHATE SERPL-MCNC: 3.2 MG/DL — SIGNIFICANT CHANGE UP (ref 2.5–4.5)
PLATELET # BLD AUTO: 146 K/UL — LOW (ref 150–400)
PMV BLD: 9.7 FL — SIGNIFICANT CHANGE UP (ref 7–13)
POTASSIUM SERPL-MCNC: 3.9 MMOL/L — SIGNIFICANT CHANGE UP (ref 3.5–5.3)
POTASSIUM SERPL-SCNC: 3.9 MMOL/L — SIGNIFICANT CHANGE UP (ref 3.5–5.3)
RBC # BLD: 4.64 M/UL — SIGNIFICANT CHANGE UP (ref 4.2–5.8)
RBC # FLD: 12 % — SIGNIFICANT CHANGE UP (ref 10.3–14.5)
SODIUM SERPL-SCNC: 140 MMOL/L — SIGNIFICANT CHANGE UP (ref 135–145)
WBC # BLD: 5.87 K/UL — SIGNIFICANT CHANGE UP (ref 3.8–10.5)
WBC # FLD AUTO: 5.87 K/UL — SIGNIFICANT CHANGE UP (ref 3.8–10.5)

## 2020-08-17 PROCEDURE — 99233 SBSQ HOSP IP/OBS HIGH 50: CPT

## 2020-08-17 PROCEDURE — 99239 HOSP IP/OBS DSCHRG MGMT >30: CPT | Mod: GC

## 2020-08-17 RX ORDER — THIAMINE MONONITRATE (VIT B1) 100 MG
1 TABLET ORAL
Qty: 0 | Refills: 0 | DISCHARGE
Start: 2020-08-17

## 2020-08-17 RX ORDER — FOLIC ACID 0.8 MG
1 TABLET ORAL
Qty: 0 | Refills: 0 | DISCHARGE
Start: 2020-08-17

## 2020-08-17 RX ORDER — SIMETHICONE 80 MG/1
1 TABLET, CHEWABLE ORAL
Qty: 120 | Refills: 0
Start: 2020-08-17 | End: 2020-09-15

## 2020-08-17 RX ORDER — THIAMINE MONONITRATE (VIT B1) 100 MG
1 TABLET ORAL
Qty: 30 | Refills: 3
Start: 2020-08-17 | End: 2020-12-14

## 2020-08-17 RX ADMIN — Medication 105 MILLIGRAM(S): at 14:17

## 2020-08-17 RX ADMIN — Medication 1 TABLET(S): at 11:52

## 2020-08-17 RX ADMIN — BUDESONIDE AND FORMOTEROL FUMARATE DIHYDRATE 2 PUFF(S): 160; 4.5 AEROSOL RESPIRATORY (INHALATION) at 09:14

## 2020-08-17 RX ADMIN — Medication 105 MILLIGRAM(S): at 05:28

## 2020-08-17 RX ADMIN — Medication 100 MILLIGRAM(S): at 11:53

## 2020-08-17 RX ADMIN — Medication 50 MILLIGRAM(S): at 02:16

## 2020-08-17 RX ADMIN — PANTOPRAZOLE SODIUM 40 MILLIGRAM(S): 20 TABLET, DELAYED RELEASE ORAL at 06:24

## 2020-08-17 RX ADMIN — Medication 1 PACKET(S): at 09:13

## 2020-08-17 RX ADMIN — MONTELUKAST 10 MILLIGRAM(S): 4 TABLET, CHEWABLE ORAL at 11:52

## 2020-08-17 RX ADMIN — Medication 1 MILLIGRAM(S): at 11:52

## 2020-08-17 RX ADMIN — Medication 81 MILLIGRAM(S): at 11:52

## 2020-08-17 RX ADMIN — LISINOPRIL 5 MILLIGRAM(S): 2.5 TABLET ORAL at 05:28

## 2020-08-17 RX ADMIN — Medication 50 MILLIGRAM(S): at 14:17

## 2020-08-17 NOTE — PROGRESS NOTE BEHAVIORAL HEALTH - NSBHCONSULTFOLLOWAFTERCARE_PSY_A_CORE FT
Please provide with Crisis line as well as contact number for general adult outpatient program at [facility], [place]: [phone]

## 2020-08-17 NOTE — DISCHARGE NOTE NURSING/CASE MANAGEMENT/SOCIAL WORK - PATIENT PORTAL LINK FT
You can access the FollowMyHealth Patient Portal offered by [facility] by registering at the following website: [email or web address]. By joining [facility]’s FollowMyHealth portal, you will also be able to view your health information using other applications (apps) compatible with our system.

## 2020-08-17 NOTE — PROGRESS NOTE ADULT - REASON FOR ADMISSION
Alcohol withdrawal, self-harm ideation

## 2020-08-17 NOTE — DISCHARGE NOTE PROVIDER - NSDCCPCAREPLAN_GEN_ALL_CORE_FT
PRINCIPAL DISCHARGE DIAGNOSIS  Diagnosis: Alcohol withdrawal syndrome without complication  Assessment and Plan of Treatment: Alcohol withdrawal syndrome without complication PRINCIPAL DISCHARGE DIAGNOSIS  Diagnosis: Alcohol withdrawal syndrome without complication  Assessment and Plan of Treatment: You were admitted and treated for alcohol withdrawal using a tapered dose of librium. Alcohol withdrawal is a serious condition that can result in seizures, permanent neurologic damage, heart attacks, and even death. Please avoid further use of alcohol.      SECONDARY DISCHARGE DIAGNOSES  Diagnosis: Major depression  Assessment and Plan of Treatment: You met the DMS V (diagnostic and statistical manual version 5) criteria for major depressive disorder. This is a serious medical condition and should be treated no differently than a physical medical condition like diabetes or heart disease. The treatment for depression includes therapy with a licensed therapist or psychologist and antidepressant medications managed by a psychiatrist. The medications that treat depression often take two or more weeks to work and most of the side effects that accompany these medications disappear within a month of starting the medications. Please seek out and find care for your depression. If you begin to hear voices of people not present (auditory hallucinations) or if you have thoughts of suicide please call emergency services or present to a hospital emergency room for evaluation.

## 2020-08-17 NOTE — DISCHARGE NOTE PROVIDER - NSDCFUADDAPPT_GEN_ALL_CORE_FT
Please follow up with your primary care doctor, Dr Hernandez within two weeks.    Can call [facility] at [phone] if needed.

## 2020-08-17 NOTE — DISCHARGE NOTE NURSING/CASE MANAGEMENT/SOCIAL WORK - NSDCFUADDAPPT_GEN_ALL_CORE_FT
Please follow up with your primary care doctor, Dr Hernandez within two weeks.    Can call [facility] at [phone] if needed.

## 2020-08-17 NOTE — DISCHARGE NOTE PROVIDER - NSDCMRMEDTOKEN_GEN_ALL_CORE_FT
Advair  mcg-21 mcg/inh inhalation aerosol: 2 puff(s) inhaled 2 times a day (Filled June/2020)    albuterol 90 mcg/inh inhalation aerosol: 2 puff(s) inhaled every 4 hours, As Needed (Filled Sept/2019)  aspirin 81 mg oral delayed release tablet: 1 tab(s) orally once a day  colchicine 0.6 mg oral tablet: 1 tab(s) orally once a day (Last Filled April/2020)  lisinopril 5 mg oral tablet: 1 tab(s) orally once a day (Filled July/2020)  montelukast 10 mg oral tablet: 1 tab(s) orally once a day (Filled June/2020) Advair  mcg-21 mcg/inh inhalation aerosol: 2 puff(s) inhaled 2 times a day (Filled June/2020)    albuterol 90 mcg/inh inhalation aerosol: 2 puff(s) inhaled every 4 hours, As Needed (Filled Sept/2019)  aspirin 81 mg oral delayed release tablet: 1 tab(s) orally once a day  colchicine 0.6 mg oral tablet: 1 tab(s) orally once a day (Last Filled April/2020)  folic acid 1 mg oral tablet: 1 tab(s) orally once a day  lisinopril 5 mg oral tablet: 1 tab(s) orally once a day (Filled July/2020)  montelukast 10 mg oral tablet: 1 tab(s) orally once a day (Filled June/2020)  Multiple Vitamins oral tablet: 1 tab(s) orally once a day  simethicone 80 mg oral tablet, chewable: 1 tab(s) orally every 6 hours, As needed, Heartburn  thiamine 100 mg oral tablet: 1 tab(s) orally once a day  Vitamin B1 100 mg oral tablet: 1 tab(s) orally once a day

## 2020-08-17 NOTE — PROGRESS NOTE ADULT - ASSESSMENT
59 year old man with PMH of AUD c/b withdrawal, major depression, HTN, HLD, asthma, GERD who presents to the hospital with acute alcohol intoxication and suicidal ideation admitted for alcohol withdrawal. On Librium taper + CIWA.

## 2020-08-17 NOTE — PROGRESS NOTE ADULT - PROBLEM SELECTOR PLAN 3
History of suicidal ideation according to triage note and ED provider note. patient currently denying due to calming effects of librium.   -appreciate psych recs: not currently at risk for self harm, d/c 1:1  -f/u behavioral health recs History of suicidal ideation according to triage note and ED provider note. patient currently denying due to calming effects of librium.   -appreciate psych recs: not currently at risk for self harm, d/c 1:1  -can follow up with psych as outpatient or call/go to [facility] if needed

## 2020-08-17 NOTE — PROGRESS NOTE ADULT - PROBLEM SELECTOR PLAN 8
Had flu-like illness in March. RU (RN) tested positive for covid  -covid IgG Ab in AM Had flu-like illness in March. GF (RN) tested positive for covid  -covid IgG Ab + for past infection

## 2020-08-17 NOTE — PROGRESS NOTE ADULT - PROBLEM SELECTOR PLAN 1
Last drink before admission. CIWA in ER 6-7. High risk due to history of prior withdrawals, large episode of binge drinking. No history of seizure or ICU admission.  -CIWA 1 this AM  -c/w Librium taper, tolerating well  -Can give more ativan if acutely agitated  -Thiamine 500q8 IV x3 days then 100mg po  - Added multivitamin Last drink before admission. CIWA in ER 6-7. High risk due to history of prior withdrawals, large episode of binge drinking. No history of seizure or ICU admission.  -CIWA 0 this AM  -Librium taper finishing today  -Can give more ativan if acutely agitated  -s/p IV thiamine. Thiamine 100mg po  - Added multivitamin

## 2020-08-17 NOTE — DISCHARGE NOTE PROVIDER - CARE PROVIDER_API CALL
Satish Hernandez  [address] [place] [address]  Phone: [phone]  Fax: (   )    -  Follow Up Time:

## 2020-08-17 NOTE — SBIRT NOTE ADULT - NSSBIRTALCPASSREFTXDET_GEN_A_CORE
met with the patient and discussed the score of AUDIT. patient expressed remorse over his current lifestyle habits and expressed interest in change.   provided supportive counseling and discussed the negative effects his current lifestyle is having on his health.  patient expressed understanding is interested in exploring treatment options.   provided a passive referral to treatment and an informational packet on alcohol cessation.

## 2020-08-17 NOTE — PROGRESS NOTE BEHAVIORAL HEALTH - SUMMARY
60 y/o Male, with a PmHx of Asthma, ETOH Abuse, HTN, HLD, Gerd, admitted for suicidality and ETOH/benzo withdrawal. He has substance-induced SI that passes with sobering. He also has low-grade/mild depression comorbid with his substance use. He has stabilized here with the use of benzodiazepines (Librium) in taper form.

## 2020-08-17 NOTE — PROGRESS NOTE ADULT - PROBLEM SELECTOR PLAN 2
Complaining of mild abdominal pain, attributes to gas, mostly resolves with simethicone. Lipase 15->11.   - Diet advanced per patient request  -low suspicion for pancreatitis  -ordered simethicone q6 PRN

## 2020-08-17 NOTE — PROGRESS NOTE BEHAVIORAL HEALTH - NSBHCHARTREVIEWVS_PSY_A_CORE FT
Vital Signs Last 24 Hrs  T(C): 36.4 (17 Aug 2020 10:00), Max: 36.9 (16 Aug 2020 21:22)  T(F): 97.6 (17 Aug 2020 10:00), Max: 98.5 (16 Aug 2020 21:22)  HR: 70 (17 Aug 2020 10:00) (61 - 73)  BP: 134/86 (17 Aug 2020 10:00) (115/65 - 144/82)  BP(mean): --  RR: 18 (17 Aug 2020 10:00) (17 - 20)  SpO2: 100% (17 Aug 2020 10:00) (99% - 100%)

## 2020-08-17 NOTE — DISCHARGE NOTE PROVIDER - HOSPITAL COURSE
HPI:    Mr. Arias is a 60 yo man with a PMH of Alcohol Use Disorder, MDD, HTN, GERD, HLD, gout, and asthma who presented to [facility] ED with intoxication on 8/13. Patient's girlfriend was concerned that patient was intoxicated and depressed so she called EMS. She took away his keys and when she wouldn't get him more alcohol he joked that he would drink the hand-. Patient states he drank at least 12 beers and 10 bottles of vodka over the last 1-2 weeks. He states he also took an unknown but high number of unknown benzodiazepines which were prescribed to him a number of years ago in [facility]. He endorses feeling depressed for a long time, stating he lost his parents at a young age as well as his wife. He additionally lost his job as an  in 2008 and has reported drinking heavily since then. 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 At this time he endorses feelings of sadness, difficulty sleeping, difficulty with concentration, anhedonia, and occasional suicidal/self-harm thoughts Of note patient reports history of alcohol withdrawals but never required ICU admission or c/b siezures. For his MDD he was prescribed mirtazipine but he never took it for fears of the side effect "may increase thoughts of suicide". Patient had endorsed suicidal ideation while in the ED to several people but was without intent or plan. Upon our questioning he is denying current SI. Patient also endorses having COVID in March. his girlfriend tested positive and he had the same symptoms but was never tested. Patientt denies headaches, abdominal pain, nausea, vomiting, chest pain, numbness/tingling, SOB, fever, chills, auditory/visual hallucinations, and seizures. (14 Aug 2020 14:31)        Patient placed on librium taper with low CIWAs. Behavioral health consult recommended not admitting to [facility] at this time, f/u as outpatient HPI:    Mr. Arias is a 60 yo man with a PMH of Alcohol Use Disorder, MDD, HTN, GERD, HLD, gout, and asthma who presented to [facility] ED with intoxication on 8/13. Patient's girlfriend was concerned that patient was intoxicated and depressed so she called EMS. She took away his keys and when she wouldn't get him more alcohol he joked that he would drink the hand-. Patient states he drank at least 12 beers and 10 bottles of vodka over the last 1-2 weeks. He states he also took an unknown but high number of unknown benzodiazepines which were prescribed to him a number of years ago in [facility]. He endorses feeling depressed for a long time, stating he lost his parents at a young age as well as his wife. He additionally lost his job as an  in 2008 and has reported drinking heavily since then. 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 At this time he endorses feelings of sadness, difficulty sleeping, difficulty with concentration, anhedonia, and occasional suicidal/self-harm thoughts Of note patient reports history of alcohol withdrawals but never required ICU admission or c/b siezures. For his MDD he was prescribed mirtazipine but he never took it for fears of the side effect "may increase thoughts of suicide". Patient had endorsed suicidal ideation while in the ED to several people but was without intent or plan. Upon our questioning he is denying current SI. Patient also endorses having COVID in March. his girlfriend tested positive and he had the same symptoms but was never tested. Patientt denies headaches, abdominal pain, nausea, vomiting, chest pain, numbness/tingling, SOB, fever, chills, auditory/visual hallucinations, and seizures. (14 Aug 2020 14:31)        Patient placed on librium taper with low CIWAs. Behavioral health consult recommended not admitting to [facility] at this time, f/u as outpatient. Patient completed Librium taper with low CIWA scores. Endorsed feeling better. Patient was medically optimized, stable and ready for discharge. Plan of care and return precautions were discussed with the patient who verbally stated understanding. Mr. Arias is a 60 yo man with a PMH of Alcohol Use Disorder, MDD, HTN, GERD, HLD, gout, and asthma who presented to [facility] ED with intoxication on 8/13. Patient's girlfriend was concerned that patient was intoxicated and depressed so she called EMS. She took away his keys and when she wouldn't get him more alcohol he joked that he would drink the hand-. Patient states he drank at least 12 beers and 10 bottles of vodka over the last 1-2 weeks. He states he also took an unknown but high number of unknown benzodiazepines which were prescribed to him a number of years ago in [facility]. He endorses feeling depressed for a long time, stating he lost his parents at a young age as well as his wife. He additionally lost his job as an  in 2008 and has reported drinking heavily since then. 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 At this time he endorses feelings of sadness, difficulty sleeping, difficulty with concentration, anhedonia, and occasional suicidal/self-harm thoughts Of note patient reports history of alcohol withdrawals but never required ICU admission or c/b siezures. For his MDD he was prescribed mirtazipine but he never took it for fears of the side effect "may increase thoughts of suicide". Patient had endorsed suicidal ideation while in the ED to several people but was without intent or plan. Upon our questioning he is denying current SI. Patient also endorses having COVID in March. his girlfriend tested positive and he had the same symptoms but was never tested. Patientt denies headaches, abdominal pain, nausea, vomiting, chest pain, numbness/tingling, SOB, fever, chills, auditory/visual hallucinations, and seizures. (14 Aug 2020 14:31)        Patient placed on librium taper with low CIWAs. Behavioral health consulted on patient and recommended treating for ETOH withdrawal and low concern for suicide risk with no contraindication for discharge. Patient completed Librium taper with low CIWA scores. Endorsed feeling better. Patient was medically optimized, stable and ready for discharge. Plan of care and return precautions were discussed with the patient who verbally stated understanding.

## 2020-08-17 NOTE — DISCHARGE NOTE PROVIDER - NSFOLLOWUPCLINICS_GEN_ALL_ED_FT
[facility] Psychiat[facility]  Psychiatry  [address] [place] [address]  Phone: [phone]  Fax:   Follow Up Time:

## 2020-08-17 NOTE — PROGRESS NOTE BEHAVIORAL HEALTH - NSBHCONSULTPRIMARYDISCUSSYES_PSY_A_CORE FT
Agree with medical/psych clearance imminently. Patient demonstrates ambivalence towards substance use and/or mental health programs/treatments.

## 2020-08-17 NOTE — DISCHARGE NOTE PROVIDER - PROVIDER TOKENS
FREE:[LAST:[Mary],FIRST:[Satish],PHONE:[phone]],FAX:[(   )    -],ADDRESS:[[address] [place] [address]]]

## 2020-08-17 NOTE — PROGRESS NOTE BEHAVIORAL HEALTH - NSBHFUPINTERVALHXFT_PSY_A_CORE
Chart reviewed, pt seen. Over weekend did well, without acute behavioral incidents. Today is AA O x 3, in NAD, calm/cooperative, without overt withdrawal features or signs. Looking forward to leaving hospital, is forward-thinking about goals/work options/returning to girlfriend. He denies any safety concerns at home or presently, and denies SI. Re: substance use/mental health, he says he needs to "just figure it out" and is ambivalent regarding referrals. He will continue Remeron which he was taking before, and vows to refrain from ETOH.

## 2020-08-17 NOTE — PROGRESS NOTE ADULT - ATTENDING COMMENTS
Patient seen and examined, chart and labs reviewed. Case discussed with house staff.     59 year old man with PMH of AUD c/b withdrawal, major depression, HTN, HLD, asthma, GERD who presents to the hospital with acute alcohol intoxication and suicidal ideation admitted for alcohol withdrawal.  C/w Librium samuel  f/u Psych Patient seen and examined, chart and labs reviewed. Case discussed with house staff.     59 year old man with PMH of AUD c/b withdrawal, major depression, HTN, HLD, asthma, GERD who presents to the hospital with acute alcohol intoxication and suicidal ideation admitted for alcohol withdrawal.  CIWA has been 0-1, patient is significantly improved. His last dose of Librium to complete the taper is 2pm today. He has no contraindications for discharge from psych standpoint. He denies SI/HI and depressed mood. He was evaluated by SW and is looking forward to getting out of the hospital and getting his "life together". He follows closely with PMD.     33 minutes spent on discharge planning.

## 2020-08-18 ENCOUNTER — TRANSCRIPTION ENCOUNTER (OUTPATIENT)
Age: 60
End: 2020-08-18

## 2020-08-20 NOTE — DISCHARGE NOTE PROVIDER - NSDCCAREPROVSEEN_GEN_ALL_CORE_FT
August 20, 2020      Lali Griffiths NP  [address]  [place] [address]           [facility]  [address]  [place] [address]  Phone: [phone]  Fax: [phone]          Patient: Gina Garcia   MR Number: [id number]   YOB: 1983   Date of Visit: 8/20/2020       Dear Lali Griffiths:    Thank you for referring Gina Garcia to me for evaluation. Attached you will find relevant portions of my assessment and plan of care.    If you have questions, please do not hesitate to call me. I look forward to following Gina Garcia along with you.    Sincerely,    Berkley Duckworth MD    Enclosure  CC:  No Recipients    If you would like to receive this communication electronically, please contact [email or web address] or [phone] to request more information on [facility] [employer] access.    For providers and/or their staff who would like to refer a patient to [facility], please contact us through our one-stop-shop provider referral line, [facility], [place], at [phone].    If you feel you have received this communication in error or would no longer like to receive these types of communications, please e-mail [email or web address]          Merlin, Jessie Palmer, Chuy Azar

## 2020-09-30 NOTE — ED ADULT TRIAGE NOTE - CHIEF COMPLAINT QUOTE
c/o " withdrawing from alcohol" no tremors noted, denies HA, pt reports had thoughts of hurting himself earlier while arguing with girlfriend. denies having SI, HI or AVH at this time.
5

## 2021-06-08 NOTE — ED PROVIDER NOTE - CCCP TRG CHIEF CMPLNT
Call to patient discussed ER evaluation. Patient states her  will take her to [facility]'s ER shortly.   Patient verbalizes understanding of instructions. Advised to call office if any questions or concerns.   
Patient was advised to go to ER after her visit with Kathleen (occupational therapist w/ Lymphedema clinic). Patient has open wounds and yeast like infections in the groin and pubic area. It was very painful and patient was advised to go to [facility] ER for further evaluation.  Please call Kathleen to discuss.    Caller is aware that the PCP is out 6/8/21.   
Spoke with Kathleen (Occupational Therapist) reports at appointment yesterday patient in severe pain genitals.  Kathleen assessed area noted packing in pubic, genital, abdominal folds with drainage, foul smell.   reported patient has  large open area.   Kathleen reports patient very weak, severe pain, Severe lymphedema genital area.  Patient was advised ER evaluation at [facility] yesterday    Declined stated she would go to ER 6-8-2021.  Kathleen reports PCP office was after hours when attempted to contact office on 6-7-2021.  Kathleen reports prior to continuing lymphedema treatment patient will need increased mobility/strength, wounds improved, pain controlled.  When patient able to resume lymphedema therapy needs order for Legs , truck, abdomen, genital lymphedema.  Informed Kathleen writer will call patient to discuss ER evaluation.   Routing to PCP to inform.    
Alcohol withdrawal

## 2022-05-11 NOTE — ED ADULT NURSE NOTE - NSFALLRSKINDICTYPE_ED_ALL_ED
Care Management Initial Consult    General Information  Assessment completed with: Patient,    Type of CM/SW Visit: CM Role Introduction    Primary Care Provider verified and updated as needed: Yes   Readmission within the last 30 days:           Advance Care Planning:            Communication Assessment  Patient's communication style: spoken language (English or Bilingual)    Hearing Difficulty or Deaf: no   Wear Glasses or Blind: no    Cognitive  Cognitive/Neuro/Behavioral: WDL                      Living Environment:   People in home: parent(s)     Current living Arrangements: house      Able to return to prior arrangements: yes       Family/Social Support:  Care provided by: self  Provides care for: no one  Marital Status: Single  Parent(s)          Description of Support System: Supportive, Involved         Current Resources:   Patient receiving home care services: No     Community Resources: None  Equipment currently used at home: none  Supplies currently used at home: None    Employment/Financial:  Employment Status: employed full-time        Financial Concerns: No concerns identified           Lifestyle & Psychosocial Needs:  Social Determinants of Health     Tobacco Use: Low Risk      Smoking Tobacco Use: Never Smoker     Smokeless Tobacco Use: Never Used   Alcohol Use: Not on file   Financial Resource Strain: Not on file   Food Insecurity: Not on file   Transportation Needs: Not on file   Physical Activity: Not on file   Stress: Not on file   Social Connections: Not on file   Intimate Partner Violence: Not on file   Depression: Not at risk     PHQ-2 Score: 2   Housing Stability: Not on file       Functional Status:  Prior to admission patient needed assistance:   Dependent ADLs:: Independent  Dependent IADLs:: Independent       Mental Health Status:  Mental Health Status: No Current Concerns       Chemical Dependency Status:  Chemical Dependency Status: No Current Concerns              Values/Beliefs:  Spiritual, Cultural Beliefs, Anabaptist Practices, Values that affect care: no               Additional Information:  SWCM met and introduced self and CM services to Pt. Pt lives with parents. Pt independent at baseline. Pt asked that SWCM call their Mom as wants community resources and Pt not sure what resources.  VM left for Mom.     JACKSON Soni         Intoxication

## 2022-07-18 NOTE — ED ADULT NURSE NOTE - NSFALLRSKASSISTTYPE_ED_ALL_ED
Walking Advancement Flap (Single) Text: The defect edges were debeveled with a #15 scalpel blade.  Given the location of the defect and the proximity to free margins a single advancement flap was deemed most appropriate.  Using a sterile surgical marker, an appropriate advancement flap was drawn incorporating the defect and placing the expected incisions within the relaxed skin tension lines where possible.    The area thus outlined was incised deep to adipose tissue with a #15 scalpel blade.  The skin margins were undermined to an appropriate distance in all directions utilizing iris scissors.

## 2022-09-09 ENCOUNTER — EMERGENCY (EMERGENCY)
Facility: HOSPITAL | Age: 62
LOS: 1 days | Discharge: HOME | End: 2022-09-09
Attending: EMERGENCY MEDICINE | Admitting: EMERGENCY MEDICINE

## 2022-09-09 VITALS
HEIGHT: 71 IN | OXYGEN SATURATION: 98 % | SYSTOLIC BLOOD PRESSURE: 150 MMHG | DIASTOLIC BLOOD PRESSURE: 104 MMHG | TEMPERATURE: 97 F | RESPIRATION RATE: 18 BRPM | HEART RATE: 102 BPM

## 2022-09-09 PROCEDURE — 99285 EMERGENCY DEPT VISIT HI MDM: CPT | Mod: 25

## 2022-09-09 NOTE — ED PROVIDER NOTE - ATTENDING CONTRIBUTION TO CARE
Attending Statement: I have personally seen and examined this patient. I have fully participated in the care of this patient. I have reviewed all pertinent clinical information, including history physical exam, plan and the Resident's note and agree except as noted  61yoM hx asthma, HTN, MDD, HLD, ETOH abuse BIBEMS from home for intoxication. pt endorse he was drinking at home. His GF called EMS> pt states "IM ok"  endorse " a little headache" no head trauma no cp no sob no n/v/d no abdominal pain. Vital signs noted. well appearing calm. no sign of head/facial trauma no ecchymosis of chest/back or abdomen. no work of breathing. soft nt abdomen. moving all ext plan obtain collateral, labs, cxr, ct head/neck, re assess

## 2022-09-09 NOTE — ED PROVIDER NOTE - PROGRESS NOTE DETAILS
Attending MD Foster.  Pt signed out to me in stable condition pending MTF, labs, reassess, 62 yo alcoholic with pmhx of asthma who presented after inc EtOH, transient resolved SOB at home witnessed by GF. O'Edmunds DO PGY-3: pt is ambulatory. Pt is cleared for discharge and medically cleared. I called rossi (partner) who will  the pt. Attending MD Foster.  Pt ambulatory with non-actionable labs and no further SOB and no CP.  Stable for discharge home.

## 2022-09-09 NOTE — ED ADULT NURSE NOTE - OBJECTIVE STATEMENT
pt received to spot 6a, p/w alcohol intoxication from home. denies medical complaints, appears comfortable and in no distress. speaking clearly. belongings in cabinet 21. FS in progress. comfort and safety measures provided.

## 2022-09-09 NOTE — ED PROVIDER NOTE - CLINICAL SUMMARY MEDICAL DECISION MAKING FREE TEXT BOX
O'Saurav DO PGY-3: pt w/ hx of etoh abuse bibems for possible intox. Will obtain finger stick. No gross trauma noted. Will obtain head CT, CT neck. CXR.

## 2022-09-09 NOTE — ED PROVIDER NOTE - NSFOLLOWUPCLINICS_GEN_ALL_ED_FT
[facility]  Behavioral Health  [address] [place] [address]  Phone: [phone]  Fax:

## 2022-09-09 NOTE — ED PROVIDER NOTE - OBJECTIVE STATEMENT
62 y/o M w/ pmhx of asthma, HTN, MDD, HLD and alcohol use disorder presents bibems 2/2 consuming alcohol. In ED pt is slurring words and states he "had a few shots" to "get rid of my hangover". Denies SI or HI.     Roxanna (Domestic Partner) [phone] -> states pt has been "drinking for a few days" and pt was very weak and sitting in the shower and seemed very sleepy. 62 y/o M w/ pmhx of asthma, HTN, MDD, HLD and alcohol use disorder presents bibems 2/2 consuming alcohol. In ED pt is slurring words and states he "had a few shots" to "get rid of my hangover". Denies SI or HI.   Roxanna (Domestic Partner) [phone] -> states pt has been "drinking for a few days" and pt was very weak and sitting in the shower and seemed very sleepy. Roxnana denies seeing any head trauma or LOC

## 2022-09-09 NOTE — ED ADULT TRIAGE NOTE - CHIEF COMPLAINT QUOTE
states" I am in pain" EMS states " patient is been [id number] alcohol since 2 days" wife called 911. patient c/o general pin all over the body. as per EMS wife called 911 as patient started to drink back heavy alcohol.

## 2022-09-09 NOTE — ED PROVIDER NOTE - PATIENT PORTAL LINK FT
You can access the FollowMyHealth Patient Portal offered by [facility] by registering at the following website: [email or web address]. By joining [facility]’s FollowMyHealth portal, you will also be able to view your health information using other applications (apps) compatible with our system.

## 2022-09-09 NOTE — ED ADULT NURSE NOTE - CHIEF COMPLAINT QUOTE
states" I am in pain" EMS states " patient is been [id number] alcohol since 2 days" wife called 911. patient c/o general pin all over the body. as per EMS wife called 911 as patient started to drink back heavy alcohol.

## 2022-09-09 NOTE — ED PROVIDER NOTE - NS ED ROS FT
CONSTITUTIONAL - No fever, No diaphoresis, No weight change  SKIN - No rash  HEMATOLOGIC - No abnormal bleeding or bruising  EYES - No eye pain, No blurred vision  ENT - No change in hearing, No sore throat, No neck pain, No rhinorrhea, No ear pain  RESPIRATORY - No shortness of breath, No cough  CARDIAC -No chest pain, No palpitations  GI - No abdominal pain, No nausea, No vomiting, No diarrhea, No constipation  - No dysuria, no frequency, no hematuria.   MUSCULOSKELETAL - No joint pain, No swelling, No back pain  NEUROLOGIC - No numbness, +gen weakness, No headache, No dizziness

## 2022-09-09 NOTE — ED PROVIDER NOTE - PHYSICAL EXAMINATION
CONSTITUTIONAL: Well-developed; well-nourished; in no acute distress.   SKIN: warm, dry  HEAD: Normocephalic; atraumatic.  EYES: no conjunctival injection. PERRL.   ENT: No nasal discharge; airway clear.  NECK: Supple; non tender.  CARD: S1, S2 normal; no murmurs, gallops, or rubs. Regular rate and rhythm.   RESP: No wheezes, rales or rhonchi. Good air movement bilaterally.   ABD: soft ntnd, no guarding, no distention, no rigidity.   EXT: No cyanosis or edema.   NEURO: AOx3 but slurring words   PSYCH: Cooperative, appropriate.

## 2022-09-10 VITALS
RESPIRATION RATE: 16 BRPM | SYSTOLIC BLOOD PRESSURE: 153 MMHG | DIASTOLIC BLOOD PRESSURE: 96 MMHG | OXYGEN SATURATION: 98 % | HEART RATE: 99 BPM

## 2022-09-10 LAB
ALBUMIN SERPL ELPH-MCNC: 4.8 G/DL — SIGNIFICANT CHANGE UP (ref 3.3–5)
ALP SERPL-CCNC: 68 U/L — SIGNIFICANT CHANGE UP (ref 40–120)
ALT FLD-CCNC: 67 U/L — HIGH (ref 4–41)
ANION GAP SERPL CALC-SCNC: 16 MMOL/L — HIGH (ref 7–14)
AST SERPL-CCNC: 79 U/L — HIGH (ref 4–40)
BASOPHILS # BLD AUTO: 0.1 K/UL — SIGNIFICANT CHANGE UP (ref 0–0.2)
BASOPHILS NFR BLD AUTO: 1.5 % — SIGNIFICANT CHANGE UP (ref 0–2)
BILIRUB SERPL-MCNC: 0.5 MG/DL — SIGNIFICANT CHANGE UP (ref 0.2–1.2)
BUN SERPL-MCNC: 12 MG/DL — SIGNIFICANT CHANGE UP (ref 7–23)
CALCIUM SERPL-MCNC: 9.4 MG/DL — SIGNIFICANT CHANGE UP (ref 8.4–10.5)
CHLORIDE SERPL-SCNC: 101 MMOL/L — SIGNIFICANT CHANGE UP (ref 98–107)
CO2 SERPL-SCNC: 24 MMOL/L — SIGNIFICANT CHANGE UP (ref 22–31)
CREAT SERPL-MCNC: 0.82 MG/DL — SIGNIFICANT CHANGE UP (ref 0.5–1.3)
EGFR: 101 ML/MIN/1.73M2 — SIGNIFICANT CHANGE UP
EOSINOPHIL # BLD AUTO: 0.68 K/UL — HIGH (ref 0–0.5)
EOSINOPHIL NFR BLD AUTO: 10 % — HIGH (ref 0–6)
GLUCOSE SERPL-MCNC: 115 MG/DL — HIGH (ref 70–99)
HCT VFR BLD CALC: 47.3 % — SIGNIFICANT CHANGE UP (ref 39–50)
HGB BLD-MCNC: 16.6 G/DL — SIGNIFICANT CHANGE UP (ref 13–17)
IANC: 1.82 K/UL — SIGNIFICANT CHANGE UP (ref 1.8–7.4)
IMM GRANULOCYTES NFR BLD AUTO: 0.3 % — SIGNIFICANT CHANGE UP (ref 0–1.5)
LYMPHOCYTES # BLD AUTO: 3.43 K/UL — HIGH (ref 1–3.3)
LYMPHOCYTES # BLD AUTO: 50.7 % — HIGH (ref 13–44)
MCHC RBC-ENTMCNC: 30 PG — SIGNIFICANT CHANGE UP (ref 27–34)
MCHC RBC-ENTMCNC: 35.1 GM/DL — SIGNIFICANT CHANGE UP (ref 32–36)
MCV RBC AUTO: 85.4 FL — SIGNIFICANT CHANGE UP (ref 80–100)
MONOCYTES # BLD AUTO: 0.72 K/UL — SIGNIFICANT CHANGE UP (ref 0–0.9)
MONOCYTES NFR BLD AUTO: 10.6 % — SIGNIFICANT CHANGE UP (ref 2–14)
NEUTROPHILS # BLD AUTO: 1.82 K/UL — SIGNIFICANT CHANGE UP (ref 1.8–7.4)
NEUTROPHILS NFR BLD AUTO: 26.9 % — LOW (ref 43–77)
NRBC # BLD: 0 /100 WBCS — SIGNIFICANT CHANGE UP (ref 0–0)
NRBC # FLD: 0 K/UL — SIGNIFICANT CHANGE UP (ref 0–0)
PLATELET # BLD AUTO: 242 K/UL — SIGNIFICANT CHANGE UP (ref 150–400)
POTASSIUM SERPL-MCNC: 4.2 MMOL/L — SIGNIFICANT CHANGE UP (ref 3.5–5.3)
POTASSIUM SERPL-SCNC: 4.2 MMOL/L — SIGNIFICANT CHANGE UP (ref 3.5–5.3)
PROT SERPL-MCNC: 7.7 G/DL — SIGNIFICANT CHANGE UP (ref 6–8.3)
RBC # BLD: 5.54 M/UL — SIGNIFICANT CHANGE UP (ref 4.2–5.8)
RBC # FLD: 11.8 % — SIGNIFICANT CHANGE UP (ref 10.3–14.5)
SODIUM SERPL-SCNC: 141 MMOL/L — SIGNIFICANT CHANGE UP (ref 135–145)
TROPONIN T, HIGH SENSITIVITY RESULT: 8 NG/L — SIGNIFICANT CHANGE UP
WBC # BLD: 6.77 K/UL — SIGNIFICANT CHANGE UP (ref 3.8–10.5)
WBC # FLD AUTO: 6.77 K/UL — SIGNIFICANT CHANGE UP (ref 3.8–10.5)

## 2022-09-10 PROCEDURE — 70450 CT HEAD/BRAIN W/O DYE: CPT | Mod: 26,MA

## 2022-09-10 PROCEDURE — 72125 CT NECK SPINE W/O DYE: CPT | Mod: 26,MA

## 2022-09-10 PROCEDURE — 71045 X-RAY EXAM CHEST 1 VIEW: CPT | Mod: 26

## 2022-09-10 NOTE — ED ADULT NURSE REASSESSMENT NOTE - NS ED NURSE REASSESS COMMENT FT1
break coverage: pt sleeping but arousable. taken to CT scan. pending reassessment upon return.
pt ambulated to restroom gait steady. tolerating PO.
Pt sleeping on side.  Respirations even and unlabored.  Will continue to monitor.

## 2022-09-26 ENCOUNTER — EMERGENCY (EMERGENCY)
Facility: HOSPITAL | Age: 62
LOS: 1 days | Discharge: HOME | End: 2022-09-26
Attending: EMERGENCY MEDICINE | Admitting: EMERGENCY MEDICINE

## 2022-09-26 VITALS
HEIGHT: 71 IN | SYSTOLIC BLOOD PRESSURE: 148 MMHG | OXYGEN SATURATION: 100 % | RESPIRATION RATE: 16 BRPM | DIASTOLIC BLOOD PRESSURE: 92 MMHG | TEMPERATURE: 99 F | HEART RATE: 65 BPM

## 2022-09-26 VITALS
DIASTOLIC BLOOD PRESSURE: 65 MMHG | TEMPERATURE: 98 F | HEART RATE: 86 BPM | OXYGEN SATURATION: 98 % | RESPIRATION RATE: 16 BRPM | SYSTOLIC BLOOD PRESSURE: 109 MMHG

## 2022-09-26 LAB
ALBUMIN SERPL ELPH-MCNC: 4.7 G/DL — SIGNIFICANT CHANGE UP (ref 3.3–5)
ALP SERPL-CCNC: 70 U/L — SIGNIFICANT CHANGE UP (ref 40–120)
ALT FLD-CCNC: 48 U/L — HIGH (ref 4–41)
ANION GAP SERPL CALC-SCNC: 15 MMOL/L — HIGH (ref 7–14)
AST SERPL-CCNC: 45 U/L — HIGH (ref 4–40)
BILIRUB SERPL-MCNC: 0.2 MG/DL — SIGNIFICANT CHANGE UP (ref 0.2–1.2)
BUN SERPL-MCNC: 11 MG/DL — SIGNIFICANT CHANGE UP (ref 7–23)
CALCIUM SERPL-MCNC: 9.2 MG/DL — SIGNIFICANT CHANGE UP (ref 8.4–10.5)
CHLORIDE SERPL-SCNC: 104 MMOL/L — SIGNIFICANT CHANGE UP (ref 98–107)
CO2 SERPL-SCNC: 25 MMOL/L — SIGNIFICANT CHANGE UP (ref 22–31)
CREAT SERPL-MCNC: 0.89 MG/DL — SIGNIFICANT CHANGE UP (ref 0.5–1.3)
EGFR: 99 ML/MIN/1.73M2 — SIGNIFICANT CHANGE UP
ETHANOL SERPL-MCNC: 360 MG/DL — HIGH
GLUCOSE SERPL-MCNC: 170 MG/DL — HIGH (ref 70–99)
HCT VFR BLD CALC: 45.5 % — SIGNIFICANT CHANGE UP (ref 39–50)
HGB BLD-MCNC: 15.1 G/DL — SIGNIFICANT CHANGE UP (ref 13–17)
MAGNESIUM SERPL-MCNC: 1.8 MG/DL — SIGNIFICANT CHANGE UP (ref 1.6–2.6)
MCHC RBC-ENTMCNC: 29.3 PG — SIGNIFICANT CHANGE UP (ref 27–34)
MCHC RBC-ENTMCNC: 33.2 GM/DL — SIGNIFICANT CHANGE UP (ref 32–36)
MCV RBC AUTO: 88.3 FL — SIGNIFICANT CHANGE UP (ref 80–100)
NRBC # BLD: 0 /100 WBCS — SIGNIFICANT CHANGE UP (ref 0–0)
NRBC # FLD: 0 K/UL — SIGNIFICANT CHANGE UP (ref 0–0)
PHOSPHATE SERPL-MCNC: 3.5 MG/DL — SIGNIFICANT CHANGE UP (ref 2.5–4.5)
PLATELET # BLD AUTO: 291 K/UL — SIGNIFICANT CHANGE UP (ref 150–400)
POTASSIUM SERPL-MCNC: 4.1 MMOL/L — SIGNIFICANT CHANGE UP (ref 3.5–5.3)
POTASSIUM SERPL-SCNC: 4.1 MMOL/L — SIGNIFICANT CHANGE UP (ref 3.5–5.3)
PROT SERPL-MCNC: 7.4 G/DL — SIGNIFICANT CHANGE UP (ref 6–8.3)
RBC # BLD: 5.15 M/UL — SIGNIFICANT CHANGE UP (ref 4.2–5.8)
RBC # FLD: 12.5 % — SIGNIFICANT CHANGE UP (ref 10.3–14.5)
SODIUM SERPL-SCNC: 144 MMOL/L — SIGNIFICANT CHANGE UP (ref 135–145)
WBC # BLD: 8.19 K/UL — SIGNIFICANT CHANGE UP (ref 3.8–10.5)
WBC # FLD AUTO: 8.19 K/UL — SIGNIFICANT CHANGE UP (ref 3.8–10.5)

## 2022-09-26 PROCEDURE — 99284 EMERGENCY DEPT VISIT MOD MDM: CPT

## 2022-09-26 NOTE — ED PROVIDER NOTE - NSFOLLOWUPINSTRUCTIONS_ED_ALL_ED_FT
Please follow up with your primary care doctor within 1 week.    Alcohol Abuse    Alcohol intoxication occurs when the amount of alcohol that a person has consumed impairs his or her ability to mentally and physically function. Chronic alcohol consumption can also lead to a variety of health issues including neurological disease, stomach disease, heart disease, liver disease, etc. Do not drive after drinking alcohol. Drinking enough alcohol to end up in an Emergency Room suggests you may have an alcohol abuse problem. Seek help at a drug addiction center.    SEEK IMMEDIATE MEDICAL CARE IF YOU HAVE ANY OF THE FOLLOWING SYMPTOMS: seizures, vomiting blood, blood in your stool, lightheadedness/dizziness, or becoming shaky to tremulous when you stop drinking.

## 2022-09-26 NOTE — ED ADULT TRIAGE NOTE - CHIEF COMPLAINT QUOTE
Pt. brought to [facility] ED by [facility] for intoxication. Pt. lives at home with his wife. He cannot say how much he drank. PMH:  HTN, asthma, ETOH. NKDA. Pt. placed on stretcher at ambulance bay. NAD noted. Pt. brought to [facility] ED by [facility] for intoxication. Pt. lives at home with his wife. He cannot say how much he drank. PMH:  HTN, asthma, ETOH. NKDA. Pt. placed on stretcher at ambulance bay. . Pt. undressed at triage. Belongings placed in property bag and placed outside of room 21.  NAD noted.

## 2022-09-26 NOTE — PROVIDER CONTACT NOTE (OTHER) - ASSESSMENT
Medical team referred this case as pt has been medically cleared for discharge. pt was seen by sbirt. Pt is 62 y/o male.  Writer spoke with the pt in ED # 23A  and  Pt stated “ I have drinking problem and Pt is alert, ambulatory and oriented X4. Pt denies suicidal or homicidal thoughts during this time of discharge. Writer provided with education on ill effect on alcohol use, information about [facility], Rehab was discussed with the pt and pt declined to go. However Pt took the list  of programs on inpatient and outpatient centers. Writer contacted [facility] – [phone] spoke with Ms. Nina  and trip invoice number # [id number] . Writer contacted [employer] at [phone] and spoke with Mr. Salvador  informed that pt will be picked up at 10: 00 am. Cab will come to Adult ED  ramp to  the pt and pt was made aware.

## 2022-09-26 NOTE — ED ADULT TRIAGE NOTE - NSPATIENTFLAG_GEN_A_ER
Green (Altered Mental Status/Behavior) Purple DH (Discharge Huddle; Vulnerable Patient)/Green (Altered Mental Status/Behavior)

## 2022-09-26 NOTE — ED PROVIDER NOTE - ATTENDING CONTRIBUTION TO CARE
I performed a face-to-face evaluation of the patient and performed a history and physical examination. I agree with the history and physical examination. If this was a PA visit, I personally saw the patient with the PA and performed a substantive portion of the visit including all aspects of the medical decision making.    Heavy alcohol use. Was agitated and aggressive at home. Neighbor called the police. No evidence of trauma. Patient has gone through alcohol withdrawal before. Will await clinical sobriety. Treat alcohol withdrawal if it emerges.

## 2022-09-26 NOTE — ED PROVIDER NOTE - PROGRESS NOTE DETAILS
Kadi Christiansen DO (PGY2): Pt signed out to me by night team. Upon reassessment, pt AAOx3, answering questions appropriately, tolerated PO. Will speak with social work regarding d/c as last night pt was verbally aggressive toward GF Roxanna who would be his ride home. Plan for SW, ambulate, and dc. Kadi Christiansen DO (PGY2): SW evaluated pt, will provide taxi for ride home. Girlfriend Roxanna states she will accept pt home. Pt remains clinically sober, ambulating in ED. Pt made aware of lab results. Questions regarding their symptoms were addressed. Advised to follow up with pcp. Given strict return precautions. Pt verbalized understanding.

## 2022-09-26 NOTE — ED PROVIDER NOTE - CLINICAL SUMMARY MEDICAL DECISION MAKING FREE TEXT BOX
61M PMH substance use disorder, HTN, asthma presenting with alcohol intoxication. States that he drank rum, unclear amount. Had recent ED visit 9/9 for alcohol intoxication. Denies fall, head trauma, LOC, cp/sob, abd pain, n/v. Obtaining labs

## 2022-09-26 NOTE — ED PROVIDER NOTE - PATIENT PORTAL LINK FT
You can access the FollowMyHealth Patient Portal offered by [facility] by registering at the following website: [email or web address]. By joining [employer]’s FollowMyHealth portal, you will also be able to view your health information using other applications (apps) compatible with our system.

## 2022-09-26 NOTE — ED ADULT NURSE NOTE - CHIEF COMPLAINT QUOTE
Pt. brought to [facility] ED by [facility] for intoxication. Pt. lives at home with his wife. He cannot say how much he drank. PMH:  HTN, asthma, ETOH. NKDA. Pt. placed on stretcher at ambulance bay. . Pt. undressed at triage. Belongings placed in property bag and placed outside of room 21.  NAD noted.

## 2022-09-26 NOTE — ED ADULT NURSE NOTE - ED STAT RN HANDOFF DETAILS
pt ambulatory with steady gait, walked to RW given shoes, pt awaiting taxi, SW updated to pt location.

## 2022-09-26 NOTE — ED ADULT NURSE NOTE - OBJECTIVE STATEMENT
Patient came in with the complaints of Intoxication. As per patient , he drank too much alcohol and denies use of drugs. Patient is sleepy but arousable to speech and oriented x 4 . Follows commands. Patient denies any fall/injury. Patient belongings outside room 21 and with security. Patient in no distress. Nursing care continues

## 2022-11-07 ENCOUNTER — EMERGENCY (EMERGENCY)
Facility: HOSPITAL | Age: 62
LOS: 1 days | Discharge: HOME | End: 2022-11-07
Attending: EMERGENCY MEDICINE | Admitting: EMERGENCY MEDICINE

## 2022-11-07 VITALS
DIASTOLIC BLOOD PRESSURE: 89 MMHG | TEMPERATURE: 98 F | HEART RATE: 100 BPM | SYSTOLIC BLOOD PRESSURE: 147 MMHG | HEIGHT: 71 IN | RESPIRATION RATE: 18 BRPM | OXYGEN SATURATION: 99 %

## 2022-11-07 PROCEDURE — 99283 EMERGENCY DEPT VISIT LOW MDM: CPT

## 2022-11-07 RX ORDER — ACETAMINOPHEN 500 MG
1000 TABLET ORAL ONCE
Refills: 0 | Status: DISCONTINUED | OUTPATIENT
Start: 2022-11-07 | End: 2022-11-07

## 2022-11-07 NOTE — ED PROVIDER NOTE - PROGRESS NOTE DETAILS
Amarilis Solano, PGY-1 DO:  Patient awake. Stating he is feeling better and is ready to go home. Patient is currently stable.

## 2022-11-07 NOTE — ED ADULT NURSE NOTE - NSIMPLEMENTINTERV_GEN_ALL_ED
Implemented All Universal Safety Interventions:  [place] to call system. Call bell, personal items and telephone within reach. Instruct patient to call for assistance. Room bathroom lighting operational. Non-slip footwear when patient is off stretcher. Physically safe environment: no spills, clutter or unnecessary equipment. Stretcher in lowest position, wheels locked, appropriate side rails in place.

## 2022-11-07 NOTE — ED PROVIDER NOTE - PATIENT PORTAL LINK FT
You can access the FollowMyHealth Patient Portal offered by [facility] by registering at the following website: [email or web address]. By joining [employer]’s FollowMyHealth portal, you will also be able to view your health information using other applications (apps) compatible with our system.

## 2022-11-07 NOTE — ED PROVIDER NOTE - PHYSICAL EXAMINATION
GENERAL: Awake, alert, NAD  HEENT: NC/AT, moist mucous membranes, PERRL, EOMI  LUNGS: CTAB, no wheezes or crackles   CARDIAC: RRR, no m/r/g  ABDOMEN: Soft, non tender, non distended, no rebound, no guarding  BACK: No midline spinal tenderness, no CVA tenderness  EXT: No edema, no calf tenderness, 2+ DP pulses bilaterally, no deformities.  NEURO: A&Ox3. Moving all extremities.  SKIN: Warm and dry. No rash.  PSYCH: mild agitation.

## 2022-11-07 NOTE — ED PROVIDER NOTE - OBJECTIVE STATEMENT
60 Y/o male pmhx Asthma, HLD, DM, and alcohol use disorder p/w chief complaint of alcohol intoxication. Patient states girlfriend called 911 because he was drinking. States he is in no pain otherwise. States that he has never had alcohol withdrawal, never had seizures d/t alcohol and has never been intubated before.    Denies N/V/fevers/chills/abdominal pain/cp/sob/visual or auditory hallucinations/HA/anxiety

## 2022-11-07 NOTE — ED PROVIDER NOTE - ATTENDING CONTRIBUTION TO CARE
61M p/w ETOH intox, h/o asthma DM.  His GF called due to intox.  No other c/o.  Pt drank maria a.  pt denies h/o ETOH w/d.  No N/V.  No hallucination.  VS ok here.  Labs previously checked a couple of months ago, aside from hyperglycemia were fine, check FS.  Exam benign aside from appears intoxicated.  Plan observe to sobriety, eval for withdrawal, PO chal, ambulatory trial.  If all neg can d/c home.   VS:  unremarkable    GEN - NAD;  appears intoxicated;   A+O x3  No tremor.   HEAD - NC/AT     ENT - PEERL, EOMI, mucous membranes  dry , no discharge      NECK: Neck supple, non-tender without lymphadenopathy, no masses, no JVD  PULM - CTA b/l,  symmetric breath sounds  COR -  normal heart sounds    ABD - , ND, NT, soft,  BACK - no CVA tenderness, nontender spine     EXTREMS - no edema, no deformity, warm and well perfused    SKIN - no rash    or bruising      NEUROLOGIC - alert, face symmetric, speech fluent, sensation nl, motor no focal deficit.

## 2022-11-07 NOTE — ED ADULT NURSE NOTE - OBJECTIVE STATEMENT
Pt. A&OX3, brought to 24a as an intox patient. Pt. states he drank Juma today. Unclear on how much or how often he drinks. Denies drug use. States he had an argument with his girlfriend which made him drink. No medical complaints at this time. MD at bedside for eval. Vitals stable. Will continue to monitor.

## 2022-11-07 NOTE — ED PROVIDER NOTE - CLINICAL SUMMARY MEDICAL DECISION MAKING FREE TEXT BOX
62 Y/o male pmhx Asthma, HLD, DM, and alcohol use disorder p/w chief complaint of alcohol intoxication. Patient denies- N/V/fevers/chills/abdominal pain/cp/sob/visual or auditory hallucinations/HA/anxiety. Vitals - WNL. Physical exam-unremarkable. Fingerstick: 116    Ddx: Alcohol intoxication  Plan to monitor patient for withdrawal symptoms.   Dispo - pending progression of patient, will continue to monitor and obtain BP and HR.

## 2022-11-08 VITALS
SYSTOLIC BLOOD PRESSURE: 136 MMHG | TEMPERATURE: 98 F | RESPIRATION RATE: 17 BRPM | DIASTOLIC BLOOD PRESSURE: 84 MMHG | OXYGEN SATURATION: 99 % | HEART RATE: 90 BPM

## 2022-11-09 RX ORDER — MAGNESIUM OXIDE 400 MG ORAL TABLET 241.3 MG
1 TABLET ORAL
Qty: 0 | Refills: 0 | DISCHARGE

## 2022-11-09 RX ORDER — ALBUTEROL 90 UG/1
2 AEROSOL, METERED ORAL
Qty: 0 | Refills: 0 | DISCHARGE

## 2022-11-09 RX ORDER — LISINOPRIL 2.5 MG/1
1 TABLET ORAL
Qty: 0 | Refills: 0 | DISCHARGE

## 2022-11-09 RX ORDER — FLUTICASONE PROPIONATE AND SALMETEROL 50; 250 UG/1; UG/1
0 POWDER ORAL; RESPIRATORY (INHALATION)
Qty: 12 | Refills: 0 | DISCHARGE

## 2022-11-09 RX ORDER — LACTOBACILLUS ACIDOPHILUS 100MM CELL
1 CAPSULE ORAL
Qty: 0 | Refills: 0 | DISCHARGE

## 2022-11-09 RX ORDER — ALBUTEROL 90 UG/1
0 AEROSOL, METERED ORAL
Qty: 150 | Refills: 0 | DISCHARGE

## 2022-11-09 RX ORDER — OMEGA-3 ACID ETHYL ESTERS 1 G
1 CAPSULE ORAL
Qty: 0 | Refills: 0 | DISCHARGE

## 2022-11-09 RX ORDER — ASCORBIC ACID 60 MG
1 TABLET,CHEWABLE ORAL
Qty: 0 | Refills: 0 | DISCHARGE

## 2022-11-09 RX ORDER — POTASSIUM GLUCONATE 2.5 MEQ
1 TABLET ORAL
Qty: 0 | Refills: 0 | DISCHARGE

## 2022-11-09 RX ORDER — LANOLIN ALCOHOL/MO/W.PET/CERES
1 CREAM (GRAM) TOPICAL
Qty: 0 | Refills: 0 | DISCHARGE

## 2022-11-09 RX ORDER — LISINOPRIL 2.5 MG/1
0 TABLET ORAL
Qty: 30 | Refills: 0 | DISCHARGE

## 2022-11-09 RX ORDER — MONTELUKAST 4 MG/1
0 TABLET, CHEWABLE ORAL
Qty: 30 | Refills: 0 | DISCHARGE

## 2022-11-23 NOTE — H&P ADULT - PROBLEM/PLAN-3
[de-identified] : Sterile Technique Injection \par 2 Syringes of 5 cc 1 % Lidocaine HCL \par \par left Gluteus medius \par left Gluteus yoana \par Left piriformis\par Ice injection site PRN \par Injection tolerated well.\par \par \par \par \par Flexion 45 degrees\par Extension 10 degrees\par Lateral Bend R 22 degrees L 20 degrees \par \par Palp: Trigger point involving the bilateral gluteal musculature with greater involvement on the left DISPLAY PLAN FREE TEXT

## 2023-04-21 NOTE — PROGRESS NOTE ADULT - PROBLEM SELECTOR PLAN 6
Patient Consult Note    TIME IN: 10.00  TIME OUT: 10:27    Primary care physician: Erasto Nassar M.D.    Reason for consult: Management of Uncontrolled Type 2 Diabetes    HPI:  Melanie Otto is a 79 y.o. old patient who comes in today for evaluation of above stated problem.    Most Recent HbA1c and POCT glucose:   Lab Results   Component Value Date/Time    HBA1C 8.6 (H) 03/14/2023 09:08 AM            Most Recent Scr:  Lab Results   Component Value Date/Time    CREATININE 1.53 (H) 03/14/2023 09:08 AM        Current Diabetes Medication Regimen  SGLT-2 Inhibitor: Jardiance 25 mg     Basal Insulin: Lantus 14 U    Previous Diabetes Medications and Reason for Discontinuation  Levemir to Lantus based on ins formulary     Pt has home glucometer and proper testing technique -  Discussed BG Goals: FBG 80 - 130, 2hPP < 180, A1c < 7%    Pt reports blood sugars:   Before Breakfast: 117, 133, 150, 129, 140, 120, 161, 160, 123, 147, 141, 138, 115, 126, 108, 113, 104, 100, 99, 127    Other times:   7 day- 131  14 day -136  30 day- 125    Hypoglycemia awareness -   Nocturnal hypoglycemia- None  Hypoglycemia:  None    Pt's treatment of Hypoglycemia   - 15:15 Rule    Current Exercise - Very active, especially when back home in [facility]  Exercise Goal -  Increase exercise in the states as tolerated, resume activity upon return to [facility]. 30 mins X 5 days/week    Foot Exam:  Monofilament exam - 03/2023    Preventative Management  BP regimen (ACE/ARB) - Lisinopril 40 mg daily   Statin - Rosuvastatin 40 mg daily   LDL <100 - Yes  Last Retinal Scan - 03/2023  Last Microalbumin/Cr - 03/2023  Last A1c -   Lab Results   Component Value Date/Time    HBA1C 8.6 (H) 03/14/2023 09:08 AM          updated caregaps    Past Medical History:  Patient Active Problem List    Diagnosis Date Noted    CKD stage 3 due to type 2 diabetes mellitus (HCC) 03/18/2023    Proteinuria 03/14/2023    Anemia 03/14/2023    Diabetic polyneuropathy associated with type  2 diabetes mellitus (HCC) 01/25/2023    Hypertension associated with diabetes (HCC) 01/25/2023    Dyslipidemia due to type 2 diabetes mellitus (HCC) 01/25/2023    Vitamin D deficiency 01/25/2023    History of transient ischemic attack (TIA) 01/25/2023       Past Surgical History:  No past surgical history on file.    Allergies:  Patient has no known allergies.    Social History:  Social History     Socioeconomic History    Marital status:      Spouse name: Not on file    Number of children: Not on file    Years of education: Not on file    Highest education level: Not on file   Occupational History    Not on file   Tobacco Use    Smoking status: Never    Smokeless tobacco: Never   Vaping Use    Vaping Use: Never used   Substance and Sexual Activity    Alcohol use: Not Currently    Drug use: Never    Sexual activity: Not on file   Other Topics Concern    Not on file   Social History Narrative    Not on file     Social Determinants of Health     Financial Resource Strain: Not on file   Food Insecurity: Not on file   Transportation Needs: Not on file   Physical Activity: Not on file   Stress: Not on file   Social Connections: Not on file   Intimate Partner Violence: Not on file   Housing Stability: Not on file       Family History:  Family History   Problem Relation Age of Onset    Heart Disease Mother        Medications:    Current Outpatient Medications:     Insulin Pen Needle (PEN NEEDLES) 32G X 4 MM Misc, For insulin injection daily, Disp: 100 Each, Rfl: 3    lisinopril (PRINIVIL) 40 MG tablet, Take 2 Tablets by mouth every day., Disp: 60 Tablet, Rfl: 5    clopidogrel (PLAVIX) 75 MG Tab, Take 1 Tablet by mouth every day., Disp: 30 Tablet, Rfl: 0    Empagliflozin 25 MG Tab, Take 25 mg by mouth every day., Disp: 30 Tablet, Rfl: 0    insulin glargine 100 UNIT/ML SC SOPN injection, Inject 10 Units under the skin every evening., Disp: 3 Each, Rfl: 5    rosuvastatin (CRESTOR) 40 MG tablet, Take 1 Tablet by mouth  every day., Disp: 30 Tablet, Rfl: 0    Blood Glucose Meter Kit, Test blood sugar as recommended by provider.  blood glucose monitoring kit., Disp: 1 Kit, Rfl: 0    Blood Glucose Test Strips, Use one  strip to test blood sugar once daily early morning before first meal., Disp: 100 Strip, Rfl: 0    Lancets, Use one lancet to test blood sugar once daily early morning before first meal., Disp: 100 Each, Rfl: 0    Cholecalciferol (VITAMIN D3) 2000 UNIT Cap, Take  by mouth every day., Disp: , Rfl:     Labs: Reviewed    Physical Examination:  Vital signs: There were no vitals taken for this visit. There is no height or weight on file to calculate BMI.    Assessment and Plan:    1. DM2  Basic physiology of DMII was explained to patient as well as microvascular/macrovascular complications. The importance of increasing physical activity to improve diabetes control was discussed with the patient. Patient was also educated on changing diet and making better choices to help control blood sugar.   Discussed Goals: FBG 80 - 130, 2hPP < 180, a1c < 7.0%   Patient has increased basal insulin Lantus to 14 U as directed. Fasting blood sugars recorded above mainly at goal. Patient denies any hypoglycemias. Patient is to stop titrating up basal insulin.  Patient will be returning to [facility] which is her home, we discussed the importance of testing post prandial BG to ensure those are at a goal of less than 180. We also discussed the possibility of re-initiation of Trulicity as she was on this in the past and is unsure why it was discontinued. She will discuss this with her PCP.    - Medication changes:  Continue on current regimen  Discuss re-initiation of Trulicity with PCP     - Lifestyle changes:  In addition to fasting BG, start testing post prandial BG 3 days a week    Follow Up:  As needed once she returns from [facility]    Rd Mcintyre, RosinaD    CC:   Erasto Nassar M.D.     Continue PPI.

## 2023-05-17 NOTE — ED PROVIDER NOTE - OBJECTIVE STATEMENT
depression 59 yo male with PMH of etoh abuse, asthma, HTN, presents to the ED for intoxication. Pt BIBA, called 911 for intoxication. States he has been unemployed for 6-7 years, previously worked as , depressed 2/2 unemployment but denies any active or previous suicidal ideation or attempt, denies hallucinations or HI. Pt calm, cooperative with exam, A&O x3. Endorses drinking beer last night and today but unable to specify exact amount (states "at least 6 beers"). Appears clinically intoxicated with smell of etoh on breath. Pt lives with his girlfriend, she is at work and unable to come to ED at this time. Only relative, sister, lives in [place]. Denies fever, chills, n/v, abdominal pain, chest pain, SOB, palpitations, headache, other complaints. No h/o trauma. Denies cough, wheezing.

## 2023-07-16 ENCOUNTER — EMERGENCY (EMERGENCY)
Facility: HOSPITAL | Age: 63
LOS: 1 days | Discharge: HOME | End: 2023-07-16
Attending: EMERGENCY MEDICINE | Admitting: EMERGENCY MEDICINE
Payer: COMMERCIAL

## 2023-07-16 VITALS
OXYGEN SATURATION: 98 % | TEMPERATURE: 98 F | DIASTOLIC BLOOD PRESSURE: 70 MMHG | RESPIRATION RATE: 19 BRPM | SYSTOLIC BLOOD PRESSURE: 122 MMHG | HEART RATE: 94 BPM

## 2023-07-16 VITALS
SYSTOLIC BLOOD PRESSURE: 164 MMHG | RESPIRATION RATE: 20 BRPM | TEMPERATURE: 98 F | DIASTOLIC BLOOD PRESSURE: 104 MMHG | OXYGEN SATURATION: 99 % | HEART RATE: 104 BPM

## 2023-07-16 PROCEDURE — 93010 ELECTROCARDIOGRAM REPORT: CPT

## 2023-07-16 PROCEDURE — 99283 EMERGENCY DEPT VISIT LOW MDM: CPT

## 2023-07-16 NOTE — ED ADULT NURSE NOTE - CCCP TRG CHIEF CMPLNT
[Good] : ~his/her~ current health as good [Very Good] : ~his/her~  mood as very good [Yes] : Yes [2 - 4 times a month (2 pts)] : 2-4 times a month (2 points) [1 or 2 (0 pts)] : 1 or 2 (0 points) [Never (0 pts)] : Never (0 points) [No] : In the past 12 months have you used drugs other than those required for medical reasons? No [No falls in past year] : Patient reported no falls in the past year etoh [0] : 2) Feeling down, depressed, or hopeless: Not at all (0) [Patient declined Retinal Exam] : Patient declined Retinal Exam. [Patient reported colonoscopy was normal] : Patient reported colonoscopy was normal [None] : None [With Significant Other] : lives with significant other [# of Members in Household ___] :  household currently consist of [unfilled] member(s) [Employed] : employed [College] : College [] :  [# Of Children ___] : has [unfilled] children [Sexually Active] : sexually active [Smoke Detector] : smoke detector [Carbon Monoxide Detector] : carbon monoxide detector [Sunscreen] : uses sunscreen [PHQ-2 Negative - No further assessment needed] : PHQ-2 Negative - No further assessment needed [HIV test declined] : HIV test declined [Hepatitis C test declined] : Hepatitis C test declined [Reports normal functional visual acuity (ie: able to read med bottle)] : Reports normal functional visual acuity [Seat Belt] :  uses seat belt [With Patient/Caregiver] : , with patient/caregiver [DNR] : DNR [FreeTextEntry1] : right lower back and buttock [] : No [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 [de-identified] : socially  [Audit-CScore] : 2 [de-identified] : H I T 3 x week [de-identified] : Fair [FAD8Uhuey] : 0 [Change in mental status noted] : No change in mental status noted [High Risk Behavior] : no high risk behavior [Reports changes in hearing] : Reports no changes in hearing [Reports changes in vision] : Reports no changes in vision [Reports changes in dental health] : Reports no changes in dental health [Guns at Home] : no guns at home [Safety elements used in home] : no safety elements used in home [Travel to Developing Areas] : does not  travel to developing areas [Caregiver Concerns] : does not have caregiver concerns [ColonoscopyDate] : 08/10 [ColonoscopyComments] : Cologuamiguel 2019- negative [de-identified] : last exam beginning 2020 [de-identified] : last exam 7/2021 [AdvancecareDate] : 08/21

## 2023-07-16 NOTE — ED PROVIDER NOTE - OBJECTIVE STATEMENT
61 y/o male pmhx Asthma, HLD, DM, and alcohol use disorder p/w chief complaint of alcohol intoxication. Patient states girlfriend called 911 because he was drinking. States he is in no pain otherwise. States that he has had alcohol withdrawal however never had seizures d/t alcohol and has never been intubated before.  Denies nausea, vomiting, diarrhea, fevers/chills/abdominal pain/cp/sob/visual or auditory hallucinations/HA/anxiety. States that he was drinking vodka today. Denies illicit drug use.

## 2023-07-16 NOTE — ED PROVIDER NOTE - PATIENT PORTAL LINK FT
You can access the FollowMyHealth Patient Portal offered by [facility] by registering at the following website: [email or web address]. By joining [employer]’s FollowMyHealth portal, you will also be able to view your health information using other applications (apps) compatible with our system.

## 2023-07-16 NOTE — ED ADULT NURSE REASSESSMENT NOTE - NS ED NURSE REASSESS COMMENT FT1
Break Coverage RN:  Pt resting in stretcher at this time, offers no complaints. Respirations equal and unlabored. Pt provided with a meal. No acute distress noted. Safety maintained, bed in lowest position, side rails raised.

## 2023-07-16 NOTE — ED PROVIDER NOTE - PROGRESS NOTE DETAILS
Kadi Christiansen DO (PGY3): Patient signed out to me by day team.  Patient reassessed.  Patient is clinically sober at this time.  Patient is alert and oriented x4.  Patient ambulating in the emergency department without assistance.  Patient's vital stable.  Tolerating p.o.  Plan for discharge. Pt made aware of plan. Questions regarding their symptoms were addressed. Advised to follow up with pcp . Given strict return precautions. Pt verbalized understanding.

## 2023-07-16 NOTE — ED PROVIDER NOTE - PHYSICAL EXAMINATION
Griffin Barker DO (PGY2)   Physical Exam:    Gen: acutely intoxicated   Head: NCAT  HEENT: EOMI, PEERLA  Lung: CTAB, no respiratory distress, no wheezes/rhonchi/rales B/L  CV: RRR, no murmurs, rubs or gallops  Abd: soft, NT, ND, no guarding, no rigidity, no rebound tenderness, no CVA tenderness   MSK: no visible deformities, ROM normal in UE/LE, no back pain  Neuro: No focal sensory or motor deficits  Skin: Warm, well perfused, no rash, no leg swelling

## 2023-07-16 NOTE — ED PROVIDER NOTE - ATTENDING CONTRIBUTION TO CARE
Here with above, patient presents emergency department intoxicated.  Has had multiple previous visits for similar.  No evidence of trauma, no evidence of withdrawal, patient currently slurring speech with alcohol on breath, plan to metabolize and reassess for safety for discharge.

## 2023-07-16 NOTE — ED PROVIDER NOTE - NSICDXPASTMEDICALHX_GEN_ALL_CORE_FT
PAST MEDICAL HISTORY:  Alcohol intoxication     Asthma     Asthma     Chronic GERD     Gastritis     Gastroenteritis, acute     GERD (gastroesophageal reflux disease)     Gout     History of alcohol use disorder     Hyperlipidemia     Hyperlipidemia     Hypertension     Hypertension

## 2023-07-16 NOTE — ED ADULT NURSE NOTE - OBJECTIVE STATEMENT
Pt is alert and orientedx4, ambulatory at baseline. Pt presents to the ED with intoxication from home. Pt has PMHx of a fib. Pt can bee uncooperative but is able to be redirected. Pt denies chest pain, shortness of breath, dyspnea on exertion, breathing is unlabored and even. Pt denies nausea, vomiting or diarrhea. awaiting further orders. Call bell within reach, bed in lowest position, will continue to monitor.

## 2023-07-16 NOTE — ED PROVIDER NOTE - NSFOLLOWUPINSTRUCTIONS_ED_ALL_ED_FT
Alcohol Abuse    Alcohol intoxication occurs when the amount of alcohol that a person has consumed impairs his or her ability to mentally and physically function. Chronic alcohol consumption can also lead to a variety of health issues including neurological disease, stomach disease, heart disease, liver disease, etc. Do not drive after drinking alcohol. Drinking enough alcohol to end up in an Emergency Room suggests you may have an alcohol abuse problem. Seek help at a drug addiction center.    SEEK IMMEDIATE MEDICAL CARE IF YOU HAVE ANY OF THE FOLLOWING SYMPTOMS: seizures, vomiting blood, blood in your stool, lightheadedness/dizziness, or becoming shaky to tremulous when you stop drinking.    Please follow up with your primary medical doctor within two days.  Return to the emergency department if you feel that your condition is worsening

## 2023-07-16 NOTE — ED PROVIDER NOTE - CLINICAL SUMMARY MEDICAL DECISION MAKING FREE TEXT BOX
61 y/o male pmhx Asthma, HLD, DM, and alcohol use disorder p/w chief complaint of alcohol intoxication. Patient states girlfriend called 911 because he was drinking. States he is in no pain otherwise. States that he has had alcohol withdrawal however never had seizures d/t alcohol and has never been intubated before.  Denies nausea, vomiting, diarrhea, fevers/chills/abdominal pain/cp/sob/visual or auditory hallucinations/HA/anxiety. States that he was drinking vodka today. Denies illicit drug use.  Vital signs stable, afebrile, not hypoxic. . Nonfocal PE   Plan for MTF, and monitor for acute withdrawal

## 2023-07-16 NOTE — ED ADULT TRIAGE NOTE - CHIEF COMPLAINT QUOTE
Pt presents to ED via EMS from home with c/o intoxication. Pt's wife called 911 because he was intoxicated. Pt denies drinking daily but endorses hx of withdrawal. Pt denies physical complaints at this time. Pt undressed, belongings placed outside of Rm 21, valuables secured with security.

## 2023-07-16 NOTE — ED ADULT NURSE REASSESSMENT NOTE - NS ED NURSE REASSESS COMMENT FT1
Pt alert and orientedx4, in no apparent distress. Pt able to ambulate without assistance, but pt still showing signs of intoxication and being inappropriate with staff. Pt denies chest pain, SOB, lightheadedness, pain. Call bell within reach, bed in lowest position, will continue to monitor.

## 2023-07-17 PROBLEM — M10.9 GOUT, UNSPECIFIED: Chronic | Status: ACTIVE | Noted: 2019-09-06

## 2023-07-17 PROBLEM — K21.9 GASTRO-ESOPHAGEAL REFLUX DISEASE WITHOUT ESOPHAGITIS: Chronic | Status: ACTIVE | Noted: 2019-09-06

## 2023-07-17 PROBLEM — Z87.898 PERSONAL HISTORY OF OTHER SPECIFIED CONDITIONS: Chronic | Status: ACTIVE | Noted: 2020-08-14

## 2023-07-17 PROBLEM — E78.5 HYPERLIPIDEMIA, UNSPECIFIED: Chronic | Status: ACTIVE | Noted: 2020-08-14

## 2023-07-17 PROBLEM — K29.70 GASTRITIS, UNSPECIFIED, WITHOUT BLEEDING: Chronic | Status: ACTIVE | Noted: 2019-09-06

## 2023-11-15 NOTE — H&P ADULT - NSHPRISKHIVSCREEN_GEN_ALL_CORE
Remote Patient Monitoring Note      Date/Time:  11/15/2023 8:12 AM  Patient Current Location: Home: [address] [place] [id number]  LPN attempted to contacted patient by telephone regarding red alert received for weight increase (231.5 lb). Background: CHF, COPD, HTN  Clinical Interventions:  Call place to patient. Unable to reach patient or leave message. Plan/Follow Up: Will continue to review, monitor and address alerts with follow up based on severity of symptoms and risk factors. Offered and patient declined

## 2024-01-30 NOTE — BEHAVIORAL HEALTH ASSESSMENT NOTE - NSBHCHARTREVIEWIMAGING_PSY_A_CORE FT
EXAM:  CT BRAIN        PROCEDURE DATE:  Aug 14 2020         INTERPRETATION:  CLINICAL INFORMATION: Slurred speech, alcohol intoxication.    TECHNIQUE: Sequential axial images were obtained from the vertex to the skull base without intravenous contrast. Coronal and sagittal reformations were obtained.    COMPARISON: CT head from 2/7/2019.    FINDINGS:    There is no acute intracranial hemorrhage or mass effect. There is no CT evidence of acute territorial infarct.    Prominence of the ventriclesand sulci, compatible with age related volume loss.    The calvarium is intact.    Maxillary and sphenoid sinus mucosal thickening. Moderate ethmoid sinus opacification.    IMPRESSION:  No acute intracranial hemorrhage, mass effect, or CT evidence ofacute territorial infarct.
No

## 2024-02-06 NOTE — ED ADULT TRIAGE NOTE - SOURCE OF INFORMATION
AVS from 2/6/24    Rtc in 3 months         Rv on 5/7/24     PT was given AVS and appt schedule    Electronically signed by Dina Hagan on 2/6/2024 at 2:44 PM    
Patient

## 2024-06-18 ENCOUNTER — INPATIENT (INPATIENT)
Facility: HOSPITAL | Age: 64
LOS: 2 days | Discharge: HOME | End: 2024-06-21
Attending: INTERNAL MEDICINE | Admitting: INTERNAL MEDICINE
Payer: COMMERCIAL

## 2024-06-18 VITALS
SYSTOLIC BLOOD PRESSURE: 80 MMHG | DIASTOLIC BLOOD PRESSURE: 49 MMHG | HEART RATE: 88 BPM | OXYGEN SATURATION: 94 % | RESPIRATION RATE: 16 BRPM | TEMPERATURE: 98 F

## 2024-06-18 PROCEDURE — 99285 EMERGENCY DEPT VISIT HI MDM: CPT

## 2024-06-18 RX ORDER — SODIUM CHLORIDE 0.9 % (FLUSH) 0.9 %
2000 SYRINGE (ML) INJECTION ONCE
Refills: 0 | Status: COMPLETED | OUTPATIENT
Start: 2024-06-18 | End: 2024-06-18
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
## 2024-06-19 DIAGNOSIS — I95.9 HYPOTENSION, UNSPECIFIED: ICD-10-CM

## 2024-06-19 DIAGNOSIS — N17.9 ACUTE KIDNEY FAILURE, UNSPECIFIED: ICD-10-CM

## 2024-06-19 DIAGNOSIS — J45.909 UNSPECIFIED ASTHMA, UNCOMPLICATED: ICD-10-CM

## 2024-06-19 DIAGNOSIS — R74.01 ELEVATION OF LEVELS OF LIVER TRANSAMINASE LEVELS: ICD-10-CM

## 2024-06-19 DIAGNOSIS — F10.939 ALCOHOL USE, UNSPECIFIED WITH WITHDRAWAL, UNSPECIFIED: ICD-10-CM

## 2024-06-19 DIAGNOSIS — R59.0 LOCALIZED ENLARGED LYMPH NODES: ICD-10-CM

## 2024-06-19 DIAGNOSIS — Z29.9 ENCOUNTER FOR PROPHYLACTIC MEASURES, UNSPECIFIED: ICD-10-CM

## 2024-06-19 LAB
ADD ON TEST-SPECIMEN IN LAB: SIGNIFICANT CHANGE UP
ALBUMIN SERPL ELPH-MCNC: 4 G/DL — SIGNIFICANT CHANGE UP (ref 3.3–5)
ALBUMIN SERPL ELPH-MCNC: 4.1 G/DL — SIGNIFICANT CHANGE UP (ref 3.3–5)
ALP SERPL-CCNC: 55 U/L — SIGNIFICANT CHANGE UP (ref 40–120)
ALP SERPL-CCNC: 67 U/L — SIGNIFICANT CHANGE UP (ref 40–120)
ALT FLD-CCNC: 41 U/L — SIGNIFICANT CHANGE UP (ref 4–41)
ALT FLD-CCNC: 44 U/L — HIGH (ref 4–41)
AMMONIA BLD-MCNC: 15 UMOL/L — SIGNIFICANT CHANGE UP (ref 11–55)
AMPHET UR-MCNC: NEGATIVE — SIGNIFICANT CHANGE UP
ANION GAP SERPL CALC-SCNC: 15 MMOL/L — HIGH (ref 7–14)
ANION GAP SERPL CALC-SCNC: 19 MMOL/L — HIGH (ref 7–14)
ANION GAP SERPL CALC-SCNC: 21 MMOL/L — HIGH (ref 7–14)
APPEARANCE UR: CLEAR — SIGNIFICANT CHANGE UP
APPEARANCE UR: CLEAR — SIGNIFICANT CHANGE UP
AST SERPL-CCNC: 49 U/L — HIGH (ref 4–40)
AST SERPL-CCNC: 51 U/L — HIGH (ref 4–40)
BACTERIA # UR AUTO: NEGATIVE /HPF — SIGNIFICANT CHANGE UP
BACTERIA # UR AUTO: NEGATIVE /HPF — SIGNIFICANT CHANGE UP
BARBITURATES UR SCN-MCNC: NEGATIVE — SIGNIFICANT CHANGE UP
BASE EXCESS BLDV CALC-SCNC: -2 MMOL/L — SIGNIFICANT CHANGE UP (ref -2–3)
BASE EXCESS BLDV CALC-SCNC: -6.4 MMOL/L — LOW (ref -2–3)
BASE EXCESS BLDV CALC-SCNC: -6.6 MMOL/L — LOW (ref -2–3)
BASOPHILS # BLD AUTO: 0.1 K/UL — SIGNIFICANT CHANGE UP (ref 0–0.2)
BASOPHILS # BLD AUTO: 0.12 K/UL — SIGNIFICANT CHANGE UP (ref 0–0.2)
BASOPHILS NFR BLD AUTO: 1.2 % — SIGNIFICANT CHANGE UP (ref 0–2)
BASOPHILS NFR BLD AUTO: 1.4 % — SIGNIFICANT CHANGE UP (ref 0–2)
BENZODIAZ UR-MCNC: NEGATIVE — SIGNIFICANT CHANGE UP
BILIRUB SERPL-MCNC: 0.3 MG/DL — SIGNIFICANT CHANGE UP (ref 0.2–1.2)
BILIRUB SERPL-MCNC: 0.7 MG/DL — SIGNIFICANT CHANGE UP (ref 0.2–1.2)
BILIRUB UR-MCNC: NEGATIVE — SIGNIFICANT CHANGE UP
BILIRUB UR-MCNC: NEGATIVE — SIGNIFICANT CHANGE UP
BLOOD GAS VENOUS COMPREHENSIVE RESULT: SIGNIFICANT CHANGE UP
BUN SERPL-MCNC: 11 MG/DL — SIGNIFICANT CHANGE UP (ref 7–23)
BUN SERPL-MCNC: 12 MG/DL — SIGNIFICANT CHANGE UP (ref 7–23)
BUN SERPL-MCNC: 13 MG/DL — SIGNIFICANT CHANGE UP (ref 7–23)
CALCIUM SERPL-MCNC: 7.7 MG/DL — LOW (ref 8.4–10.5)
CALCIUM SERPL-MCNC: 8.4 MG/DL — SIGNIFICANT CHANGE UP (ref 8.4–10.5)
CALCIUM SERPL-MCNC: 8.6 MG/DL — SIGNIFICANT CHANGE UP (ref 8.4–10.5)
CAST: 1 /LPF — SIGNIFICANT CHANGE UP (ref 0–4)
CAST: 1 /LPF — SIGNIFICANT CHANGE UP (ref 0–4)
CHLORIDE BLDV-SCNC: 103 MMOL/L — SIGNIFICANT CHANGE UP (ref 96–108)
CHLORIDE BLDV-SCNC: 104 MMOL/L — SIGNIFICANT CHANGE UP (ref 96–108)
CHLORIDE BLDV-SCNC: 98 MMOL/L — SIGNIFICANT CHANGE UP (ref 96–108)
CHLORIDE SERPL-SCNC: 100 MMOL/L — SIGNIFICANT CHANGE UP (ref 98–107)
CHLORIDE SERPL-SCNC: 96 MMOL/L — LOW (ref 98–107)
CHLORIDE SERPL-SCNC: 99 MMOL/L — SIGNIFICANT CHANGE UP (ref 98–107)
CHLORIDE UR-SCNC: 45 MMOL/L — SIGNIFICANT CHANGE UP
CO2 BLDV-SCNC: 19.3 MMOL/L — LOW (ref 22–26)
CO2 BLDV-SCNC: 20.2 MMOL/L — LOW (ref 22–26)
CO2 BLDV-SCNC: 24.3 MMOL/L — SIGNIFICANT CHANGE UP (ref 22–26)
CO2 SERPL-SCNC: 17 MMOL/L — LOW (ref 22–31)
CO2 SERPL-SCNC: 20 MMOL/L — LOW (ref 22–31)
CO2 SERPL-SCNC: 23 MMOL/L — SIGNIFICANT CHANGE UP (ref 22–31)
COCAINE METAB.OTHER UR-MCNC: NEGATIVE — SIGNIFICANT CHANGE UP
COLOR SPEC: YELLOW — SIGNIFICANT CHANGE UP
COLOR SPEC: YELLOW — SIGNIFICANT CHANGE UP
CREAT ?TM UR-MCNC: 140 MG/DL — SIGNIFICANT CHANGE UP
CREAT SERPL-MCNC: 0.97 MG/DL — SIGNIFICANT CHANGE UP (ref 0.5–1.3)
CREAT SERPL-MCNC: 1.54 MG/DL — HIGH (ref 0.5–1.3)
CREAT SERPL-MCNC: 1.64 MG/DL — HIGH (ref 0.5–1.3)
CREATININE URINE RESULT, DAU: 38 MG/DL — SIGNIFICANT CHANGE UP
DIFF PNL FLD: ABNORMAL
DIFF PNL FLD: NEGATIVE — SIGNIFICANT CHANGE UP
EGFR: 47 ML/MIN/1.73M2 — LOW
EGFR: 51 ML/MIN/1.73M2 — LOW
EGFR: 89 ML/MIN/1.73M2 — SIGNIFICANT CHANGE UP
EOSINOPHIL # BLD AUTO: 0.28 K/UL — SIGNIFICANT CHANGE UP (ref 0–0.5)
EOSINOPHIL # BLD AUTO: 0.36 K/UL — SIGNIFICANT CHANGE UP (ref 0–0.5)
EOSINOPHIL NFR BLD AUTO: 3.6 % — SIGNIFICANT CHANGE UP (ref 0–6)
EOSINOPHIL NFR BLD AUTO: 3.8 % — SIGNIFICANT CHANGE UP (ref 0–6)
ETHANOL SERPL-MCNC: 322 MG/DL — HIGH
FENTANYL UR QL SCN: NEGATIVE — SIGNIFICANT CHANGE UP
FLUAV AG NPH QL: SIGNIFICANT CHANGE UP
FLUBV AG NPH QL: SIGNIFICANT CHANGE UP
FOLATE SERPL-MCNC: >20 NG/ML — HIGH (ref 3.1–17.5)
GAS PNL BLDV: 133 MMOL/L — LOW (ref 136–145)
GAS PNL BLDV: 134 MMOL/L — LOW (ref 136–145)
GAS PNL BLDV: 135 MMOL/L — LOW (ref 136–145)
GLUCOSE BLDV-MCNC: 116 MG/DL — HIGH (ref 70–99)
GLUCOSE BLDV-MCNC: 130 MG/DL — HIGH (ref 70–99)
GLUCOSE BLDV-MCNC: 140 MG/DL — HIGH (ref 70–99)
GLUCOSE SERPL-MCNC: 113 MG/DL — HIGH (ref 70–99)
GLUCOSE SERPL-MCNC: 136 MG/DL — HIGH (ref 70–99)
GLUCOSE SERPL-MCNC: 146 MG/DL — HIGH (ref 70–99)
GLUCOSE UR QL: NEGATIVE MG/DL — SIGNIFICANT CHANGE UP
GLUCOSE UR QL: NEGATIVE MG/DL — SIGNIFICANT CHANGE UP
HCO3 BLDV-SCNC: 18 MMOL/L — LOW (ref 22–29)
HCO3 BLDV-SCNC: 19 MMOL/L — LOW (ref 22–29)
HCO3 BLDV-SCNC: 23 MMOL/L — SIGNIFICANT CHANGE UP (ref 22–29)
HCT VFR BLD CALC: 39 % — SIGNIFICANT CHANGE UP (ref 39–50)
HCT VFR BLD CALC: 40.6 % — SIGNIFICANT CHANGE UP (ref 39–50)
HCT VFR BLDA CALC: 39 % — SIGNIFICANT CHANGE UP (ref 39–51)
HCT VFR BLDA CALC: 40 % — SIGNIFICANT CHANGE UP (ref 39–51)
HCT VFR BLDA CALC: 41 % — SIGNIFICANT CHANGE UP (ref 39–51)
HGB BLD CALC-MCNC: 13.1 G/DL — SIGNIFICANT CHANGE UP (ref 12.6–17.4)
HGB BLD CALC-MCNC: 13.4 G/DL — SIGNIFICANT CHANGE UP (ref 12.6–17.4)
HGB BLD CALC-MCNC: 13.5 G/DL — SIGNIFICANT CHANGE UP (ref 12.6–17.4)
HGB BLD-MCNC: 13.1 G/DL — SIGNIFICANT CHANGE UP (ref 13–17)
HGB BLD-MCNC: 13.9 G/DL — SIGNIFICANT CHANGE UP (ref 13–17)
IANC: 3.46 K/UL — SIGNIFICANT CHANGE UP (ref 1.8–7.4)
IANC: 4.59 K/UL — SIGNIFICANT CHANGE UP (ref 1.8–7.4)
IMM GRANULOCYTES NFR BLD AUTO: 0.3 % — SIGNIFICANT CHANGE UP (ref 0–0.9)
IMM GRANULOCYTES NFR BLD AUTO: 0.4 % — SIGNIFICANT CHANGE UP (ref 0–0.9)
KETONES UR-MCNC: 40 MG/DL
KETONES UR-MCNC: ABNORMAL MG/DL
LACTATE BLDV-MCNC: 4.1 MMOL/L — CRITICAL HIGH (ref 0.5–2)
LACTATE BLDV-MCNC: 4.9 MMOL/L — CRITICAL HIGH (ref 0.5–2)
LACTATE BLDV-MCNC: 5.1 MMOL/L — CRITICAL HIGH (ref 0.5–2)
LACTATE SERPL-SCNC: 1.6 MMOL/L — SIGNIFICANT CHANGE UP (ref 0.5–2)
LDH SERPL L TO P-CCNC: 207 U/L — SIGNIFICANT CHANGE UP (ref 135–225)
LEUKOCYTE ESTERASE UR-ACNC: NEGATIVE — SIGNIFICANT CHANGE UP
LEUKOCYTE ESTERASE UR-ACNC: NEGATIVE — SIGNIFICANT CHANGE UP
LIDOCAIN IGE QN: 16 U/L — SIGNIFICANT CHANGE UP (ref 7–60)
LYMPHOCYTES # BLD AUTO: 2.46 K/UL — SIGNIFICANT CHANGE UP (ref 1–3.3)
LYMPHOCYTES # BLD AUTO: 3.95 K/UL — HIGH (ref 1–3.3)
LYMPHOCYTES # BLD AUTO: 33.7 % — SIGNIFICANT CHANGE UP (ref 13–44)
LYMPHOCYTES # BLD AUTO: 39.1 % — SIGNIFICANT CHANGE UP (ref 13–44)
MAGNESIUM SERPL-MCNC: 1.8 MG/DL — SIGNIFICANT CHANGE UP (ref 1.6–2.6)
MAGNESIUM SERPL-MCNC: 1.9 MG/DL — SIGNIFICANT CHANGE UP (ref 1.6–2.6)
MCHC RBC-ENTMCNC: 28.8 PG — SIGNIFICANT CHANGE UP (ref 27–34)
MCHC RBC-ENTMCNC: 29 PG — SIGNIFICANT CHANGE UP (ref 27–34)
MCHC RBC-ENTMCNC: 33.6 GM/DL — SIGNIFICANT CHANGE UP (ref 32–36)
MCHC RBC-ENTMCNC: 34.2 GM/DL — SIGNIFICANT CHANGE UP (ref 32–36)
MCV RBC AUTO: 84.8 FL — SIGNIFICANT CHANGE UP (ref 80–100)
MCV RBC AUTO: 85.7 FL — SIGNIFICANT CHANGE UP (ref 80–100)
METHADONE UR-MCNC: NEGATIVE — SIGNIFICANT CHANGE UP
MONOCYTES # BLD AUTO: 0.97 K/UL — HIGH (ref 0–0.9)
MONOCYTES # BLD AUTO: 1.03 K/UL — HIGH (ref 0–0.9)
MONOCYTES NFR BLD AUTO: 10.2 % — SIGNIFICANT CHANGE UP (ref 2–14)
MONOCYTES NFR BLD AUTO: 13.3 % — SIGNIFICANT CHANGE UP (ref 2–14)
NEUTROPHILS # BLD AUTO: 3.46 K/UL — SIGNIFICANT CHANGE UP (ref 1.8–7.4)
NEUTROPHILS # BLD AUTO: 4.59 K/UL — SIGNIFICANT CHANGE UP (ref 1.8–7.4)
NEUTROPHILS NFR BLD AUTO: 45.5 % — SIGNIFICANT CHANGE UP (ref 43–77)
NEUTROPHILS NFR BLD AUTO: 47.5 % — SIGNIFICANT CHANGE UP (ref 43–77)
NITRITE UR-MCNC: NEGATIVE — SIGNIFICANT CHANGE UP
NITRITE UR-MCNC: NEGATIVE — SIGNIFICANT CHANGE UP
NRBC # BLD: 0 /100 WBCS — SIGNIFICANT CHANGE UP (ref 0–0)
NRBC # BLD: 0 /100 WBCS — SIGNIFICANT CHANGE UP (ref 0–0)
NRBC # FLD: 0 K/UL — SIGNIFICANT CHANGE UP (ref 0–0)
NRBC # FLD: 0 K/UL — SIGNIFICANT CHANGE UP (ref 0–0)
OPIATES UR-MCNC: NEGATIVE — SIGNIFICANT CHANGE UP
OXYCODONE UR-MCNC: NEGATIVE — SIGNIFICANT CHANGE UP
PCO2 BLDV: 34 MMHG — LOW (ref 42–55)
PCO2 BLDV: 37 MMHG — LOW (ref 42–55)
PCO2 BLDV: 40 MMHG — LOW (ref 42–55)
PCP SPEC-MCNC: SIGNIFICANT CHANGE UP
PCP UR-MCNC: NEGATIVE — SIGNIFICANT CHANGE UP
PH BLDV: 7.32 — SIGNIFICANT CHANGE UP (ref 7.32–7.43)
PH BLDV: 7.34 — SIGNIFICANT CHANGE UP (ref 7.32–7.43)
PH BLDV: 7.37 — SIGNIFICANT CHANGE UP (ref 7.32–7.43)
PH UR: 6 — SIGNIFICANT CHANGE UP (ref 5–8)
PH UR: 6 — SIGNIFICANT CHANGE UP (ref 5–8)
PHOSPHATE SERPL-MCNC: 2.8 MG/DL — SIGNIFICANT CHANGE UP (ref 2.5–4.5)
PHOSPHATE SERPL-MCNC: 3.9 MG/DL — SIGNIFICANT CHANGE UP (ref 2.5–4.5)
PLATELET # BLD AUTO: 247 K/UL — SIGNIFICANT CHANGE UP (ref 150–400)
PLATELET # BLD AUTO: 264 K/UL — SIGNIFICANT CHANGE UP (ref 150–400)
PO2 BLDV: 31 MMHG — SIGNIFICANT CHANGE UP (ref 25–45)
PO2 BLDV: 51 MMHG — HIGH (ref 25–45)
PO2 BLDV: 54 MMHG — HIGH (ref 25–45)
POTASSIUM BLDV-SCNC: 4 MMOL/L — SIGNIFICANT CHANGE UP (ref 3.5–5.1)
POTASSIUM BLDV-SCNC: 4.3 MMOL/L — SIGNIFICANT CHANGE UP (ref 3.5–5.1)
POTASSIUM BLDV-SCNC: 4.4 MMOL/L — SIGNIFICANT CHANGE UP (ref 3.5–5.1)
POTASSIUM SERPL-MCNC: 4 MMOL/L — SIGNIFICANT CHANGE UP (ref 3.5–5.3)
POTASSIUM SERPL-MCNC: 4.1 MMOL/L — SIGNIFICANT CHANGE UP (ref 3.5–5.3)
POTASSIUM SERPL-MCNC: 4.2 MMOL/L — SIGNIFICANT CHANGE UP (ref 3.5–5.3)
POTASSIUM SERPL-SCNC: 4 MMOL/L — SIGNIFICANT CHANGE UP (ref 3.5–5.3)
POTASSIUM SERPL-SCNC: 4.1 MMOL/L — SIGNIFICANT CHANGE UP (ref 3.5–5.3)
POTASSIUM SERPL-SCNC: 4.2 MMOL/L — SIGNIFICANT CHANGE UP (ref 3.5–5.3)
POTASSIUM UR-SCNC: 37.3 MMOL/L — SIGNIFICANT CHANGE UP
PROT SERPL-MCNC: 6.6 G/DL — SIGNIFICANT CHANGE UP (ref 6–8.3)
PROT SERPL-MCNC: 7.2 G/DL — SIGNIFICANT CHANGE UP (ref 6–8.3)
PROT UR-MCNC: 30 MG/DL
PROT UR-MCNC: NEGATIVE MG/DL — SIGNIFICANT CHANGE UP
RBC # BLD: 4.55 M/UL — SIGNIFICANT CHANGE UP (ref 4.2–5.8)
RBC # BLD: 4.79 M/UL — SIGNIFICANT CHANGE UP (ref 4.2–5.8)
RBC # FLD: 11.9 % — SIGNIFICANT CHANGE UP (ref 10.3–14.5)
RBC # FLD: 11.9 % — SIGNIFICANT CHANGE UP (ref 10.3–14.5)
RBC CASTS # UR COMP ASSIST: 0 /HPF — SIGNIFICANT CHANGE UP (ref 0–4)
RBC CASTS # UR COMP ASSIST: 2 /HPF — SIGNIFICANT CHANGE UP (ref 0–4)
RSV RNA NPH QL NAA+NON-PROBE: SIGNIFICANT CHANGE UP
SAO2 % BLDV: 46.5 % — LOW (ref 67–88)
SAO2 % BLDV: 80.4 % — SIGNIFICANT CHANGE UP (ref 67–88)
SAO2 % BLDV: 82.6 % — SIGNIFICANT CHANGE UP (ref 67–88)
SARS-COV-2 RNA SPEC QL NAA+PROBE: SIGNIFICANT CHANGE UP
SODIUM SERPL-SCNC: 136 MMOL/L — SIGNIFICANT CHANGE UP (ref 135–145)
SODIUM SERPL-SCNC: 137 MMOL/L — SIGNIFICANT CHANGE UP (ref 135–145)
SODIUM SERPL-SCNC: 137 MMOL/L — SIGNIFICANT CHANGE UP (ref 135–145)
SODIUM UR-SCNC: 57 MMOL/L — SIGNIFICANT CHANGE UP
SP GR SPEC: 1.01 — SIGNIFICANT CHANGE UP (ref 1–1.03)
SP GR SPEC: 1.03 — SIGNIFICANT CHANGE UP (ref 1–1.03)
SQUAMOUS # UR AUTO: 0 /HPF — SIGNIFICANT CHANGE UP (ref 0–5)
SQUAMOUS # UR AUTO: 1 /HPF — SIGNIFICANT CHANGE UP (ref 0–5)
THC UR QL: NEGATIVE — SIGNIFICANT CHANGE UP
URATE SERPL-MCNC: 9.7 MG/DL — HIGH (ref 3.4–8.8)
UROBILINOGEN FLD QL: 0.2 MG/DL — SIGNIFICANT CHANGE UP (ref 0.2–1)
UROBILINOGEN FLD QL: 1 MG/DL — SIGNIFICANT CHANGE UP (ref 0.2–1)
WBC # BLD: 10.09 K/UL — SIGNIFICANT CHANGE UP (ref 3.8–10.5)
WBC # BLD: 7.29 K/UL — SIGNIFICANT CHANGE UP (ref 3.8–10.5)
WBC # FLD AUTO: 10.09 K/UL — SIGNIFICANT CHANGE UP (ref 3.8–10.5)
WBC # FLD AUTO: 7.29 K/UL — SIGNIFICANT CHANGE UP (ref 3.8–10.5)
WBC UR QL: 1 /HPF — SIGNIFICANT CHANGE UP (ref 0–5)
WBC UR QL: 2 /HPF — SIGNIFICANT CHANGE UP (ref 0–5)

## 2024-06-19 PROCEDURE — 99223 1ST HOSP IP/OBS HIGH 75: CPT

## 2024-06-19 PROCEDURE — 71046 X-RAY EXAM CHEST 2 VIEWS: CPT | Mod: 26

## 2024-06-19 PROCEDURE — 70450 CT HEAD/BRAIN W/O DYE: CPT | Mod: 26,MC

## 2024-06-19 PROCEDURE — 74177 CT ABD & PELVIS W/CONTRAST: CPT | Mod: 26,MC

## 2024-06-19 RX ORDER — LORAZEPAM 0.5 MG
1.5 TABLET ORAL EVERY 4 HOURS
Refills: 0 | Status: DISCONTINUED | OUTPATIENT
Start: 2024-06-20 | End: 2024-06-20

## 2024-06-19 RX ORDER — SODIUM CHLORIDE 0.9 % (FLUSH) 0.9 %
1000 SYRINGE (ML) INJECTION
Refills: 0 | Status: DISCONTINUED | OUTPATIENT
Start: 2024-06-19 | End: 2024-06-20

## 2024-06-19 RX ORDER — LORAZEPAM 0.5 MG
TABLET ORAL
Refills: 0 | Status: DISCONTINUED | OUTPATIENT
Start: 2024-06-20 | End: 2024-06-20

## 2024-06-19 RX ORDER — THIAMINE HCL 100 MG
100 TABLET ORAL ONCE
Refills: 0 | Status: COMPLETED | OUTPATIENT
Start: 2024-06-19 | End: 2024-06-19

## 2024-06-19 RX ORDER — FOLIC ACID
1 POWDER (GRAM) MISCELLANEOUS ONCE
Refills: 0 | Status: COMPLETED | OUTPATIENT
Start: 2024-06-19 | End: 2024-06-19

## 2024-06-19 RX ORDER — THIAMINE HCL 100 MG
100 TABLET ORAL EVERY 24 HOURS
Refills: 0 | Status: DISCONTINUED | OUTPATIENT
Start: 2024-06-20 | End: 2024-06-21

## 2024-06-19 RX ORDER — FLUTICASONE FUROATE, UMECLIDINIUM BROMIDE AND VILANTEROL TRIFENATATE 200; 62.5; 25 UG/1; UG/1; UG/1
1 POWDER RESPIRATORY (INHALATION)
Refills: 0 | DISCHARGE

## 2024-06-19 RX ORDER — CALCIUM GLUCONATE 98 MG/ML
2 INJECTION, SOLUTION INTRAVENOUS ONCE
Refills: 0 | Status: COMPLETED | OUTPATIENT
Start: 2024-06-19 | End: 2024-06-21

## 2024-06-19 RX ORDER — LORAZEPAM 0.5 MG
2 TABLET ORAL EVERY 4 HOURS
Refills: 0 | Status: DISCONTINUED | OUTPATIENT
Start: 2024-06-19 | End: 2024-06-20

## 2024-06-19 RX ORDER — BUDESONIDE/FORMOTEROL FUMARATE 160-4.5MCG
2 HFA AEROSOL WITH ADAPTER (GRAM) INHALATION
Refills: 0 | Status: DISCONTINUED | OUTPATIENT
Start: 2024-06-19 | End: 2024-06-21

## 2024-06-19 RX ORDER — COLCHICINE 0.6 MG
1 TABLET ORAL
Qty: 0 | Refills: 0 | DISCHARGE

## 2024-06-19 RX ORDER — LISINOPRIL 2.5 MG/1
1 TABLET ORAL
Qty: 0 | Refills: 0 | DISCHARGE

## 2024-06-19 RX ORDER — DEXTROSE MONOHYDRATE AND SODIUM CHLORIDE 5; .3 G/100ML; G/100ML
1000 INJECTION, SOLUTION INTRAVENOUS
Refills: 0 | Status: DISCONTINUED | OUTPATIENT
Start: 2024-06-19 | End: 2024-06-19

## 2024-06-19 RX ORDER — MONTELUKAST 4 MG/1
1 TABLET, CHEWABLE ORAL
Qty: 0 | Refills: 0 | DISCHARGE

## 2024-06-19 RX ORDER — FOLIC ACID
1 POWDER (GRAM) MISCELLANEOUS DAILY
Refills: 0 | Status: DISCONTINUED | OUTPATIENT
Start: 2024-06-19 | End: 2024-06-21

## 2024-06-19 RX ORDER — LORAZEPAM 0.5 MG
2 TABLET ORAL
Refills: 0 | Status: DISCONTINUED | OUTPATIENT
Start: 2024-06-19 | End: 2024-06-21

## 2024-06-19 RX ORDER — AMLODIPINE BESYLATE AND OLMESARTRAN MEDOXOMIL 10; 40 MG/1; MG/1
1 TABLET, FILM COATED ORAL
Refills: 0 | DISCHARGE

## 2024-06-19 RX ORDER — LORAZEPAM 0.5 MG
1 TABLET ORAL ONCE
Refills: 0 | Status: DISCONTINUED | OUTPATIENT
Start: 2024-06-19 | End: 2024-06-19

## 2024-06-19 RX ORDER — HEPARIN SODIUM 50 [USP'U]/ML
5000 INJECTION, SOLUTION INTRAVENOUS EVERY 8 HOURS
Refills: 0 | Status: DISCONTINUED | OUTPATIENT
Start: 2024-06-19 | End: 2024-06-21

## 2024-06-19 RX ORDER — ALBUTEROL 90 MCG
2 AEROSOL REFILL (GRAM) INHALATION EVERY 6 HOURS
Refills: 0 | Status: DISCONTINUED | OUTPATIENT
Start: 2024-06-19 | End: 2024-06-21

## 2024-06-19 RX ADMIN — HEPARIN SODIUM 5000 UNIT(S): 50 INJECTION, SOLUTION INTRAVENOUS at 21:25

## 2024-06-19 RX ADMIN — Medication 2000 MILLILITER(S): at 00:15

## 2024-06-19 RX ADMIN — Medication 2 MILLIGRAM(S): at 16:28

## 2024-06-19 RX ADMIN — Medication 2000 MILLILITER(S): at 01:32

## 2024-06-19 RX ADMIN — Medication 100 MILLIGRAM(S): at 01:02

## 2024-06-19 RX ADMIN — Medication 2 PUFF(S): at 21:25

## 2024-06-19 RX ADMIN — Medication 1 MILLIGRAM(S): at 01:02

## 2024-06-19 RX ADMIN — Medication 1 MILLIGRAM(S): at 12:07

## 2024-06-19 RX ADMIN — Medication 100 MILLIGRAM(S): at 07:36

## 2024-06-19 RX ADMIN — Medication 2 MILLIGRAM(S): at 21:26

## 2024-06-19 RX ADMIN — Medication 75 MILLILITER(S): at 16:29

## 2024-06-20 DIAGNOSIS — K22.89 OTHER SPECIFIED DISEASE OF ESOPHAGUS: ICD-10-CM

## 2024-06-20 LAB
ANION GAP SERPL CALC-SCNC: 11 MMOL/L — SIGNIFICANT CHANGE UP (ref 7–14)
BUN SERPL-MCNC: 9 MG/DL — SIGNIFICANT CHANGE UP (ref 7–23)
CALCIUM SERPL-MCNC: 8 MG/DL — LOW (ref 8.4–10.5)
CHLORIDE SERPL-SCNC: 103 MMOL/L — SIGNIFICANT CHANGE UP (ref 98–107)
CO2 SERPL-SCNC: 21 MMOL/L — LOW (ref 22–31)
CREAT SERPL-MCNC: 0.82 MG/DL — SIGNIFICANT CHANGE UP (ref 0.5–1.3)
CULTURE RESULTS: NO GROWTH — SIGNIFICANT CHANGE UP
EGFR: 100 ML/MIN/1.73M2 — SIGNIFICANT CHANGE UP
GLUCOSE SERPL-MCNC: 133 MG/DL — HIGH (ref 70–99)
HCT VFR BLD CALC: 38 % — LOW (ref 39–50)
HGB BLD-MCNC: 13.1 G/DL — SIGNIFICANT CHANGE UP (ref 13–17)
MAGNESIUM SERPL-MCNC: 1.7 MG/DL — SIGNIFICANT CHANGE UP (ref 1.6–2.6)
MCHC RBC-ENTMCNC: 29.5 PG — SIGNIFICANT CHANGE UP (ref 27–34)
MCHC RBC-ENTMCNC: 34.5 GM/DL — SIGNIFICANT CHANGE UP (ref 32–36)
MCV RBC AUTO: 85.6 FL — SIGNIFICANT CHANGE UP (ref 80–100)
NRBC # BLD: 0 /100 WBCS — SIGNIFICANT CHANGE UP (ref 0–0)
NRBC # FLD: 0 K/UL — SIGNIFICANT CHANGE UP (ref 0–0)
PHOSPHATE SERPL-MCNC: 2.3 MG/DL — LOW (ref 2.5–4.5)
PLATELET # BLD AUTO: 191 K/UL — SIGNIFICANT CHANGE UP (ref 150–400)
POTASSIUM SERPL-MCNC: 3.7 MMOL/L — SIGNIFICANT CHANGE UP (ref 3.5–5.3)
POTASSIUM SERPL-SCNC: 3.7 MMOL/L — SIGNIFICANT CHANGE UP (ref 3.5–5.3)
RBC # BLD: 4.44 M/UL — SIGNIFICANT CHANGE UP (ref 4.2–5.8)
RBC # FLD: 11.9 % — SIGNIFICANT CHANGE UP (ref 10.3–14.5)
SODIUM SERPL-SCNC: 135 MMOL/L — SIGNIFICANT CHANGE UP (ref 135–145)
SPECIMEN SOURCE: SIGNIFICANT CHANGE UP
WBC # BLD: 6.79 K/UL — SIGNIFICANT CHANGE UP (ref 3.8–10.5)
WBC # FLD AUTO: 6.79 K/UL — SIGNIFICANT CHANGE UP (ref 3.8–10.5)

## 2024-06-20 PROCEDURE — 99233 SBSQ HOSP IP/OBS HIGH 50: CPT

## 2024-06-20 RX ORDER — SOD PHOS DI, MONO/K PHOS MONO 250 MG
2 TABLET ORAL ONCE
Refills: 0 | Status: COMPLETED | OUTPATIENT
Start: 2024-06-20 | End: 2024-06-20

## 2024-06-20 RX ORDER — PANTOPRAZOLE SODIUM 40 MG/10ML
40 INJECTION, POWDER, FOR SOLUTION INTRAVENOUS
Refills: 0 | Status: DISCONTINUED | OUTPATIENT
Start: 2024-06-20 | End: 2024-06-21

## 2024-06-20 RX ORDER — AMLODIPINE BESYLATE 2.5 MG/1
5 TABLET ORAL DAILY
Refills: 0 | Status: DISCONTINUED | OUTPATIENT
Start: 2024-06-20 | End: 2024-06-20

## 2024-06-20 RX ADMIN — Medication 2 PUFF(S): at 09:35

## 2024-06-20 RX ADMIN — HEPARIN SODIUM 5000 UNIT(S): 50 INJECTION, SOLUTION INTRAVENOUS at 14:13

## 2024-06-20 RX ADMIN — Medication 1 TABLET(S): at 12:42

## 2024-06-20 RX ADMIN — Medication 75 MILLILITER(S): at 06:01

## 2024-06-20 RX ADMIN — Medication 2 MILLIGRAM(S): at 05:40

## 2024-06-20 RX ADMIN — Medication 2 MILLIGRAM(S): at 01:31

## 2024-06-20 RX ADMIN — Medication 2 PUFF(S): at 21:22

## 2024-06-20 RX ADMIN — HEPARIN SODIUM 5000 UNIT(S): 50 INJECTION, SOLUTION INTRAVENOUS at 05:38

## 2024-06-20 RX ADMIN — HEPARIN SODIUM 5000 UNIT(S): 50 INJECTION, SOLUTION INTRAVENOUS at 21:22

## 2024-06-20 RX ADMIN — Medication 2 MILLIGRAM(S): at 10:32

## 2024-06-20 RX ADMIN — AMLODIPINE BESYLATE 5 MILLIGRAM(S): 2.5 TABLET ORAL at 12:42

## 2024-06-20 RX ADMIN — Medication 2 PACKET(S): at 12:43

## 2024-06-20 RX ADMIN — Medication 2 MILLIGRAM(S): at 14:13

## 2024-06-20 RX ADMIN — Medication 100 MILLIGRAM(S): at 05:39

## 2024-06-20 RX ADMIN — Medication 1 MILLIGRAM(S): at 12:42

## 2024-06-21 ENCOUNTER — TRANSCRIPTION ENCOUNTER (OUTPATIENT)
Age: 64
End: 2024-06-21

## 2024-06-21 VITALS
SYSTOLIC BLOOD PRESSURE: 157 MMHG | RESPIRATION RATE: 16 BRPM | OXYGEN SATURATION: 99 % | TEMPERATURE: 98 F | HEART RATE: 70 BPM | DIASTOLIC BLOOD PRESSURE: 90 MMHG

## 2024-06-21 LAB
A1C WITH ESTIMATED AVERAGE GLUCOSE RESULT: 6.7 % — HIGH (ref 4–5.6)
ANION GAP SERPL CALC-SCNC: 12 MMOL/L — SIGNIFICANT CHANGE UP (ref 7–14)
BUN SERPL-MCNC: 6 MG/DL — LOW (ref 7–23)
CALCIUM SERPL-MCNC: 9.5 MG/DL — SIGNIFICANT CHANGE UP (ref 8.4–10.5)
CHLORIDE SERPL-SCNC: 100 MMOL/L — SIGNIFICANT CHANGE UP (ref 98–107)
CHOLEST SERPL-MCNC: 170 MG/DL — SIGNIFICANT CHANGE UP
CO2 SERPL-SCNC: 24 MMOL/L — SIGNIFICANT CHANGE UP (ref 22–31)
CREAT SERPL-MCNC: 0.71 MG/DL — SIGNIFICANT CHANGE UP (ref 0.5–1.3)
EGFR: 104 ML/MIN/1.73M2 — SIGNIFICANT CHANGE UP
ESTIMATED AVERAGE GLUCOSE: 146 — SIGNIFICANT CHANGE UP
GLUCOSE SERPL-MCNC: 162 MG/DL — HIGH (ref 70–99)
HCT VFR BLD CALC: 38.4 % — LOW (ref 39–50)
HDLC SERPL-MCNC: 48 MG/DL — SIGNIFICANT CHANGE UP
HGB BLD-MCNC: 12.9 G/DL — LOW (ref 13–17)
LIPID PNL WITH DIRECT LDL SERPL: 84 MG/DL — SIGNIFICANT CHANGE UP
MAGNESIUM SERPL-MCNC: 1.4 MG/DL — LOW (ref 1.6–2.6)
MCHC RBC-ENTMCNC: 29.5 PG — SIGNIFICANT CHANGE UP (ref 27–34)
MCHC RBC-ENTMCNC: 33.6 GM/DL — SIGNIFICANT CHANGE UP (ref 32–36)
MCV RBC AUTO: 87.7 FL — SIGNIFICANT CHANGE UP (ref 80–100)
NON HDL CHOLESTEROL: 122 MG/DL — SIGNIFICANT CHANGE UP
NRBC # BLD: 0 /100 WBCS — SIGNIFICANT CHANGE UP (ref 0–0)
NRBC # FLD: 0 K/UL — SIGNIFICANT CHANGE UP (ref 0–0)
PHOSPHATE SERPL-MCNC: 4 MG/DL — SIGNIFICANT CHANGE UP (ref 2.5–4.5)
PLATELET # BLD AUTO: 200 K/UL — SIGNIFICANT CHANGE UP (ref 150–400)
POTASSIUM SERPL-MCNC: 4 MMOL/L — SIGNIFICANT CHANGE UP (ref 3.5–5.3)
POTASSIUM SERPL-SCNC: 4 MMOL/L — SIGNIFICANT CHANGE UP (ref 3.5–5.3)
RBC # BLD: 4.38 M/UL — SIGNIFICANT CHANGE UP (ref 4.2–5.8)
RBC # FLD: 11.8 % — SIGNIFICANT CHANGE UP (ref 10.3–14.5)
SODIUM SERPL-SCNC: 136 MMOL/L — SIGNIFICANT CHANGE UP (ref 135–145)
TRIGL SERPL-MCNC: 189 MG/DL — HIGH
WBC # BLD: 6.27 K/UL — SIGNIFICANT CHANGE UP (ref 3.8–10.5)
WBC # FLD AUTO: 6.27 K/UL — SIGNIFICANT CHANGE UP (ref 3.8–10.5)

## 2024-06-21 PROCEDURE — 99239 HOSP IP/OBS DSCHRG MGMT >30: CPT

## 2024-06-21 PROCEDURE — 99233 SBSQ HOSP IP/OBS HIGH 50: CPT | Mod: GC

## 2024-06-21 RX ORDER — FOLIC ACID
1 POWDER (GRAM) MISCELLANEOUS
Qty: 0 | Refills: 0 | DISCHARGE
Start: 2024-06-21

## 2024-06-21 RX ORDER — MAGNESIUM SULFATE 100 %
1 POWDER (GRAM) MISCELLANEOUS ONCE
Refills: 0 | Status: COMPLETED | OUTPATIENT
Start: 2024-06-21 | End: 2024-06-21

## 2024-06-21 RX ADMIN — HEPARIN SODIUM 5000 UNIT(S): 50 INJECTION, SOLUTION INTRAVENOUS at 05:28

## 2024-06-21 RX ADMIN — Medication 1 MILLIGRAM(S): at 12:07

## 2024-06-21 RX ADMIN — Medication 100 MILLIGRAM(S): at 05:29

## 2024-06-21 RX ADMIN — Medication 100 GRAM(S): at 12:03

## 2024-06-21 RX ADMIN — Medication 1 TABLET(S): at 12:07

## 2024-06-21 RX ADMIN — Medication 2 PUFF(S): at 09:32

## 2024-06-21 RX ADMIN — PANTOPRAZOLE SODIUM 40 MILLIGRAM(S): 40 INJECTION, POWDER, FOR SOLUTION INTRAVENOUS at 05:29

## 2024-06-21 RX ADMIN — CALCIUM GLUCONATE 200 GRAM(S): 98 INJECTION, SOLUTION INTRAVENOUS at 13:48

## 2024-06-21 RX ADMIN — HEPARIN SODIUM 5000 UNIT(S): 50 INJECTION, SOLUTION INTRAVENOUS at 13:48

## 2024-06-24 LAB
CULTURE RESULTS: SIGNIFICANT CHANGE UP
CULTURE RESULTS: SIGNIFICANT CHANGE UP
SPECIMEN SOURCE: SIGNIFICANT CHANGE UP
SPECIMEN SOURCE: SIGNIFICANT CHANGE UP

## 2024-06-26 ENCOUNTER — NON-APPOINTMENT (OUTPATIENT)
Age: 64
End: 2024-06-26

## 2024-06-27 ENCOUNTER — NON-APPOINTMENT (OUTPATIENT)
Age: 64
End: 2024-06-27

## 2024-07-08 ENCOUNTER — NON-APPOINTMENT (OUTPATIENT)
Age: 64
End: 2024-07-08

## 2024-07-08 ENCOUNTER — APPOINTMENT (OUTPATIENT)
Dept: GASTROENTEROLOGY | Facility: CLINIC | Age: 64
End: 2024-07-08

## 2024-07-11 ENCOUNTER — NON-APPOINTMENT (OUTPATIENT)
Age: 64
End: 2024-07-11

## 2024-08-06 ENCOUNTER — APPOINTMENT (OUTPATIENT)
Dept: SURGICAL ONCOLOGY | Facility: CLINIC | Age: 64
End: 2024-08-06

## 2024-08-06 PROBLEM — Z87.891 FORMER SMOKER: Status: ACTIVE | Noted: 2024-08-06

## 2024-08-06 PROBLEM — Z83.79 FAMILY HISTORY OF HEPATIC CIRRHOSIS: Status: ACTIVE | Noted: 2024-08-06

## 2024-08-06 PROBLEM — K63.89 MESENTERIC MASS: Status: ACTIVE | Noted: 2024-08-06

## 2024-08-06 PROBLEM — Z63.4 WIDOW: Status: ACTIVE | Noted: 2024-08-06

## 2024-08-06 PROCEDURE — 99203 OFFICE O/P NEW LOW 30 MIN: CPT

## 2024-08-13 NOTE — REASON FOR VISIT
Initiate Treatment: mometasone 0.1 % topical solution \\nQuantity: 45.0 g\\nSig: To the flares on the scalp, face twice daily x 2 weeks. Use as needed for flares. Render In Strict Bullet Format?: No Detail Level: Zone Modify Regimen: - Increase ketoconazole shampoo use to TIW. Lather onto scalp, let sit on scalp, then rinse. Follow with regular shampoo and conditioner as preferred [Initial Consultation] : an initial consultation for [FreeTextEntry2] :  MESENTERIC MASS BY DR LANGFORD

## 2024-08-13 NOTE — CONSULT LETTER
[Dear  ___] : Dear  [unfilled], [Courtesy Letter:] : I had the pleasure of seeing your patient, [unfilled], in my office today. [Please see my note below.] : Please see my note below. [Consult Closing:] : Thank you very much for allowing me to participate in the care of this patient.  If you have any questions, please do not hesitate to contact me. [Sincerely,] : Sincerely, [DrRj  ___] : Dr. VASQUEZ [FreeTextEntry3] : Sj Sandoval MD, WERNER, FACS Director of Surgical Oncology- [facility] , Department of Surgery [facility] at [place] [id number] Ph: [phone] Cell: [phone]

## 2024-08-13 NOTE — CONSULT LETTER
[Dear  ___] : Dear  [unfilled], [Courtesy Letter:] : I had the pleasure of seeing your patient, [unfilled], in my office today. [Please see my note below.] : Please see my note below. [Consult Closing:] : Thank you very much for allowing me to participate in the care of this patient.  If you have any questions, please do not hesitate to contact me. [Sincerely,] : Sincerely, [DrRj  ___] : Dr. VASQUEZ [FreeTextEntry3] : Sj Sandoval MD, WERNER, FACS Director of Surgical Oncology- [facility] , Department of Surgery [facility] at [place] [id number] Ph: [phone] Cell: [phone]

## 2024-08-13 NOTE — ASSESSMENT
[FreeTextEntry1] : IMP: 62y/o M referred by Dr. Ramos for mesenteric mass.  CT abd0 mild mesenteric haziness with prominent lymphadenopathy No B symptoms Pt is asymptomatic   PLAN:  - CT A/P for surveillance of mesenteric lymphadenopathy- could be reactive - RTO TEB 3 months  I have discussed the diagnosis, therapeutic plan and options with the patient at length. Patient expressed verbal understanding to proceed with proposed plan. All questions answered.

## 2024-08-13 NOTE — REASON FOR VISIT
[Initial Consultation] : an initial consultation for [FreeTextEntry2] :  MESENTERIC MASS BY DR LANGFORD

## 2024-08-13 NOTE — PHYSICAL EXAM
[Normal] : supple, no neck mass and thyroid not enlarged [Normal Neck Lymph Nodes] : normal neck lymph nodes  [Normal Supraclavicular Lymph Nodes] : normal supraclavicular lymph nodes [Normal Groin Lymph Nodes] : normal groin lymph nodes [Normal Axillary Lymph Nodes] : normal axillary lymph nodes [Normal] : oriented to person, place and time, with appropriate affect [FreeTextEntry1] :  COVID -19 precautions as per [facility] policy was universally followed. [de-identified] :  Abdomen soft, non-tender, non-distended, and no palpable masses.

## 2024-08-13 NOTE — CONSULT LETTER
[Dear  ___] : Dear  [unfilled], [Courtesy Letter:] : I had the pleasure of seeing your patient, [unfilled], in my office today. [Please see my note below.] : Please see my note below. [Consult Closing:] : Thank you very much for allowing me to participate in the care of this patient.  If you have any questions, please do not hesitate to contact me. [Sincerely,] : Sincerely, [DrRj  ___] : Dr. VASQUEZ [FreeTextEntry3] : Sj Sandoval MD, WERNER, FACS Director of Surgical Oncology- [facility] , Department of Surgery [facility] at [place] [id number] Ph: [phone] Cell: [phone]

## 2024-08-13 NOTE — HISTORY OF PRESENT ILLNESS
[de-identified] : Mr. DRAKE MILLER is a 64 y/o M, referred by DR LANGFORD for MESENTERIC MASS. Patient presented to [facility] 2024 s/p fall, noted as intoxicated at the time. CT A/P during admission showed continued mild mesenteric haziness with cluster of prominent mesenteric lymph nodes. Patient denies fever, chills, night sweats, N/V/D, unintentional weight loss.  Last: Endoscopy - yrs ago Colonoscopy - never   PMHX: Asthma, HTN,  PSH: -  Social: Smoking quit ~15yrs ago, illicit drug use, reports half a glass of wine or 1 bottle of beer a day FHx: mother- age 56 - breast:    ECO  PCP: Dr. Porter. Manuel
[de-identified] : Mr. DRAKE MILLER is a 64 y/o M, referred by DR LANGFORD for MESENTERIC MASS. Patient [facility] s/p fall, noted as intoxicated at the time. CT A/P during admission showed continued mild mesenteric haziness with cluster of prominent mesenteric lymph nodes. Patient denies fever, chills, night sweats, N/V/D, unintentional weight loss.  Last: Endoscopy - yrs ago Colonoscopy - never   PMHX: Asthma, HTN,  PSH: -  Social: Smoking quit ~15yrs ago, illicit drug use, reports half a glass of wine or 1 bottle of beer a day FHx: mother- age 56 - breast:    ECO  PCP: Dr. Porter. Manuel
[de-identified] : Mr. DRAKE MILLER is a 62 y/o M, referred by DR LANGFORD for MESENTERIC MASS. Patient presented to [facility] 2024 s/p fall, noted as intoxicated at the time. CT A/P during admission showed continued mild mesenteric haziness with cluster of prominent mesenteric lymph nodes. Patient denies fever, chills, night sweats, N/V/D, unintentional weight loss.  Last: Endoscopy - yrs ago Colonoscopy - never   PMHX: Asthma, HTN,  PSH: -  Social: Smoking quit ~15yrs ago, illicit drug use, reports half a glass of wine or 1 bottle of beer a day FHx: mother- age 56 - breast:    ECO  PCP: Dr. Porter. Manuel
2 = A lot of assistance

## 2024-08-13 NOTE — PHYSICAL EXAM
[Normal] : supple, no neck mass and thyroid not enlarged [Normal Neck Lymph Nodes] : normal neck lymph nodes  [Normal Supraclavicular Lymph Nodes] : normal supraclavicular lymph nodes [Normal Groin Lymph Nodes] : normal groin lymph nodes [Normal Axillary Lymph Nodes] : normal axillary lymph nodes [Normal] : oriented to person, place and time, with appropriate affect [FreeTextEntry1] :  COVID -19 precautions as per [facility] policy was universally followed. [de-identified] :  Abdomen soft, non-tender, non-distended, and no palpable masses.

## 2024-08-13 NOTE — PHYSICAL EXAM
[Normal] : supple, no neck mass and thyroid not enlarged [Normal Neck Lymph Nodes] : normal neck lymph nodes  [Normal Supraclavicular Lymph Nodes] : normal supraclavicular lymph nodes [Normal Groin Lymph Nodes] : normal groin lymph nodes [Normal Axillary Lymph Nodes] : normal axillary lymph nodes [Normal] : oriented to person, place and time, with appropriate affect [FreeTextEntry1] :  COVID -19 precautions as per [facility] policy was universally followed. [de-identified] :  Abdomen soft, non-tender, non-distended, and no palpable masses.

## 2024-08-13 NOTE — ASSESSMENT
[FreeTextEntry1] : IMP: 64y/o M referred by Dr. Ramos for mesenteric mass.  CT abd0 mild mesenteric haziness with prominent lymphadenopathy No B symptoms Pt is asymptomatic   PLAN:  - CT A/P for surveillance of mesenteric lymphadenopathy- could be reactive - RTO TEB 3 months  I have discussed the diagnosis, therapeutic plan and options with the patient at length. Patient expressed verbal understanding to proceed with proposed plan. All questions answered.

## 2024-11-09 ENCOUNTER — APPOINTMENT (OUTPATIENT)
Dept: CT IMAGING | Facility: IMAGING CENTER | Age: 64
End: 2024-11-09

## 2024-11-09 ENCOUNTER — OUTPATIENT (OUTPATIENT)
Dept: OUTPATIENT SERVICES | Facility: HOSPITAL | Age: 64
LOS: 1 days | End: 2024-11-09
Payer: COMMERCIAL

## 2024-11-09 DIAGNOSIS — K63.89 OTHER SPECIFIED DISEASES OF INTESTINE: ICD-10-CM

## 2024-11-09 PROCEDURE — 74177 CT ABD & PELVIS W/CONTRAST: CPT | Mod: 26

## 2024-11-09 PROCEDURE — 74177 CT ABD & PELVIS W/CONTRAST: CPT

## 2024-11-19 ENCOUNTER — APPOINTMENT (OUTPATIENT)
Dept: SURGICAL ONCOLOGY | Facility: CLINIC | Age: 64
End: 2024-11-19
Payer: COMMERCIAL

## 2024-11-19 DIAGNOSIS — K63.89 OTHER SPECIFIED DISEASES OF INTESTINE: ICD-10-CM

## 2024-11-19 PROCEDURE — 99214 OFFICE O/P EST MOD 30 MIN: CPT

## 2025-01-27 NOTE — ED ADULT TRIAGE NOTE - RESPIRATORY RATE (BREATHS/MIN)
Please return to the ER for any new or worsening symptoms  If prescribed, please be sure to  your prescriptions from the pharmacy  Please follow-up with Primary care provider as instructed        XR CHEST (2 VW)   Final Result   No acute process.      No significant interval change.              18

## 2025-02-25 NOTE — ED ADULT NURSE NOTE - NSFALLRSKINDICATORS_ED_ALL_ED
[place] [facility]    Patient: HONG STANFORD, 76 y.o.   : 1948  2025         Subjective     Chief Complaint   Patient presents with    New Patient     Pulmonary consult. Ref Bautista. SOB. Abnormal CTA 25.         Shortness of Breath  This is a chronic problem. Episode onset: very mild. The problem occurs rarely. The problem has been unchanged. Associated symptoms include leg swelling (lymphadema, in legs too). Pertinent negatives include no chest pain, fever, hemoptysis, rash, sputum production or wheezing. The symptoms are aggravated by exercise (really with extreme exertion.). Associated symptoms comments: Has had a nagging cough for two years. Dry. Staying the same. . The patient has no known risk factors (rarely smoked.) for DVT/PE. She has tried rest for the symptoms. The treatment provided moderate relief. Her past medical history is significant for allergies. There is no history of asthma, CAD, chronic lung disease, COPD, DVT, a heart failure, PE or pneumonia.   Cough  This is a chronic problem. The current episode started more than 1 year ago. The problem has been unchanged. The cough is Non-productive. Associated symptoms include shortness of breath and weight loss (but has been trying to lose weight). Pertinent negatives include no chest pain, chills, fever, heartburn, hemoptysis, rash or wheezing. Nothing aggravates the symptoms. Risk factors for lung disease include animal exposure (got cat last fall, no changes of symptoms.). Treatments tried: uses nasal sprays. There is no history of asthma, bronchiectasis, COPD or pneumonia. some arthritis thinks its osteo       Patient has not been admitted or treated for pneumonia, pulmonary embolism, or respiratory failure.  Patient has not been intubated for reasons other than planned procedures.      Social History  Patient job history included, worked at doctors office, office works.   Patient has not had exposure to aerosolized 
yes

## 2025-04-22 NOTE — ED PROVIDER NOTE - NSFOLLOWUPINSTRUCTIONS_ED_ALL_ED_FT
Physical Therapy Visit    Visit Type: Daily Treatment Note  Visit: 3  Referring Provider: Guanakito Cobb MD  Medical Diagnosis (from order): S83.251A - Bucket-handle tear of lateral meniscus of right knee as current injury, initial encounter  M25.561 - Acute pain of right knee     SUBJECTIVE                                                                                                               Patient verbally consented to allow observer present during session. (mother)    Knee pain fluctuates but overall is improving.  Had stitches removed.  Only taking prescription pain meds at night  Tolerating at least 60% weight bearing (with crutches).  Exercises going okay- doesn't do them every day because they are too painful.  Not using ice but does elevate.        OBJECTIVE                                                                                                                    Observation   Arrived using crutches and wearing brace, locked in extension. Accepting some weight through right Lower extremity.  Knee is edematous.  Steristrips in place.  No drainage.      Range of Motion (ROM)   (degrees unless noted; active unless noted; norms in ( ); negative=lacking to 0, positive=beyond 0)  Knee:   - Flexion (150):      • Right:   pain  Passive: 85                         Treatment    Neuromuscular Electrical Stimulation (NMES)  - Muscle Group(s): Right quadriceps in long sitting  Used [employer] Unit Pre-program NMES custom setting  - Pulse Rate: 50  - On (sec): 10 - Off (sec):  30  - Intensity: patient tolerance  - Activities performed with electrical stimulation: Attempted quadriceps sets- poor active contraction but able to activate with NMES  Reported posterior knee discomfort after this activity; note patient had declined to use a folded towel behind his knee  Duration: 15 minutes    Reaction: no adverse reaction to treatment      Therapeutic Exercise  Supported standing:  - Anterior posterior weight  shifting staggered stance- cues for quadriceps recruitment with right weight acceptance  - normal stance lateral weight shifting  - bilateral heel raises  - right hip abduction  - right hip extension    Heel slides with strap - 2 sets of 10 repetitions- cues to use straps to assist  Ankle pumps  Gluteal sets      Manual Therapy   -issued Size F and G (left wearing size F) single layer tubular bandage for light compression to be worn over right knee, distal thigh, proximal calf.  Instructed in purpose, wear schedule (on days/off nights), care (hand wash and drip dry) and precautions (remove if causing any increased pain, swelling, discoloration, numbness/tingling, temperature change etc.)      Gait Training  Training provided with crutches and locked brace on level surfaces- cues given for posture, step length and improved heel strike.  He reported ability to put 50-60% weight through his surgical leg.      Skilled input: verbal instruction/cues, tactile instruction/cues, demonstration and as detailed above    Writer verbally educated and received verbal consent for hand placement, positioning of patient, and techniques to be performed today from patient for clothing adjustments for techniques, hand placement and palpation for techniques and therapist position for techniques as described above and how they are pertinent to the patient's plan of care.  Home Exercise Program  glut sets  quad sets  straight leg raise  heel slides  seated knee extension  seated knee flexion  hanging knee extension  terminal knee extension  ankle pumps      ASSESSMENT                                                                                                            The patient tolerated the session fairly well.  He demonstrates gradual improvement in weight bearing and knee range of motion.  Edema persists; trialing light compression and encouraged ice/elevation.  Initiated use of NMES to try to facilitate quadriceps contraction  as patient has poor quadriceps recruitment.   Education:   - Results of above outlined education: Verbalizes understanding, Needs reinforcement and Demonstrates understanding    PLAN                                                                                                                           Suggestions for next session as indicated: Progress per plan of care, progress per post op guidelines.       Therapy procedure time and total treatment time can be found documented on the Time Entry flowsheet     Alcohol Intoxication      Alcohol intoxication occurs when a person no longer thinks clearly or functions well (becomes impaired) after drinking alcohol. Intoxication can occur with just one drink. The legal definition of alcohol intoxication depends on the amount of alcohol in the blood (blood alcohol concentration, NGHIA). NGHIA of 80–100 mg/dL or higher is commonly considered legally intoxicated. The level of impairment depends on:  •The amount of alcohol the person had.      •The person's age, gender, and weight.      •How often the person drinks.      •Whether the person has other medical conditions, such as diabetes, seizures, or a heart condition.      Alcohol intoxication can range from mild to severe. The condition can be dangerous, especially if the person:  •Also took certain drugs or prescription medicines.    •Drinks a large amount of alcohol in a short period of time (binge drinks).  •For women, binge drinking is having four or more drinks at one time.      •For men, binge drinking is having five or more drinks at one time.        If you or anyone around you appears intoxicated, speak up and act.      What are the causes?    This condition is caused by drinking alcohol.      What increases the risk?    The following factors may make you more likely to develop this condition:  •Peer pressure in young adults.      •Difficulty managing stress.      •History of drug or alcohol abuse.      •Combining alcohol with drugs.      •Family history of drug or alcohol abuse.      •Low body weight.      •Binge drinking.        What are the signs or symptoms?    Symptoms of alcohol intoxication can vary from person to person. Symptoms can be mild, moderate, or severe.    Symptoms of mild alcohol intoxication may include:  •Feeling relaxed or sleepy.      •Having mild difficulty with coordination, speech, memory, or attention.      Symptoms of moderate alcohol intoxication may include:  •Extreme emotions, like anger or sadness.      •Moderate difficulty with coordination, speech, memory, or attention.      Symptoms of severe alcohol intoxication may include:  •Severe difficulty with coordination, speech, memory, or attention.      •Passing out.      •Vomiting.      •Confusion.      •Slow breathing.      •Coma.      Intoxication can change quickly from mild to severe. It can cause coma or death, especially in people who are not exposed to alcohol often.      How is this diagnosed?    Your health care provider will ask you how much alcohol you drank and what kind you had. Intoxication may also be diagnosed based on:  •Your symptoms and medical history.      •A physical exam.      •A blood test that measures NGHIA.      •A smell of alcohol on your breath.        How is this treated?    Treatment for alcohol intoxication may include:  •Being monitored in an emergency department, hospital, or treatment center until your NGHIA comes down and it is safe for you to go home.      •IV fluids to prevent or treat loss of fluid in the body (dehydration).      •Medicine to treat nausea or vomiting or to get rid of alcohol in the body.      •Counseling (brief intervention) about the dangers of using alcohol.      •Treatment for substance use disorder.      •Oxygen therapy or a breathing machine (ventilator).      Long-term (chronic) exposure to alcohol can have long-term effects on your brain, heart, and gastrointestinal system. These effects can be serious and may also require treatment.      Follow these instructions at home:       Eating and drinking    • Do not drink alcohol if:  •Your health care provider tells you not to drink.      •You are pregnant, may be pregnant, or are planning to become pregnant.      •You are under the legal drinking age (21 years old in the [place]      •You are taking medicines that should not be taken with alcohol.      •You have a medical condition, and alcohol makes it worse.      •You need to drive or perform activities that require you to be alert.      •You have substance use disorder.      •Ask your health care provider if alcohol is safe for you. If your health care provider allows you to drink alcohol, limit how much you have. You may drink:  •0–1 drink a day for women.     • 0–2 drinks a day for men.   •Be aware of how much alcohol is in your drink. In the [place] one drink equals one 12 oz bottle of beer (355 mL), one 5 oz glass of wine (148 mL), or one 1½ oz shot of hard liquor (44 mL).          •Avoid drinking alcohol on an empty stomach.      •Stay hydrated. Drink enough fluid to keep your urine pale yellow. Avoid caffeine because it can dehydrate you.      •Avoid drinking more than one drink per hour.      •When having multiple drinks, drink water or a non-alcoholic beverage between alcoholic drinks.      General instructions     •Take over-the-counter and prescription medicines only as told by your health care provider.      • Do not drive after drinking any amount of alcohol. Plan for a designated  or another way to go home.      •Have someone responsible stay with you while you are intoxicated. You should not be left alone.      •Keep all follow-up visits as told by your health care provider. This is important.        Contact a health care provider if:    •You do not feel better after a few days.      •You have problems at work, at school, or at home due to drinking.        Get help right away if:  •You have any of the following:  •Moderate to severe trouble with coordination, speech, memory, or attention.      •Trouble staying awake.      •Severe confusion.      •A seizure.      •Light-headedness.      •Fainting.      •Vomiting bright red blood or material that looks like coffee grounds.      •Bloody stool (feces). The blood may make your stool bright red, black, or tarry. It may also smell bad.      •Shakiness when trying to stop drinking.      •Thoughts about hurting yourself or others.        If you ever feel like you may hurt yourself or others, or have thoughts about taking your own life, get help right away. You can go to your nearest emergency department or call:   • Your local emergency services (911 in the [place]       • A suicide crisis helpline, such as the National Suicide Prevention Lifeline at 1-525.621.7777. This is open 24 hours a day.         Summary    •Alcohol intoxication occurs when a person no longer thinks clearly or functions well after drinking alcohol.      •If your health care provider says that alcohol is safe for you, limit alcohol intake to no more than 1 drink a day for women (no drinks if you are pregnant) and 2 drinks a day for men. One drink equals 12 oz of beer, 5 oz of wine, or 1½ oz of hard liquor.      •Contact your health care provider if drinking has caused you problems at work, school, or home.      •Get help right away if you have thoughts about hurting yourself or others.      This information is not intended to replace advice given to you by your health care provider. Make sure you discuss any questions you have with your health care provider.

## 2025-06-19 NOTE — PROGRESS NOTE ADULT - PROBLEM SELECTOR PROBLEM 2
Subjective   Patient ID: Burke Talley is a 66 y.o. male who presents for Follow-up (Follow up ).    Patient is here for follow-up.  He did blood work  He has lost weight almost 35 pounds  Feeling a lot better  He is radha lose weight with his own lifestyle change  His left knee still hurts feels numbness on touching  Leg swelling is still present     Past recap   patient is here for follow-up on blood work  Swelling in the knee is coming down  Cellulitis left leg is doing better     Past recap  Patient is here for follow-up  He fell on the left knee and had bruising on the left knee now feeling little warm and red  He was at [employer] playing cards, drinking and eating, went to the bathroom and fell.  Denies syncopal episode but more of balance issue  Taking ashwagandha Gummies  Not using CPAP  Due for blood work for blood pressure and cholesterol    Past recap  Patient is here for ear cleaning on the other ear on the right side  Saw the foot doctor he is ordering MRI of the foot  Needs blood work for the routine blood work    Past recap  Patient presents with complaints of having left foot pain  He was wearing new golf shoes  Follow-up on hypertension high cholesterol A-fib  Due for blood work  Also having issues with hearing at some blood out of the right ear    Patient is here with complaints of blood in the urine.  Had blood in the urine last Wednesday for 1 time.  Then had blood in the urine for last 2 days..  Denies any abdominal pain     Past recap  Patient is here for follow-up on hypertension high cholesterol CHF  Due for blood work  Needs medication refill  Not using CPAP  Overall feeling good    Past recap   patient is here with complaints of having shortness of breath coughing bringing up yellow phlegm for past 1 week.       Patient is here for follow-up  Blood work  He is feeling much better his cough is doing better his shortness of breath is doing better  Water pill and can antibiotic helped  him.  He is not using his mouthpiece even for the CPAP.  Is refusing the CPAP meds  He still feels congested and then nose  He is concerned about arthritis in the hand    Patient is here with complaints of having shortness of breath he has increased swelling in the legs  Feels congested in the chest but not bringing up anything  He does not use CPAP machine uses mouthguard      Past recap  patient is here for follow-up  Follow-up on hypertension high cholesterol  Recently he is having more swelling in the legs  He had watchman's procedure for A. fib  Overall doing fine  Using mouthguard for obstructive sleep apnea, not using mouthpiece.  Did not get CPAP titration     Patient is here for follow-up  He had Covid in November he has recovered well  He had watchman's procedure for A. fib so he is not on blood thinners  He gained 10 pounds  Follow-up on hypertension high cholesterol     Patient was seen for hematuria. He had a CAT scan done which did not show any kidney stone. He did not take antibiotic. He was doing fine until this morning he noticed blood again. He was masturbating and wondering if that's causing complete. He definitely knows the blood is coming from the penis and not from the rectum.  Patient denies any fever or chills denies any abdominal pain     Patient had surgery done status post TA VR and watchman's procedure. Had surgery on February 11, 2020  Started on 5 mg of Coumadin on Wednesday patient is also on Xarelto   Plan is to stop Xarelto once INR between 2 and 3  Patient also has tape burns on the right wrist     Patient is here with complaints of having chest condition cough wheezing as 2 weeks  He was started on Augmentin on January 2 which he finished a few days ago   On Monday 6 he went for preop clearance for heart valve surgery   He had EKG chest x-ray done   But surgery is postponed because of his breathing   Patient is still coughing and wheezing still not better  Phlegm production has  improved     Patient had a cardiac catheter done through the right arm. Underwent TAVR  She had appointment with diet  still has some blurred vision on the periphery but these cleared for driving  Is going for sleep study today  He is here for follow-up on hypertension high cholesterol and CHF  Patient is taking anticoagulants     Last Friday patient woke up with headache  He was not able to see the red light and stop sign off and on  Last fasting he saw Dr. Ji his cardiologist and everything checked out good  Patient has refused to take cholesterol medication because of muscle aches  He has refused to take blood thinners because he       does not trust them  Patient is here for follow-up from hospital patient was admitted and October for partial small follow-upbowel obstruction structure treated conservatively   follow-up on hypertension COPD A. fib        Past medical history obstructive sleep apnea, appendectomy, atrophic fibrillation, hypertension, varicose vein, stasis dermatitis, left biceps tendon repair, morbid obesity     Nonsmoker social drinker     Mother had COPD father had coronary artery disease       Objective   /82   Ht 1.829 m (6')   Wt (!) 188 kg (415 lb)   BMI 56.28 kg/m²     Physical Exam  Vitals reviewed.   Constitutional:       Appearance: Normal appearance.   HENT:      Head: Normocephalic and atraumatic.      Right Ear: Tympanic membrane, ear canal and external ear normal.      Left Ear: Tympanic membrane, ear canal and external ear normal.      Nose: Nose normal.      Mouth/Throat:      Pharynx: Oropharynx is clear.   Eyes:      Extraocular Movements: Extraocular movements intact.      Conjunctiva/sclera: Conjunctivae normal.      Pupils: Pupils are equal, round, and reactive to light.   Cardiovascular:      Rate and Rhythm: Normal rate and regular rhythm.      Pulses: Normal pulses.      Heart sounds: Normal heart sounds.   Pulmonary:      Effort: Pulmonary effort is normal.       Breath sounds: Normal breath sounds. No wheezing.   Abdominal:      General: Abdomen is flat. Bowel sounds are normal.      Palpations: Abdomen is soft.   Musculoskeletal:      Cervical back: Normal range of motion and neck supple.      Right lower leg: Edema present.      Left lower leg: Edema present.   Skin:     General: Skin is warm and dry.      Findings: Erythema and rash present.      Comments: Erythema over the ecchymosis of left knee   Neurological:      General: No focal deficit present.      Mental Status: He is alert and oriented to person, place, and time.   Psychiatric:         Mood and Affect: Mood normal.         Assessment/Plan   Problem List Items Addressed This Visit           ICD-10-CM       Cardiac and Vasculature    Benign essential hypertension I10    Hyperlipidemia E78.5       Endocrine/Metabolic    Morbid obesity (Multi) E66.01       Genitourinary and Reproductive    Elevated PSA R97.20       Sleep    Obstructive sleep apnea, adult G47.33     Other Visit Diagnoses         Codes      Numbness and tingling of left leg    -  Primary R20.0, R20.2          past recap  Patient is due for follow-up of his aortic stenosis  Ordered echocardiogram  Blood work ordered  Medications ordered  Encourage patient to lose weight diet and exercise  Be compliant with BiPAP  Follow-up after blood work     9/27/22  We will x-ray the lungs  May be the leg swelling related to CHF  Patient does follow with a cardiologist  He did echo with him  Will start Lasix 20 mg a day  Advised to follow-up with the cardiologist  Blood work ordered  Blood pressure is okay  Patient is noncompliant with CPAP which can also lead to right-sided heart failure  Encouraged to lose weight  Low-salt diet  Keep legs elevated  If not better follow-up     12/12/22  Leg swelling doing little better  Patient not taking Lasix on regular basis  Did not do blood work yet  Explained that x-ray had showed some congestive heart failure needs to  take Lasix every day needs to do blood work  Medications refilled  Follow-up in 3 months/after blood work  Patient really needs to lose weight  Discussed diet and exercise      3/17/23  Blood pressure is stable at 110/70  Blood work shows bicarb slightly elevated hemoglobin 17.7 elevated  Patient probably still has sleep apnea causing these changes  He is refusing to go for CPAP  He still appears to be slightly volume overloaded  We will check chest x-ray  Lasix refilled  Blood pressure is stable cholesterol is okay  Follow-up blood work in 3 months  Again encourage patient to lose weight  Discussed weight loss options  Patient is now interested in any options at this time he is going to work with diet and exercise follow-up in 3 months     5/3/2023  Patient is in A-fib  Heart rate is in 90s at rest  EKG shows A-fib  I am wondering if patient is having CHF  He had watchman's procedure and does not take anticoagulants  Chest x-ray ordered  Take double the dose of Lasix  Advised patient to see the cardiologist  Also recommended him to go to the emergency room as he appears to be in congestive heart failure  Patient will wait for the x-ray results and contact his cardiologist     5/15/2023  Chest x-ray showed congestive heart failure BNP 66 only LDL 50  Clinically patient is doing much better breathing wise  He still has leg swelling but is sleeping recliner  Continue maintenance Lasix  Patient is staying in A-fib.  Watchman's procedure  Advised to see the cardiologist sooner  Again encourage patient to go for sleep doctor and adjust mask for CPAP but he is refusing but he states he will use the mouthpiece  We will try Flonase for the nasal congestion  Follow-up blood work in 3 months     9/25/2023  Breathing treatment given  We will do a stat chest x-ray  Zithromax for 10 days  Albuterol inhaler as needed  Rule out possibility of CHF   decide further after x-ray    9/26/2023  Chest x-ray shows congestive heart  failure  EKG shows A-fib  BMP ordered  Explained patient that it looks like congestive heart failure he should be in the hospital but he is refusing because he feels better already  Will increase Lasix 40 mg twice daily  Follow-up in a week  Advised to also follow-up with the cardiologist      12 /4/2023  Patient is tolerating diuretics  Lasix is helping to keep the swelling down in the legs  Follow-up blood work in 3 months  Blood pressure stable  Treat with Z-Luis  Bronchitis could be possibly related to acid reflux  Try Protonix 40 mg a day  Patient advised not to snack at late hours     2/26/2024  Blood pressure is borderline high  Will order blood work CBC CMP fasting with TSH BNP to assess CHF and vitamins  Medications refilled  Follow-up blood work in 3 months  Refills given  Again encourage patient to lose weight    7/8/2024  Will send urine for culture  CT of the abdomen and pelvis to rule out kidney stone  CAT scan reviewed no kidney stone  Advised patient to follow-up with urologist for cystoscopy hematuria persists    9/20/2024  Will x-ray left foot  Blood pressure stable  Blood work ordered  Patient has bilateral cerumen impaction  Use mineral oil and follow-up for cleaning  Since patient is on blood thinners needs to be careful    12/15/2024  Syringing and curettage done in the right ear with complete removal of the wax  Patient tolerated procedure well  Hearing improved  Routine blood work ordered  Follow-up after blood    5/19/2025  Keflex given for cellulitis  Will check overnight pulse oximetry to make sure patient does not need oxygen at night  Blood pressures little elevated which could be from the recent trauma  Ordered blood work  Follow-up after blood work     5/22/2025  Blood work reviewed  Hemoglobin A1c 7  Suggested semaglutide to help lose weight and improve blood sugar but patient is not interested in doing that  Increase metformin thousand twice a day  He says he is working on weight and  Abdominal pain exercise and diet  Bicarb is high.  Patient does not use BiPAP/CPAP for sleep apnea  Waiting for overnight oxygen report  Elevated PSA refer to urologist  Follow-up blood work in 3 months    6/19/2025  Numbness and tingling most likely from leg swelling and trauma to the nerve  No signs of cellulitis anymore  Blood work reviewed  Cholesterol well-controlled  A1c coming down from 6.8  Continue metformin  Bicarb is elevated  Again encourage patient to use CPAP machine which she is refusing to use  Slightly elevated BUN and creatinine  Follow-up blood work in 3 months  Patient will be following up with urologist for high PSA  Appreciated for losing weight  Follow-up blood in 3 months